# Patient Record
Sex: FEMALE | Race: WHITE | NOT HISPANIC OR LATINO | Employment: OTHER | URBAN - METROPOLITAN AREA
[De-identification: names, ages, dates, MRNs, and addresses within clinical notes are randomized per-mention and may not be internally consistent; named-entity substitution may affect disease eponyms.]

---

## 2023-01-01 ENCOUNTER — TELEPHONE (OUTPATIENT)
Dept: PRIMARY CARE | Facility: CLINIC | Age: 83
End: 2023-01-01
Payer: MEDICARE

## 2023-01-01 ENCOUNTER — OFFICE VISIT (OUTPATIENT)
Dept: PRIMARY CARE | Facility: CLINIC | Age: 83
End: 2023-01-01
Payer: MEDICARE

## 2023-01-01 VITALS
BODY MASS INDEX: 21.68 KG/M2 | HEIGHT: 64 IN | SYSTOLIC BLOOD PRESSURE: 118 MMHG | OXYGEN SATURATION: 95 % | WEIGHT: 127 LBS | DIASTOLIC BLOOD PRESSURE: 68 MMHG | HEART RATE: 95 BPM | RESPIRATION RATE: 16 BRPM

## 2023-01-01 DIAGNOSIS — E11.9 TYPE 2 DIABETES MELLITUS WITHOUT COMPLICATION, WITHOUT LONG-TERM CURRENT USE OF INSULIN (MULTI): ICD-10-CM

## 2023-01-01 DIAGNOSIS — Z13.29 SCREENING FOR HYPOTHYROIDISM: ICD-10-CM

## 2023-01-01 DIAGNOSIS — M81.0 OSTEOPOROSIS, UNSPECIFIED OSTEOPOROSIS TYPE, UNSPECIFIED PATHOLOGICAL FRACTURE PRESENCE: ICD-10-CM

## 2023-01-01 DIAGNOSIS — Z00.00 ROUTINE GENERAL MEDICAL EXAMINATION AT HEALTH CARE FACILITY: Primary | ICD-10-CM

## 2023-01-01 DIAGNOSIS — I10 PRIMARY HYPERTENSION: ICD-10-CM

## 2023-01-01 DIAGNOSIS — M81.0 AGE RELATED OSTEOPOROSIS, UNSPECIFIED PATHOLOGICAL FRACTURE PRESENCE: Primary | ICD-10-CM

## 2023-01-01 DIAGNOSIS — N28.9 RENAL INSUFFICIENCY: ICD-10-CM

## 2023-01-01 DIAGNOSIS — E53.9 VITAMIN B DEFICIENCY: ICD-10-CM

## 2023-01-01 DIAGNOSIS — E11.21 CONTROLLED TYPE 2 DIABETES MELLITUS WITH DIABETIC NEPHROPATHY, WITHOUT LONG-TERM CURRENT USE OF INSULIN (MULTI): Primary | ICD-10-CM

## 2023-01-01 DIAGNOSIS — E78.2 MIXED HYPERLIPIDEMIA: ICD-10-CM

## 2023-01-01 LAB
ALBUMIN (G/DL) IN SER/PLAS: 4.2 G/DL (ref 3.4–5)
ANION GAP IN SER/PLAS: 17 MMOL/L (ref 10–20)
CALCIDIOL (25 OH VITAMIN D3) (NG/ML) IN SER/PLAS: 69 NG/ML
CALCIUM (MG/DL) IN SER/PLAS: 9.2 MG/DL (ref 8.6–10.3)
CARBON DIOXIDE, TOTAL (MMOL/L) IN SER/PLAS: 22 MMOL/L (ref 21–32)
CHLORIDE (MMOL/L) IN SER/PLAS: 107 MMOL/L (ref 98–107)
CREATININE (MG/DL) IN SER/PLAS: 1.69 MG/DL (ref 0.5–1.05)
ERYTHROCYTE DISTRIBUTION WIDTH (RATIO) BY AUTOMATED COUNT: 12.6 % (ref 11.5–14.5)
ERYTHROCYTE MEAN CORPUSCULAR HEMOGLOBIN CONCENTRATION (G/DL) BY AUTOMATED: 30.6 G/DL (ref 32–36)
ERYTHROCYTE MEAN CORPUSCULAR VOLUME (FL) BY AUTOMATED COUNT: 99 FL (ref 80–100)
ERYTHROCYTES (10*6/UL) IN BLOOD BY AUTOMATED COUNT: 3.42 X10E12/L (ref 4–5.2)
GFR FEMALE: 30 ML/MIN/1.73M2
GLUCOSE (MG/DL) IN SER/PLAS: 93 MG/DL (ref 74–99)
HEMATOCRIT (%) IN BLOOD BY AUTOMATED COUNT: 33.7 % (ref 36–46)
HEMOGLOBIN (G/DL) IN BLOOD: 10.3 G/DL (ref 12–16)
LEUKOCYTES (10*3/UL) IN BLOOD BY AUTOMATED COUNT: 7.2 X10E9/L (ref 4.4–11.3)
PARATHYRIN INTACT (PG/ML) IN SER/PLAS: 60.5 PG/ML (ref 18.5–88)
PHOSPHATE (MG/DL) IN SER/PLAS: 4 MG/DL (ref 2.5–4.9)
PLATELETS (10*3/UL) IN BLOOD AUTOMATED COUNT: 277 X10E9/L (ref 150–450)
POTASSIUM (MMOL/L) IN SER/PLAS: 4.6 MMOL/L (ref 3.5–5.3)
SODIUM (MMOL/L) IN SER/PLAS: 141 MMOL/L (ref 136–145)
UREA NITROGEN (MG/DL) IN SER/PLAS: 35 MG/DL (ref 6–23)

## 2023-01-01 PROCEDURE — 3074F SYST BP LT 130 MM HG: CPT

## 2023-01-01 PROCEDURE — 1160F RVW MEDS BY RX/DR IN RCRD: CPT

## 2023-01-01 PROCEDURE — 3078F DIAST BP <80 MM HG: CPT

## 2023-01-01 PROCEDURE — 1170F FXNL STATUS ASSESSED: CPT

## 2023-01-01 PROCEDURE — 1159F MED LIST DOCD IN RCRD: CPT

## 2023-01-01 PROCEDURE — 99214 OFFICE O/P EST MOD 30 MIN: CPT

## 2023-01-01 PROCEDURE — G0439 PPPS, SUBSEQ VISIT: HCPCS

## 2023-01-01 PROCEDURE — 1036F TOBACCO NON-USER: CPT

## 2023-01-01 RX ORDER — METFORMIN HYDROCHLORIDE 850 MG/1
1 TABLET ORAL
COMMUNITY
End: 2023-01-01 | Stop reason: SDUPTHER

## 2023-01-01 RX ORDER — METFORMIN HYDROCHLORIDE 850 MG/1
850 TABLET ORAL
Qty: 180 TABLET | Refills: 3 | Status: SHIPPED | OUTPATIENT
Start: 2023-01-01 | End: 2024-01-01 | Stop reason: HOSPADM

## 2023-01-01 RX ORDER — SIMVASTATIN 40 MG/1
40 TABLET, FILM COATED ORAL NIGHTLY
COMMUNITY
End: 2024-01-01 | Stop reason: HOSPADM

## 2023-01-01 RX ORDER — IBANDRONATE SODIUM 150 MG/1
150 TABLET, FILM COATED ORAL
COMMUNITY
End: 2023-01-01 | Stop reason: SDUPTHER

## 2023-01-01 RX ORDER — LISINOPRIL 20 MG/1
20 TABLET ORAL DAILY
COMMUNITY
End: 2024-01-01 | Stop reason: HOSPADM

## 2023-01-01 RX ORDER — METOPROLOL SUCCINATE 25 MG/1
25 TABLET, EXTENDED RELEASE ORAL DAILY
Qty: 90 TABLET | Refills: 1 | Status: SHIPPED | OUTPATIENT
Start: 2023-01-01 | End: 2024-01-01 | Stop reason: SDUPTHER

## 2023-01-01 RX ORDER — IBANDRONATE SODIUM 150 MG/1
150 TABLET, FILM COATED ORAL
Qty: 90 TABLET | Refills: 1 | Status: SHIPPED | OUTPATIENT
Start: 2023-01-01 | End: 2024-01-01 | Stop reason: HOSPADM

## 2023-01-01 RX ORDER — METOPROLOL SUCCINATE 25 MG/1
25 TABLET, EXTENDED RELEASE ORAL DAILY
COMMUNITY
End: 2023-01-01 | Stop reason: SDUPTHER

## 2023-01-01 RX ORDER — VALSARTAN AND HYDROCHLOROTHIAZIDE 320; 25 MG/1; MG/1
1 TABLET, FILM COATED ORAL DAILY
COMMUNITY
Start: 2020-03-26 | End: 2024-01-01 | Stop reason: HOSPADM

## 2023-01-01 ASSESSMENT — ACTIVITIES OF DAILY LIVING (ADL)
MANAGING_FINANCES: INDEPENDENT
GROCERY_SHOPPING: INDEPENDENT
BATHING: INDEPENDENT
DOING_HOUSEWORK: INDEPENDENT
TAKING_MEDICATION: INDEPENDENT
DRESSING: INDEPENDENT

## 2023-01-01 ASSESSMENT — ENCOUNTER SYMPTOMS
EYES NEGATIVE: 1
MUSCULOSKELETAL NEGATIVE: 1
LOSS OF SENSATION IN FEET: 0
HEMATOLOGIC/LYMPHATIC NEGATIVE: 1
OCCASIONAL FEELINGS OF UNSTEADINESS: 0
CARDIOVASCULAR NEGATIVE: 1
ENDOCRINE NEGATIVE: 1
RESPIRATORY NEGATIVE: 1
ALLERGIC/IMMUNOLOGIC NEGATIVE: 1
PSYCHIATRIC NEGATIVE: 1
CONSTITUTIONAL NEGATIVE: 1
DEPRESSION: 0
GASTROINTESTINAL NEGATIVE: 1
NEUROLOGICAL NEGATIVE: 1

## 2023-01-01 ASSESSMENT — ANXIETY QUESTIONNAIRES
6. BECOMING EASILY ANNOYED OR IRRITABLE: NOT AT ALL
5. BEING SO RESTLESS THAT IT IS HARD TO SIT STILL: NOT AT ALL
GAD7 TOTAL SCORE: 0
1. FEELING NERVOUS, ANXIOUS, OR ON EDGE: NOT AT ALL
2. NOT BEING ABLE TO STOP OR CONTROL WORRYING: NOT AT ALL
7. FEELING AFRAID AS IF SOMETHING AWFUL MIGHT HAPPEN: NOT AT ALL
IF YOU CHECKED OFF ANY PROBLEMS ON THIS QUESTIONNAIRE, HOW DIFFICULT HAVE THESE PROBLEMS MADE IT FOR YOU TO DO YOUR WORK, TAKE CARE OF THINGS AT HOME, OR GET ALONG WITH OTHER PEOPLE: NOT DIFFICULT AT ALL
4. TROUBLE RELAXING: NOT AT ALL
3. WORRYING TOO MUCH ABOUT DIFFERENT THINGS: NOT AT ALL

## 2023-01-01 ASSESSMENT — PATIENT HEALTH QUESTIONNAIRE - PHQ9
1. LITTLE INTEREST OR PLEASURE IN DOING THINGS: NOT AT ALL
SUM OF ALL RESPONSES TO PHQ9 QUESTIONS 1 AND 2: 0
2. FEELING DOWN, DEPRESSED OR HOPELESS: NOT AT ALL

## 2023-08-10 PROBLEM — I10 HYPERTENSION: Status: ACTIVE | Noted: 2023-01-01

## 2023-08-10 PROBLEM — E53.9 VITAMIN B DEFICIENCY: Status: ACTIVE | Noted: 2023-01-01

## 2023-08-10 PROBLEM — J30.9 ALLERGIC RHINITIS: Status: ACTIVE | Noted: 2023-01-01

## 2023-08-10 PROBLEM — U07.1 COVID-19: Status: ACTIVE | Noted: 2023-01-01

## 2023-08-10 PROBLEM — N28.9 RENAL INSUFFICIENCY: Status: ACTIVE | Noted: 2023-01-01

## 2023-08-10 PROBLEM — R79.89 ABNORMAL TSH: Status: ACTIVE | Noted: 2023-01-01

## 2023-08-10 PROBLEM — E78.5 HYPERLIPEMIA: Status: ACTIVE | Noted: 2023-01-01

## 2023-08-10 PROBLEM — E11.9 DIABETES (MULTI): Status: ACTIVE | Noted: 2023-01-01

## 2023-08-10 PROBLEM — D64.9 ANEMIA: Status: ACTIVE | Noted: 2023-01-01

## 2023-08-10 PROBLEM — M81.0 OSTEOPOROSIS: Status: ACTIVE | Noted: 2023-01-01

## 2023-08-10 NOTE — PROGRESS NOTES
"Subjective   Reason for Visit: Kathy Pappas is an 83 y.o. female here for a Medicare Wellness visit.     Past Medical, Surgical, and Family History reviewed and updated in chart.    Reviewed all medications by prescribing practitioner or clinical pharmacist (such as prescriptions, OTCs, herbal therapies and supplements) and documented in the medical record.    HPI an 83-year-old female with past medical history of hypertension, type 2 diabetes mellitus, CKD currently followed by  nephrology, and hyperlipidemia arrives to the clinic with chief complaint of 6-month Medicare wellness.  The patient reports no recent illnesses and has been relatively healthy.  She does not need any medication refills at this time.  Patient denies any chest pain, shortness of breath, diarrhea, fevers, chills, head pain, COVID-like symptoms.    Patient Care Team:  KEYSHA Ross-DAILY as PCP - General  Nhan Brooke MD as PCP - [employer]vantage PCP     Review of Systems   Constitutional: Negative.    HENT: Negative.     Eyes: Negative.    Respiratory: Negative.     Cardiovascular: Negative.    Gastrointestinal: Negative.    Endocrine: Negative.    Genitourinary: Negative.    Musculoskeletal: Negative.    Skin: Negative.    Allergic/Immunologic: Negative.    Neurological: Negative.    Hematological: Negative.    Psychiatric/Behavioral: Negative.     All other systems reviewed and are negative.      Objective   Vitals:  /68   Pulse 95   Resp 16   Ht 1.613 m (5' 3.5\")   Wt 57.6 kg (127 lb)   SpO2 95%   BMI 22.14 kg/m²       Physical Exam  Vitals and nursing note reviewed.   Constitutional:       Appearance: Normal appearance.   HENT:      Head: Normocephalic and atraumatic.      Right Ear: Tympanic membrane normal.      Left Ear: Tympanic membrane normal.      Nose: Nose normal.      Mouth/Throat:      Mouth: Mucous membranes are moist.      Pharynx: Oropharynx is clear.   Eyes:      Extraocular " Movements: Extraocular movements intact.      Conjunctiva/sclera: Conjunctivae normal.      Pupils: Pupils are equal, round, and reactive to light.   Cardiovascular:      Rate and Rhythm: Normal rate and regular rhythm.   Pulmonary:      Effort: Pulmonary effort is normal.      Breath sounds: Normal breath sounds.   Abdominal:      General: Bowel sounds are normal.      Palpations: Abdomen is soft.   Musculoskeletal:         General: Normal range of motion.      Cervical back: Normal range of motion and neck supple.   Skin:     General: Skin is warm.      Capillary Refill: Capillary refill takes less than 2 seconds.   Neurological:      General: No focal deficit present.      Mental Status: She is alert and oriented to person, place, and time. Mental status is at baseline.   Psychiatric:         Mood and Affect: Mood normal.         Behavior: Behavior normal.         Thought Content: Thought content normal.         Judgment: Judgment normal.         Assessment/Plan   Problem List Items Addressed This Visit       Diabetes (CMS/[facility])    Relevant Orders    Comprehensive Metabolic Panel    CBC    Hemoglobin A1C    Follow Up In [facility] - PCP - Established    Hyperlipemia    Relevant Orders    Lipid Panel    Follow Up In [facility] - PCP - Established    Hypertension    Relevant Orders    Comprehensive Metabolic Panel    CBC    Follow Up In [facility] - PCP - Established    Osteoporosis    Relevant Orders    Vitamin D, Total    Follow Up In [facility] - PCP - Established    Vitamin B deficiency    Relevant Orders    Vitamin B12    Follow Up In [facility] - PCP - Established     Other Visit Diagnoses       Routine general medical examination at health care facility    -  Primary    Relevant Orders    Follow Up In [facility] - PCP - Established    Screening for hypothyroidism        Relevant Orders    TSH with reflex to Free T4 if abnormal    Follow Up In  [facility] - PCP - Established          It was a pleasure seeing you in the office today.    Continue the treatment plan and medication regimen set forth by  [facility].    Continue lisinopril 20 mg oral tablet daily, metoprolol 25 mg oral tablet daily, Diovan-hydrochlorothiazide 320-25 mg oral tablet daily for your blood pressure.  Your blood pressure is controlled.    Continue simvastatin 40 mg oral tablet nightly for your cholesterol.    Continue metformin 850 mg oral tablet twice a day for your diabetes.    Continue Boniva 100 mg per nephrology.    Next appointment will be in 6 months with Dr. Jazz Amin.  I have ordered lab work for you to complete prior to then.  These labs require you to fast.    I also advised you to follow low fat diet and exercise for at least 30 minutes daily.    Anticipatory guidance, age appropriate vaccines, screening exams, health promotion and prevention discussed.    Continue to monitor sugars closely, follow diabetic diet and encouraged daily activity or exercise as able.    This document was generated using the assistance of voice recognition software. If there are any errors of spelling, grammar, syntax, or meaning; please feel free to contact me directly for clarification.

## 2023-08-10 NOTE — PATIENT INSTRUCTIONS
6 month follow up with fasting labs. Will call with results.     Follow up in 6 months with Dr. Jazz Amin.

## 2023-11-03 NOTE — TELEPHONE ENCOUNTER
Rx Refill Request Telephone Encounter    Name:  Kathy JAUREGUI Elyse  :  [id number]  Medication Name:  Ibandronate  150 mg          Specific Pharmacy location:  [facility]  Rx Refill Request Telephone Encounter    Name:  Kathy JAUREGUI Elyse  :  [id number]  Medication Name:  Metoprolol succinate XL  25 mg

## 2024-01-01 ENCOUNTER — APPOINTMENT (OUTPATIENT)
Dept: CARDIOLOGY | Facility: HOSPITAL | Age: 84
DRG: 480 | End: 2024-01-01
Payer: MEDICARE

## 2024-01-01 ENCOUNTER — ANESTHESIA (OUTPATIENT)
Dept: OPERATING ROOM | Facility: HOSPITAL | Age: 84
DRG: 480 | End: 2024-01-01
Payer: MEDICARE

## 2024-01-01 ENCOUNTER — APPOINTMENT (OUTPATIENT)
Dept: RADIOLOGY | Facility: HOSPITAL | Age: 84
DRG: 480 | End: 2024-01-01
Payer: MEDICARE

## 2024-01-01 ENCOUNTER — PREP FOR PROCEDURE (OUTPATIENT)
Dept: ORTHOPEDIC SURGERY | Facility: CLINIC | Age: 84
End: 2024-01-01

## 2024-01-01 ENCOUNTER — LAB (OUTPATIENT)
Dept: LAB | Facility: LAB | Age: 84
End: 2024-01-01
Payer: MEDICARE

## 2024-01-01 ENCOUNTER — ANESTHESIA EVENT (OUTPATIENT)
Dept: OPERATING ROOM | Facility: HOSPITAL | Age: 84
DRG: 480 | End: 2024-01-01
Payer: MEDICARE

## 2024-01-01 ENCOUNTER — HOSPITAL ENCOUNTER (INPATIENT)
Dept: NEUROLOGY | Facility: HOSPITAL | Age: 84
Discharge: HOME | DRG: 480 | End: 2024-02-19
Payer: MEDICARE

## 2024-01-01 ENCOUNTER — HOSPITAL ENCOUNTER (INPATIENT)
Facility: HOSPITAL | Age: 84
LOS: 6 days | DRG: 480 | End: 2024-02-24
Attending: STUDENT IN AN ORGANIZED HEALTH CARE EDUCATION/TRAINING PROGRAM | Admitting: INTERNAL MEDICINE
Payer: MEDICARE

## 2024-01-01 ENCOUNTER — TELEPHONE (OUTPATIENT)
Dept: PRIMARY CARE | Facility: CLINIC | Age: 84
End: 2024-01-01
Payer: MEDICARE

## 2024-01-01 ENCOUNTER — APPOINTMENT (OUTPATIENT)
Dept: PRIMARY CARE | Facility: CLINIC | Age: 84
End: 2024-01-01
Payer: MEDICARE

## 2024-01-01 VITALS
HEIGHT: 63 IN | SYSTOLIC BLOOD PRESSURE: 112 MMHG | WEIGHT: 134.92 LBS | OXYGEN SATURATION: 100 % | HEART RATE: 21 BPM | TEMPERATURE: 96.1 F | DIASTOLIC BLOOD PRESSURE: 63 MMHG | BODY MASS INDEX: 23.91 KG/M2

## 2024-01-01 DIAGNOSIS — S72.001A CLOSED FRACTURE OF RIGHT HIP, INITIAL ENCOUNTER (MULTI): ICD-10-CM

## 2024-01-01 DIAGNOSIS — S72.001A: ICD-10-CM

## 2024-01-01 DIAGNOSIS — E78.2 MIXED HYPERLIPIDEMIA: ICD-10-CM

## 2024-01-01 DIAGNOSIS — N18.32 CHRONIC KIDNEY DISEASE, STAGE 3B (MULTI): Primary | ICD-10-CM

## 2024-01-01 DIAGNOSIS — R68.89 OTHER GENERAL SYMPTOMS AND SIGNS: ICD-10-CM

## 2024-01-01 DIAGNOSIS — I10 PRIMARY HYPERTENSION: ICD-10-CM

## 2024-01-01 DIAGNOSIS — E11.9 TYPE 2 DIABETES MELLITUS WITHOUT COMPLICATION, WITHOUT LONG-TERM CURRENT USE OF INSULIN (MULTI): ICD-10-CM
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
## 2024-01-01 DIAGNOSIS — Z13.29 SCREENING FOR HYPOTHYROIDISM: ICD-10-CM

## 2024-01-01 DIAGNOSIS — E53.9 VITAMIN B DEFICIENCY: ICD-10-CM

## 2024-01-01 DIAGNOSIS — I63.9 CEREBROVASCULAR ACCIDENT (CVA), UNSPECIFIED MECHANISM (MULTI): ICD-10-CM

## 2024-01-01 DIAGNOSIS — I46.9 CARDIOPULMONARY ARREST (MULTI): ICD-10-CM

## 2024-01-01 DIAGNOSIS — E83.42 HYPOMAGNESEMIA: ICD-10-CM

## 2024-01-01 DIAGNOSIS — M81.0 OSTEOPOROSIS, UNSPECIFIED OSTEOPOROSIS TYPE, UNSPECIFIED PATHOLOGICAL FRACTURE PRESENCE: ICD-10-CM

## 2024-01-01 LAB
25(OH)D3 SERPL-MCNC: 65 NG/ML (ref 30–100)
ABO GROUP (TYPE) IN BLOOD: NORMAL
ABO GROUP (TYPE) IN BLOOD: NORMAL
ALBUMIN SERPL BCP-MCNC: 2.8 G/DL (ref 3.4–5)
ALBUMIN SERPL BCP-MCNC: 2.8 G/DL (ref 3.4–5)
ALBUMIN SERPL BCP-MCNC: 2.9 G/DL (ref 3.4–5)
ALBUMIN SERPL BCP-MCNC: 3 G/DL (ref 3.4–5)
ALBUMIN SERPL BCP-MCNC: 3.1 G/DL (ref 3.4–5)
ALBUMIN SERPL BCP-MCNC: 3.2 G/DL (ref 3.4–5)
ALBUMIN SERPL BCP-MCNC: 3.9 G/DL (ref 3.4–5)
ALBUMIN SERPL BCP-MCNC: 4 G/DL (ref 3.4–5)
ALP SERPL-CCNC: 50 U/L (ref 33–136)
ALP SERPL-CCNC: 52 U/L (ref 33–136)
ALP SERPL-CCNC: 55 U/L (ref 33–136)
ALP SERPL-CCNC: 64 U/L (ref 33–136)
ALP SERPL-CCNC: 65 U/L (ref 33–136)
ALP SERPL-CCNC: 75 U/L (ref 33–136)
ALP SERPL-CCNC: 80 U/L (ref 33–136)
ALP SERPL-CCNC: 95 U/L (ref 33–136)
ALT SERPL W P-5'-P-CCNC: 10 U/L (ref 7–45)
ALT SERPL W P-5'-P-CCNC: 123 U/L (ref 7–45)
ALT SERPL W P-5'-P-CCNC: 15 U/L (ref 7–45)
ALT SERPL W P-5'-P-CCNC: 3 U/L (ref 7–45)
ALT SERPL W P-5'-P-CCNC: 3 U/L (ref 7–45)
ALT SERPL W P-5'-P-CCNC: 40 U/L (ref 7–45)
ALT SERPL W P-5'-P-CCNC: 5 U/L (ref 7–45)
ALT SERPL W P-5'-P-CCNC: 7 U/L (ref 7–45)
ANION GAP BLDA CALCULATED.4IONS-SCNC: 11 MMO/L (ref 10–25)
ANION GAP BLDV CALCULATED.4IONS-SCNC: 6 MMOL/L (ref 10–25)
ANION GAP SERPL CALC-SCNC: 10 MMOL/L (ref 10–20)
ANION GAP SERPL CALC-SCNC: 11 MMOL/L (ref 10–20)
ANION GAP SERPL CALC-SCNC: 12 MMOL/L (ref 10–20)
ANION GAP SERPL CALC-SCNC: 13 MMOL/L (ref 10–20)
ANION GAP SERPL CALC-SCNC: 14 MMOL/L (ref 10–20)
ANION GAP SERPL CALC-SCNC: 15 MMOL/L (ref 10–20)
ANION GAP SERPL CALC-SCNC: 15 MMOL/L (ref 10–20)
ANION GAP SERPL CALC-SCNC: 18 MMOL/L (ref 10–20)
ANION GAP SERPL CALC-SCNC: 21 MMOL/L (ref 10–20)
ANTIBODY SCREEN: NORMAL
ANTIBODY SCREEN: NORMAL
AORTIC VALVE MEAN GRADIENT: 6 MMHG
AORTIC VALVE PEAK VELOCITY: 1.54 M/S
APPEARANCE UR: CLEAR
AST SERPL W P-5'-P-CCNC: 101 U/L (ref 9–39)
AST SERPL W P-5'-P-CCNC: 12 U/L (ref 9–39)
AST SERPL W P-5'-P-CCNC: 12 U/L (ref 9–39)
AST SERPL W P-5'-P-CCNC: 141 U/L (ref 9–39)
AST SERPL W P-5'-P-CCNC: 215 U/L (ref 9–39)
AST SERPL W P-5'-P-CCNC: 32 U/L (ref 9–39)
AST SERPL W P-5'-P-CCNC: 37 U/L (ref 9–39)
AST SERPL W P-5'-P-CCNC: 49 U/L (ref 9–39)
ATRIAL RATE: 74 BPM
ATRIAL RATE: 74 BPM
AV PEAK GRADIENT: 9.5 MMHG
AVA (PEAK VEL): 1.78 CM2
AVA (VTI): 1.99 CM2
BASE EXCESS BLDA CALC-SCNC: -4.8 MMOL/L (ref -2–3)
BASE EXCESS BLDV CALC-SCNC: -2.2 MMOL/L (ref -2–3)
BASE EXCESS BLDV CALC-SCNC: 3 MMOL/L (ref -2–3)
BASOPHILS # BLD AUTO: 0.04 X10*3/UL (ref 0–0.1)
BASOPHILS NFR BLD AUTO: 0.4 %
BILIRUB DIRECT SERPL-MCNC: 0.1 MG/DL (ref 0–0.3)
BILIRUB DIRECT SERPL-MCNC: 0.2 MG/DL (ref 0–0.3)
BILIRUB SERPL-MCNC: 0.5 MG/DL (ref 0–1.2)
BILIRUB SERPL-MCNC: 0.5 MG/DL (ref 0–1.2)
BILIRUB SERPL-MCNC: 0.6 MG/DL (ref 0–1.2)
BILIRUB SERPL-MCNC: 0.7 MG/DL (ref 0–1.2)
BILIRUB UR STRIP.AUTO-MCNC: NEGATIVE MG/DL
BLOOD EXPIRATION DATE: NORMAL
BLOOD EXPIRATION DATE: NORMAL
BODY TEMPERATURE: 37 DEGREES CELSIUS
BUN SERPL-MCNC: 27 MG/DL (ref 6–23)
BUN SERPL-MCNC: 29 MG/DL (ref 6–23)
BUN SERPL-MCNC: 31 MG/DL (ref 6–23)
BUN SERPL-MCNC: 32 MG/DL (ref 6–23)
BUN SERPL-MCNC: 33 MG/DL (ref 6–23)
BUN SERPL-MCNC: 34 MG/DL (ref 6–23)
BUN SERPL-MCNC: 34 MG/DL (ref 6–23)
BUN SERPL-MCNC: 41 MG/DL (ref 6–23)
BUN SERPL-MCNC: 45 MG/DL (ref 6–23)
CA-I BLDA-SCNC: 0.99 MMOL/L (ref 1.1–1.33)
CA-I BLDV-SCNC: 1.04 MMOL/L (ref 1.1–1.33)
CALCIUM SERPL-MCNC: 6.9 MG/DL (ref 8.6–10.3)
CALCIUM SERPL-MCNC: 7 MG/DL (ref 8.6–10.3)
CALCIUM SERPL-MCNC: 7.2 MG/DL (ref 8.6–10.3)
CALCIUM SERPL-MCNC: 7.6 MG/DL (ref 8.6–10.3)
CALCIUM SERPL-MCNC: 8.2 MG/DL (ref 8.6–10.3)
CALCIUM SERPL-MCNC: 8.4 MG/DL (ref 8.6–10.3)
CALCIUM SERPL-MCNC: 8.6 MG/DL (ref 8.6–10.3)
CALCIUM SERPL-MCNC: 8.9 MG/DL (ref 8.6–10.3)
CALCIUM SERPL-MCNC: 9 MG/DL (ref 8.6–10.3)
CARDIAC TROPONIN I PNL SERPL HS: 1077 NG/L (ref 0–13)
CARDIAC TROPONIN I PNL SERPL HS: 26 NG/L (ref 0–13)
CHLORIDE BLDA-SCNC: 106 MMOL/L (ref 98–107)
CHLORIDE BLDV-SCNC: 106 MMOL/L (ref 98–107)
CHLORIDE SERPL-SCNC: 100 MMOL/L (ref 98–107)
CHLORIDE SERPL-SCNC: 102 MMOL/L (ref 98–107)
CHLORIDE SERPL-SCNC: 102 MMOL/L (ref 98–107)
CHLORIDE SERPL-SCNC: 104 MMOL/L (ref 98–107)
CHLORIDE SERPL-SCNC: 105 MMOL/L (ref 98–107)
CHLORIDE SERPL-SCNC: 106 MMOL/L (ref 98–107)
CHLORIDE SERPL-SCNC: 107 MMOL/L (ref 98–107)
CHLORIDE SERPL-SCNC: 107 MMOL/L (ref 98–107)
CHLORIDE SERPL-SCNC: 108 MMOL/L (ref 98–107)
CHOLEST SERPL-MCNC: 111 MG/DL (ref 0–199)
CHOLESTEROL/HDL RATIO: 2.9
CO2 SERPL-SCNC: 17 MMOL/L (ref 21–32)
CO2 SERPL-SCNC: 22 MMOL/L (ref 21–32)
CO2 SERPL-SCNC: 23 MMOL/L (ref 21–32)
CO2 SERPL-SCNC: 24 MMOL/L (ref 21–32)
CO2 SERPL-SCNC: 25 MMOL/L (ref 21–32)
CO2 SERPL-SCNC: 26 MMOL/L (ref 21–32)
CO2 SERPL-SCNC: 27 MMOL/L (ref 21–32)
COLOR UR: YELLOW
CREAT SERPL-MCNC: 1.63 MG/DL (ref 0.5–1.05)
CREAT SERPL-MCNC: 1.76 MG/DL (ref 0.5–1.05)
CREAT SERPL-MCNC: 1.76 MG/DL (ref 0.5–1.05)
CREAT SERPL-MCNC: 1.85 MG/DL (ref 0.5–1.05)
CREAT SERPL-MCNC: 1.97 MG/DL (ref 0.5–1.05)
CREAT SERPL-MCNC: 2.05 MG/DL (ref 0.5–1.05)
CREAT SERPL-MCNC: 2.09 MG/DL (ref 0.5–1.05)
CREAT SERPL-MCNC: 2.16 MG/DL (ref 0.5–1.05)
CREAT SERPL-MCNC: 2.21 MG/DL (ref 0.5–1.05)
CREAT UR-MCNC: 48.8 MG/DL (ref 20–320)
CREAT UR-MCNC: 49.2 MG/DL (ref 20–320)
DISPENSE STATUS: NORMAL
DISPENSE STATUS: NORMAL
EGFRCR SERPLBLD CKD-EPI 2021: 22 ML/MIN/1.73M*2
EGFRCR SERPLBLD CKD-EPI 2021: 22 ML/MIN/1.73M*2
EGFRCR SERPLBLD CKD-EPI 2021: 23 ML/MIN/1.73M*2
EGFRCR SERPLBLD CKD-EPI 2021: 24 ML/MIN/1.73M*2
EGFRCR SERPLBLD CKD-EPI 2021: 25 ML/MIN/1.73M*2
EGFRCR SERPLBLD CKD-EPI 2021: 27 ML/MIN/1.73M*2
EGFRCR SERPLBLD CKD-EPI 2021: 29 ML/MIN/1.73M*2
EGFRCR SERPLBLD CKD-EPI 2021: 29 ML/MIN/1.73M*2
EGFRCR SERPLBLD CKD-EPI 2021: 31 ML/MIN/1.73M*2
EJECTION FRACTION APICAL 4 CHAMBER: 81.2
EJECTION FRACTION: 81 %
EOSINOPHIL # BLD AUTO: 0.21 X10*3/UL (ref 0–0.4)
EOSINOPHIL NFR BLD AUTO: 1.9 %
ERYTHROCYTE [DISTWIDTH] IN BLOOD BY AUTOMATED COUNT: 12.5 % (ref 11.5–14.5)
ERYTHROCYTE [DISTWIDTH] IN BLOOD BY AUTOMATED COUNT: 12.6 % (ref 11.5–14.5)
ERYTHROCYTE [DISTWIDTH] IN BLOOD BY AUTOMATED COUNT: 12.6 % (ref 11.5–14.5)
ERYTHROCYTE [DISTWIDTH] IN BLOOD BY AUTOMATED COUNT: 12.8 % (ref 11.5–14.5)
ERYTHROCYTE [DISTWIDTH] IN BLOOD BY AUTOMATED COUNT: 13.6 % (ref 11.5–14.5)
ERYTHROCYTE [DISTWIDTH] IN BLOOD BY AUTOMATED COUNT: 13.8 % (ref 11.5–14.5)
ERYTHROCYTE [DISTWIDTH] IN BLOOD BY AUTOMATED COUNT: 14 % (ref 11.5–14.5)
ERYTHROCYTE [DISTWIDTH] IN BLOOD BY AUTOMATED COUNT: 14.4 % (ref 11.5–14.5)
ERYTHROCYTE [DISTWIDTH] IN BLOOD BY AUTOMATED COUNT: 14.6 % (ref 11.5–14.5)
EST. AVERAGE GLUCOSE BLD GHB EST-MCNC: 131 MG/DL
FOLATE SERPL-MCNC: 8 NG/ML
GLUCOSE BLD MANUAL STRIP-MCNC: 103 MG/DL (ref 74–99)
GLUCOSE BLD MANUAL STRIP-MCNC: 107 MG/DL (ref 74–99)
GLUCOSE BLD MANUAL STRIP-MCNC: 108 MG/DL (ref 74–99)
GLUCOSE BLD MANUAL STRIP-MCNC: 119 MG/DL (ref 74–99)
GLUCOSE BLD MANUAL STRIP-MCNC: 121 MG/DL (ref 74–99)
GLUCOSE BLD MANUAL STRIP-MCNC: 129 MG/DL (ref 74–99)
GLUCOSE BLD MANUAL STRIP-MCNC: 132 MG/DL (ref 74–99)
GLUCOSE BLD MANUAL STRIP-MCNC: 132 MG/DL (ref 74–99)
GLUCOSE BLD MANUAL STRIP-MCNC: 143 MG/DL (ref 74–99)
GLUCOSE BLD MANUAL STRIP-MCNC: 151 MG/DL (ref 74–99)
GLUCOSE BLD MANUAL STRIP-MCNC: 153 MG/DL (ref 74–99)
GLUCOSE BLD MANUAL STRIP-MCNC: 155 MG/DL (ref 74–99)
GLUCOSE BLD MANUAL STRIP-MCNC: 158 MG/DL (ref 74–99)
GLUCOSE BLD MANUAL STRIP-MCNC: 159 MG/DL (ref 74–99)
GLUCOSE BLD MANUAL STRIP-MCNC: 161 MG/DL (ref 74–99)
GLUCOSE BLD MANUAL STRIP-MCNC: 163 MG/DL (ref 74–99)
GLUCOSE BLD MANUAL STRIP-MCNC: 169 MG/DL (ref 74–99)
GLUCOSE BLD MANUAL STRIP-MCNC: 183 MG/DL (ref 74–99)
GLUCOSE BLD MANUAL STRIP-MCNC: 194 MG/DL (ref 74–99)
GLUCOSE BLD MANUAL STRIP-MCNC: 210 MG/DL (ref 74–99)
GLUCOSE BLD MANUAL STRIP-MCNC: 234 MG/DL (ref 74–99)
GLUCOSE BLD MANUAL STRIP-MCNC: 257 MG/DL (ref 74–99)
GLUCOSE BLD MANUAL STRIP-MCNC: 96 MG/DL (ref 74–99)
GLUCOSE BLDA-MCNC: 243 MG/DL (ref 74–99)
GLUCOSE BLDV-MCNC: 177 MG/DL (ref 74–99)
GLUCOSE SERPL-MCNC: 107 MG/DL (ref 74–99)
GLUCOSE SERPL-MCNC: 116 MG/DL (ref 74–99)
GLUCOSE SERPL-MCNC: 126 MG/DL (ref 74–99)
GLUCOSE SERPL-MCNC: 181 MG/DL (ref 74–99)
GLUCOSE SERPL-MCNC: 197 MG/DL (ref 74–99)
GLUCOSE SERPL-MCNC: 221 MG/DL (ref 74–99)
GLUCOSE SERPL-MCNC: 253 MG/DL (ref 74–99)
GLUCOSE SERPL-MCNC: 93 MG/DL (ref 74–99)
GLUCOSE SERPL-MCNC: 98 MG/DL (ref 74–99)
GLUCOSE UR STRIP.AUTO-MCNC: NEGATIVE MG/DL
HBA1C MFR BLD: 6.2 %
HCO3 BLDA-SCNC: 21.6 MMOL/L (ref 22–26)
HCO3 BLDV-SCNC: 27 MMOL/L (ref 22–26)
HCO3 BLDV-SCNC: 29.5 MMOL/L (ref 22–26)
HCT VFR BLD AUTO: 22.6 % (ref 36–46)
HCT VFR BLD AUTO: 22.8 % (ref 36–46)
HCT VFR BLD AUTO: 23.2 % (ref 36–46)
HCT VFR BLD AUTO: 23.5 % (ref 36–46)
HCT VFR BLD AUTO: 23.7 % (ref 36–46)
HCT VFR BLD AUTO: 28.7 % (ref 36–46)
HCT VFR BLD AUTO: 29.4 % (ref 36–46)
HCT VFR BLD AUTO: 29.6 % (ref 36–46)
HCT VFR BLD AUTO: 34.4 % (ref 36–46)
HCT VFR BLD EST: 20 % (ref 36–46)
HCT VFR BLD EST: 24 % (ref 36–46)
HDLC SERPL-MCNC: 38.2 MG/DL
HGB BLD-MCNC: 10.6 G/DL (ref 12–16)
HGB BLD-MCNC: 7.1 G/DL (ref 12–16)
HGB BLD-MCNC: 7.2 G/DL (ref 12–16)
HGB BLD-MCNC: 7.3 G/DL (ref 12–16)
HGB BLD-MCNC: 7.4 G/DL (ref 12–16)
HGB BLD-MCNC: 7.7 G/DL (ref 12–16)
HGB BLD-MCNC: 8.8 G/DL (ref 12–16)
HGB BLD-MCNC: 9.1 G/DL (ref 12–16)
HGB BLD-MCNC: 9.6 G/DL (ref 12–16)
HGB BLDA-MCNC: 6.6 G/DL (ref 12–16)
HGB BLDV-MCNC: 7.9 G/DL (ref 12–16)
HYALINE CASTS #/AREA URNS AUTO: ABNORMAL /LPF
IMM GRANULOCYTES # BLD AUTO: 0.05 X10*3/UL (ref 0–0.5)
IMM GRANULOCYTES NFR BLD AUTO: 0.5 % (ref 0–0.9)
INHALED O2 CONCENTRATION: 100 %
INHALED O2 CONCENTRATION: 45 %
INHALED O2 CONCENTRATION: 70 %
INR PPP: 1.1 (ref 0.9–1.1)
IRON SATN MFR SERPL: 16 % (ref 25–45)
IRON SERPL-MCNC: 26 UG/DL (ref 35–150)
KETONES UR STRIP.AUTO-MCNC: NEGATIVE MG/DL
LACTATE BLDA-SCNC: 5.5 MMOL/L (ref 0.4–2)
LACTATE BLDV-SCNC: 1.8 MMOL/L (ref 0.4–2)
LACTATE BLDV-SCNC: 1.8 MMOL/L (ref 0.4–2)
LACTATE SERPL-SCNC: 3.3 MMOL/L (ref 0.4–2)
LDLC SERPL CALC-MCNC: 41 MG/DL
LEFT ATRIUM VOLUME AREA LENGTH INDEX BSA: 15.1 ML/M2
LEFT VENTRICLE INTERNAL DIMENSION DIASTOLE: 3.54 CM (ref 3.5–6)
LEFT VENTRICULAR OUTFLOW TRACT DIAMETER: 2 CM
LEUKOCYTE ESTERASE UR QL STRIP.AUTO: NEGATIVE
LIPASE SERPL-CCNC: 34 U/L (ref 9–82)
LYMPHOCYTES # BLD AUTO: 1.28 X10*3/UL (ref 0.8–3)
LYMPHOCYTES NFR BLD AUTO: 11.8 %
MAGNESIUM SERPL-MCNC: 0.64 MG/DL (ref 1.6–2.4)
MAGNESIUM SERPL-MCNC: 1.55 MG/DL (ref 1.6–2.4)
MAGNESIUM SERPL-MCNC: 1.7 MG/DL (ref 1.6–2.4)
MAGNESIUM SERPL-MCNC: 1.84 MG/DL (ref 1.6–2.4)
MAGNESIUM SERPL-MCNC: 2 MG/DL (ref 1.6–2.4)
MAGNESIUM SERPL-MCNC: 2.01 MG/DL (ref 1.6–2.4)
MAGNESIUM SERPL-MCNC: 2.02 MG/DL (ref 1.6–2.4)
MCH RBC QN AUTO: 29.8 PG (ref 26–34)
MCH RBC QN AUTO: 30 PG (ref 26–34)
MCH RBC QN AUTO: 30.4 PG (ref 26–34)
MCH RBC QN AUTO: 30.7 PG (ref 26–34)
MCH RBC QN AUTO: 30.7 PG (ref 26–34)
MCH RBC QN AUTO: 31 PG (ref 26–34)
MCH RBC QN AUTO: 31.2 PG (ref 26–34)
MCHC RBC AUTO-ENTMCNC: 30.7 G/DL (ref 32–36)
MCHC RBC AUTO-ENTMCNC: 30.8 G/DL (ref 32–36)
MCHC RBC AUTO-ENTMCNC: 31 G/DL (ref 32–36)
MCHC RBC AUTO-ENTMCNC: 31.4 G/DL (ref 32–36)
MCHC RBC AUTO-ENTMCNC: 31.5 G/DL (ref 32–36)
MCHC RBC AUTO-ENTMCNC: 31.5 G/DL (ref 32–36)
MCHC RBC AUTO-ENTMCNC: 31.6 G/DL (ref 32–36)
MCHC RBC AUTO-ENTMCNC: 32.4 G/DL (ref 32–36)
MCHC RBC AUTO-ENTMCNC: 32.5 G/DL (ref 32–36)
MCV RBC AUTO: 100 FL (ref 80–100)
MCV RBC AUTO: 94 FL (ref 80–100)
MCV RBC AUTO: 95 FL (ref 80–100)
MCV RBC AUTO: 95 FL (ref 80–100)
MCV RBC AUTO: 96 FL (ref 80–100)
MCV RBC AUTO: 99 FL (ref 80–100)
MCV RBC AUTO: 99 FL (ref 80–100)
MICROALBUMIN UR-MCNC: 42 MG/L
MICROALBUMIN/CREAT UR: 85.4 UG/MG CREAT
MITRAL VALVE E/A RATIO: 0.82
MITRAL VALVE E/E' RATIO: 13.95
MONOCYTES # BLD AUTO: 0.49 X10*3/UL (ref 0.05–0.8)
MONOCYTES NFR BLD AUTO: 4.5 %
MUCOUS THREADS #/AREA URNS AUTO: ABNORMAL /LPF
NEUTROPHILS # BLD AUTO: 8.8 X10*3/UL (ref 1.6–5.5)
NEUTROPHILS NFR BLD AUTO: 80.9 %
NITRITE UR QL STRIP.AUTO: NEGATIVE
NON HDL CHOLESTEROL: 73 MG/DL (ref 0–149)
NRBC BLD-RTO: 0 /100 WBCS (ref 0–0)
NRBC BLD-RTO: 0.1 /100 WBCS (ref 0–0)
OXYHGB MFR BLDA: 33.6 % (ref 94–98)
OXYHGB MFR BLDV: 32.3 % (ref 45–75)
OXYHGB MFR BLDV: 49.4 % (ref 45–75)
P AXIS: 46 DEGREES
P AXIS: 70 DEGREES
PCO2 BLDA: 46 MM HG (ref 38–42)
PCO2 BLDV: 56 MM HG (ref 41–51)
PCO2 BLDV: 66 MM HG (ref 41–51)
PH BLDA: 7.28 PH (ref 7.38–7.42)
PH BLDV: 7.22 PH (ref 7.33–7.43)
PH BLDV: 7.33 PH (ref 7.33–7.43)
PH UR STRIP.AUTO: 6 [PH]
PHOSPHATE SERPL-MCNC: 4.5 MG/DL (ref 2.5–4.9)
PLATELET # BLD AUTO: 163 X10*3/UL (ref 150–450)
PLATELET # BLD AUTO: 178 X10*3/UL (ref 150–450)
PLATELET # BLD AUTO: 194 X10*3/UL (ref 150–450)
PLATELET # BLD AUTO: 210 X10*3/UL (ref 150–450)
PLATELET # BLD AUTO: 212 X10*3/UL (ref 150–450)
PLATELET # BLD AUTO: 234 X10*3/UL (ref 150–450)
PLATELET # BLD AUTO: 236 X10*3/UL (ref 150–450)
PLATELET # BLD AUTO: 273 X10*3/UL (ref 150–450)
PLATELET # BLD AUTO: 310 X10*3/UL (ref 150–450)
PO2 BLDA: 29 MM HG (ref 85–95)
PO2 BLDV: 33 MM HG (ref 35–45)
PO2 BLDV: 35 MM HG (ref 35–45)
POTASSIUM BLDA-SCNC: 4 MMOL/L (ref 3.5–5.3)
POTASSIUM BLDV-SCNC: 4 MMOL/L (ref 3.5–5.3)
POTASSIUM SERPL-SCNC: 3.3 MMOL/L (ref 3.5–5.3)
POTASSIUM SERPL-SCNC: 3.5 MMOL/L (ref 3.5–5.3)
POTASSIUM SERPL-SCNC: 3.7 MMOL/L (ref 3.5–5.3)
POTASSIUM SERPL-SCNC: 3.7 MMOL/L (ref 3.5–5.3)
POTASSIUM SERPL-SCNC: 4 MMOL/L (ref 3.5–5.3)
POTASSIUM SERPL-SCNC: 4.1 MMOL/L (ref 3.5–5.3)
POTASSIUM SERPL-SCNC: 4.6 MMOL/L (ref 3.5–5.3)
POTASSIUM SERPL-SCNC: 4.8 MMOL/L (ref 3.5–5.3)
POTASSIUM SERPL-SCNC: 5.2 MMOL/L (ref 3.5–5.3)
PR INTERVAL: 170 MS
PR INTERVAL: 191 MS
PRODUCT BLOOD TYPE: 5100
PRODUCT BLOOD TYPE: 5100
PRODUCT CODE: NORMAL
PRODUCT CODE: NORMAL
PROT SERPL-MCNC: 5.2 G/DL (ref 6.4–8.2)
PROT SERPL-MCNC: 5.4 G/DL (ref 6.4–8.2)
PROT SERPL-MCNC: 5.6 G/DL (ref 6.4–8.2)
PROT SERPL-MCNC: 6.3 G/DL (ref 6.4–8.2)
PROT SERPL-MCNC: 6.6 G/DL (ref 6.4–8.2)
PROT SERPL-MCNC: 6.9 G/DL (ref 6.4–8.2)
PROT UR STRIP.AUTO-MCNC: ABNORMAL MG/DL
PROTHROMBIN TIME: 12.6 SECONDS (ref 9.8–12.8)
PTH-INTACT SERPL-MCNC: 184.5 PG/ML (ref 18.5–88)
PTH-INTACT SERPL-MCNC: 313.1 PG/ML (ref 18.5–88)
Q ONSET: 251 MS
Q ONSET: 253 MS
QRS COUNT: 12 BEATS
QRS COUNT: 12 BEATS
QRS DURATION: 90 MS
QRS DURATION: 93 MS
QT INTERVAL: 406 MS
QT INTERVAL: 415 MS
QTC CALCULATION(BAZETT): 451 MS
QTC CALCULATION(BAZETT): 461 MS
QTC FREDERICIA: 435 MS
QTC FREDERICIA: 445 MS
R AXIS: 12 DEGREES
R AXIS: 57 DEGREES
RBC # BLD AUTO: 2.29 X10*6/UL (ref 4–5.2)
RBC # BLD AUTO: 2.4 X10*6/UL (ref 4–5.2)
RBC # BLD AUTO: 2.43 X10*6/UL (ref 4–5.2)
RBC # BLD AUTO: 2.47 X10*6/UL (ref 4–5.2)
RBC # BLD AUTO: 2.51 X10*6/UL (ref 4–5.2)
RBC # BLD AUTO: 2.89 X10*6/UL (ref 4–5.2)
RBC # BLD AUTO: 3.05 X10*6/UL (ref 4–5.2)
RBC # BLD AUTO: 3.08 X10*6/UL (ref 4–5.2)
RBC # BLD AUTO: 3.45 X10*6/UL (ref 4–5.2)
RBC # UR STRIP.AUTO: ABNORMAL /UL
RBC #/AREA URNS AUTO: ABNORMAL /HPF
RH FACTOR (ANTIGEN D): NORMAL
RH FACTOR (ANTIGEN D): NORMAL
RIGHT VENTRICLE FREE WALL PEAK S': 19.9 CM/S
RIGHT VENTRICLE PEAK SYSTOLIC PRESSURE: 59.6 MMHG
SAO2 % BLDA: 34 % (ref 94–100)
SAO2 % BLDV: 33 % (ref 45–75)
SAO2 % BLDV: 50 % (ref 45–75)
SODIUM BLDA-SCNC: 135 MMOL/L (ref 136–145)
SODIUM BLDV-SCNC: 137 MMOL/L (ref 136–145)
SODIUM SERPL-SCNC: 137 MMOL/L (ref 136–145)
SODIUM SERPL-SCNC: 137 MMOL/L (ref 136–145)
SODIUM SERPL-SCNC: 138 MMOL/L (ref 136–145)
SODIUM SERPL-SCNC: 139 MMOL/L (ref 136–145)
SODIUM SERPL-SCNC: 139 MMOL/L (ref 136–145)
SODIUM SERPL-SCNC: 140 MMOL/L (ref 136–145)
SODIUM SERPL-SCNC: 142 MMOL/L (ref 136–145)
SODIUM UR-SCNC: 81 MMOL/L
SODIUM/CREAT UR-RTO: 166 MMOL/G CREAT
SP GR UR STRIP.AUTO: 1.01
SQUAMOUS #/AREA URNS AUTO: ABNORMAL /HPF
T AXIS: 50 DEGREES
T AXIS: 9 DEGREES
T OFFSET: 456 MS
T OFFSET: 458 MS
T4 FREE SERPL-MCNC: 0.73 NG/DL (ref 0.61–1.12)
TIBC SERPL-MCNC: 160 UG/DL (ref 240–445)
TRICUSPID ANNULAR PLANE SYSTOLIC EXCURSION: 2.2 CM
TRIGL SERPL-MCNC: 157 MG/DL (ref 0–149)
TSH SERPL-ACNC: 5.47 MIU/L (ref 0.44–3.98)
UIBC SERPL-MCNC: 134 UG/DL (ref 110–370)
UNIT ABO: NORMAL
UNIT ABO: NORMAL
UNIT NUMBER: NORMAL
UNIT NUMBER: NORMAL
UNIT RH: NORMAL
UNIT RH: NORMAL
UNIT VOLUME: 350
UNIT VOLUME: 350
UROBILINOGEN UR STRIP.AUTO-MCNC: <2 MG/DL
VENTRICULAR RATE: 74 BPM
VENTRICULAR RATE: 74 BPM
VIT B12 SERPL-MCNC: 899 PG/ML (ref 211–911)
VLDL: 31 MG/DL (ref 0–40)
WBC # BLD AUTO: 10.9 X10*3/UL (ref 4.4–11.3)
WBC # BLD AUTO: 13.9 X10*3/UL (ref 4.4–11.3)
WBC # BLD AUTO: 7.1 X10*3/UL (ref 4.4–11.3)
WBC # BLD AUTO: 7.7 X10*3/UL (ref 4.4–11.3)
WBC # BLD AUTO: 7.7 X10*3/UL (ref 4.4–11.3)
WBC # BLD AUTO: 8.2 X10*3/UL (ref 4.4–11.3)
WBC # BLD AUTO: 8.5 X10*3/UL (ref 4.4–11.3)
WBC # BLD AUTO: 8.5 X10*3/UL (ref 4.4–11.3)
WBC # BLD AUTO: 9.1 X10*3/UL (ref 4.4–11.3)
WBC #/AREA URNS AUTO: ABNORMAL /HPF
XM INTEP: NORMAL
XM INTEP: NORMAL

## 2024-01-01 PROCEDURE — 82947 ASSAY GLUCOSE BLOOD QUANT: CPT

## 2024-01-01 PROCEDURE — 99233 SBSQ HOSP IP/OBS HIGH 50: CPT | Performed by: NURSE PRACTITIONER

## 2024-01-01 PROCEDURE — 72125 CT NECK SPINE W/O DYE: CPT

## 2024-01-01 PROCEDURE — 2500000001 HC RX 250 WO HCPCS SELF ADMINISTERED DRUGS (ALT 637 FOR MEDICARE OP): Performed by: NURSE PRACTITIONER

## 2024-01-01 PROCEDURE — 97530 THERAPEUTIC ACTIVITIES: CPT | Mod: GO,CO

## 2024-01-01 PROCEDURE — 2500000001 HC RX 250 WO HCPCS SELF ADMINISTERED DRUGS (ALT 637 FOR MEDICARE OP): Performed by: INTERNAL MEDICINE

## 2024-01-01 PROCEDURE — P9016 RBC LEUKOCYTES REDUCED: HCPCS

## 2024-01-01 PROCEDURE — 73502 X-RAY EXAM HIP UNI 2-3 VIEWS: CPT | Mod: RIGHT SIDE | Performed by: RADIOLOGY

## 2024-01-01 PROCEDURE — 70551 MRI BRAIN STEM W/O DYE: CPT | Performed by: RADIOLOGY

## 2024-01-01 PROCEDURE — 97530 THERAPEUTIC ACTIVITIES: CPT | Mod: GP,CQ

## 2024-01-01 PROCEDURE — 2500000004 HC RX 250 GENERAL PHARMACY W/ HCPCS (ALT 636 FOR OP/ED): Performed by: INTERNAL MEDICINE

## 2024-01-01 PROCEDURE — 2060000001 HC INTERMEDIATE ICU ROOM DAILY

## 2024-01-01 PROCEDURE — 99233 SBSQ HOSP IP/OBS HIGH 50: CPT | Performed by: INTERNAL MEDICINE

## 2024-01-01 PROCEDURE — 71045 X-RAY EXAM CHEST 1 VIEW: CPT | Performed by: STUDENT IN AN ORGANIZED HEALTH CARE EDUCATION/TRAINING PROGRAM

## 2024-01-01 PROCEDURE — 36415 COLL VENOUS BLD VENIPUNCTURE: CPT | Performed by: INTERNAL MEDICINE

## 2024-01-01 PROCEDURE — 84484 ASSAY OF TROPONIN QUANT: CPT | Performed by: INTERNAL MEDICINE

## 2024-01-01 PROCEDURE — 85027 COMPLETE CBC AUTOMATED: CPT | Performed by: INTERNAL MEDICINE

## 2024-01-01 PROCEDURE — 7100000002 HC RECOVERY ROOM TIME - EACH INCREMENTAL 1 MINUTE: Performed by: STUDENT IN AN ORGANIZED HEALTH CARE EDUCATION/TRAINING PROGRAM

## 2024-01-01 PROCEDURE — 2500000004 HC RX 250 GENERAL PHARMACY W/ HCPCS (ALT 636 FOR OP/ED): Performed by: STUDENT IN AN ORGANIZED HEALTH CARE EDUCATION/TRAINING PROGRAM

## 2024-01-01 PROCEDURE — 80053 COMPREHEN METABOLIC PANEL: CPT | Performed by: INTERNAL MEDICINE

## 2024-01-01 PROCEDURE — 93005 ELECTROCARDIOGRAM TRACING: CPT

## 2024-01-01 PROCEDURE — 97162 PT EVAL MOD COMPLEX 30 MIN: CPT | Mod: GP

## 2024-01-01 PROCEDURE — 96375 TX/PRO/DX INJ NEW DRUG ADDON: CPT

## 2024-01-01 PROCEDURE — 82247 BILIRUBIN TOTAL: CPT | Performed by: INTERNAL MEDICINE

## 2024-01-01 PROCEDURE — 93306 TTE W/DOPPLER COMPLETE: CPT | Performed by: INTERNAL MEDICINE

## 2024-01-01 PROCEDURE — 83970 ASSAY OF PARATHORMONE: CPT | Mod: PORLAB | Performed by: NURSE PRACTITIONER

## 2024-01-01 PROCEDURE — 2500000002 HC RX 250 W HCPCS SELF ADMINISTERED DRUGS (ALT 637 FOR MEDICARE OP, ALT 636 FOR OP/ED): Performed by: INTERNAL MEDICINE

## 2024-01-01 PROCEDURE — 86920 COMPATIBILITY TEST SPIN: CPT

## 2024-01-01 PROCEDURE — 80048 BASIC METABOLIC PNL TOTAL CA: CPT | Performed by: INTERNAL MEDICINE

## 2024-01-01 PROCEDURE — 99223 1ST HOSP IP/OBS HIGH 75: CPT | Performed by: STUDENT IN AN ORGANIZED HEALTH CARE EDUCATION/TRAINING PROGRAM

## 2024-01-01 PROCEDURE — 94799 UNLISTED PULMONARY SVC/PX: CPT

## 2024-01-01 PROCEDURE — 76000 FLUOROSCOPY <1 HR PHYS/QHP: CPT

## 2024-01-01 PROCEDURE — 36430 TRANSFUSION BLD/BLD COMPNT: CPT

## 2024-01-01 PROCEDURE — 83605 ASSAY OF LACTIC ACID: CPT | Performed by: INTERNAL MEDICINE

## 2024-01-01 PROCEDURE — 83970 ASSAY OF PARATHORMONE: CPT

## 2024-01-01 PROCEDURE — 70450 CT HEAD/BRAIN W/O DYE: CPT

## 2024-01-01 PROCEDURE — 2500000004 HC RX 250 GENERAL PHARMACY W/ HCPCS (ALT 636 FOR OP/ED): Performed by: ANESTHESIOLOGY

## 2024-01-01 PROCEDURE — 2500000005 HC RX 250 GENERAL PHARMACY W/O HCPCS: Performed by: STUDENT IN AN ORGANIZED HEALTH CARE EDUCATION/TRAINING PROGRAM

## 2024-01-01 PROCEDURE — 97116 GAIT TRAINING THERAPY: CPT | Mod: GP,CQ

## 2024-01-01 PROCEDURE — 2020000001 HC ICU ROOM DAILY

## 2024-01-01 PROCEDURE — 99291 CRITICAL CARE FIRST HOUR: CPT | Performed by: INTERNAL MEDICINE

## 2024-01-01 PROCEDURE — 5A1935Z RESPIRATORY VENTILATION, LESS THAN 24 CONSECUTIVE HOURS: ICD-10-PCS | Performed by: STUDENT IN AN ORGANIZED HEALTH CARE EDUCATION/TRAINING PROGRAM

## 2024-01-01 PROCEDURE — 70450 CT HEAD/BRAIN W/O DYE: CPT | Performed by: STUDENT IN AN ORGANIZED HEALTH CARE EDUCATION/TRAINING PROGRAM

## 2024-01-01 PROCEDURE — 81001 URINALYSIS AUTO W/SCOPE: CPT | Performed by: NURSE PRACTITIONER

## 2024-01-01 PROCEDURE — 97530 THERAPEUTIC ACTIVITIES: CPT | Mod: GP,CQ | Performed by: PHYSICAL THERAPY ASSISTANT

## 2024-01-01 PROCEDURE — 94002 VENT MGMT INPAT INIT DAY: CPT

## 2024-01-01 PROCEDURE — 94681 O2 UPTK CO2 OUTP % O2 XTRC: CPT

## 2024-01-01 PROCEDURE — 94660 CPAP INITIATION&MGMT: CPT

## 2024-01-01 PROCEDURE — 85027 COMPLETE CBC AUTOMATED: CPT

## 2024-01-01 PROCEDURE — 73552 X-RAY EXAM OF FEMUR 2/>: CPT | Mod: RIGHT SIDE | Performed by: RADIOLOGY

## 2024-01-01 PROCEDURE — 2500000005 HC RX 250 GENERAL PHARMACY W/O HCPCS: Performed by: ANESTHESIOLOGY

## 2024-01-01 PROCEDURE — 97116 GAIT TRAINING THERAPY: CPT | Mod: GP,CQ | Performed by: PHYSICAL THERAPY ASSISTANT

## 2024-01-01 PROCEDURE — 93010 ELECTROCARDIOGRAM REPORT: CPT | Performed by: INTERNAL MEDICINE

## 2024-01-01 PROCEDURE — 3600000004 HC OR TIME - INITIAL BASE CHARGE - PROCEDURE LEVEL FOUR: Performed by: STUDENT IN AN ORGANIZED HEALTH CARE EDUCATION/TRAINING PROGRAM

## 2024-01-01 PROCEDURE — 96376 TX/PRO/DX INJ SAME DRUG ADON: CPT

## 2024-01-01 PROCEDURE — 96366 THER/PROPH/DIAG IV INF ADDON: CPT

## 2024-01-01 PROCEDURE — 2500000001 HC RX 250 WO HCPCS SELF ADMINISTERED DRUGS (ALT 637 FOR MEDICARE OP): Performed by: ANESTHESIOLOGY

## 2024-01-01 PROCEDURE — 3700000001 HC GENERAL ANESTHESIA TIME - INITIAL BASE CHARGE: Performed by: STUDENT IN AN ORGANIZED HEALTH CARE EDUCATION/TRAINING PROGRAM

## 2024-01-01 PROCEDURE — 83540 ASSAY OF IRON: CPT | Performed by: INTERNAL MEDICINE

## 2024-01-01 PROCEDURE — 92950 HEART/LUNG RESUSCITATION CPR: CPT | Performed by: STUDENT IN AN ORGANIZED HEALTH CARE EDUCATION/TRAINING PROGRAM

## 2024-01-01 PROCEDURE — 82607 VITAMIN B-12: CPT

## 2024-01-01 PROCEDURE — 83735 ASSAY OF MAGNESIUM: CPT | Performed by: INTERNAL MEDICINE

## 2024-01-01 PROCEDURE — 99239 HOSP IP/OBS DSCHRG MGMT >30: CPT | Performed by: INTERNAL MEDICINE

## 2024-01-01 PROCEDURE — 2500000004 HC RX 250 GENERAL PHARMACY W/ HCPCS (ALT 636 FOR OP/ED): Performed by: PHARMACIST

## 2024-01-01 PROCEDURE — 82435 ASSAY OF BLOOD CHLORIDE: CPT | Performed by: INTERNAL MEDICINE

## 2024-01-01 PROCEDURE — 82248 BILIRUBIN DIRECT: CPT | Performed by: INTERNAL MEDICINE

## 2024-01-01 PROCEDURE — 82306 VITAMIN D 25 HYDROXY: CPT

## 2024-01-01 PROCEDURE — 73502 X-RAY EXAM HIP UNI 2-3 VIEWS: CPT | Mod: RT

## 2024-01-01 PROCEDURE — 0QS604Z REPOSITION RIGHT UPPER FEMUR WITH INTERNAL FIXATION DEVICE, OPEN APPROACH: ICD-10-PCS | Performed by: STUDENT IN AN ORGANIZED HEALTH CARE EDUCATION/TRAINING PROGRAM

## 2024-01-01 PROCEDURE — 84132 ASSAY OF SERUM POTASSIUM: CPT | Performed by: INTERNAL MEDICINE

## 2024-01-01 PROCEDURE — 2720000007 HC OR 272 NO HCPCS: Performed by: STUDENT IN AN ORGANIZED HEALTH CARE EDUCATION/TRAINING PROGRAM

## 2024-01-01 PROCEDURE — 99285 EMERGENCY DEPT VISIT HI MDM: CPT | Mod: 25

## 2024-01-01 PROCEDURE — 2500000002 HC RX 250 W HCPCS SELF ADMINISTERED DRUGS (ALT 637 FOR MEDICARE OP, ALT 636 FOR OP/ED): Performed by: PHARMACIST

## 2024-01-01 PROCEDURE — 86850 RBC ANTIBODY SCREEN: CPT | Performed by: ANESTHESIOLOGY

## 2024-01-01 PROCEDURE — 2500000004 HC RX 250 GENERAL PHARMACY W/ HCPCS (ALT 636 FOR OP/ED): Performed by: LICENSED PRACTICAL NURSE

## 2024-01-01 PROCEDURE — 97166 OT EVAL MOD COMPLEX 45 MIN: CPT | Mod: GO

## 2024-01-01 PROCEDURE — 86901 BLOOD TYPING SEROLOGIC RH(D): CPT | Performed by: STUDENT IN AN ORGANIZED HEALTH CARE EDUCATION/TRAINING PROGRAM

## 2024-01-01 PROCEDURE — 2780000003 HC OR 278 NO HCPCS: Performed by: STUDENT IN AN ORGANIZED HEALTH CARE EDUCATION/TRAINING PROGRAM

## 2024-01-01 PROCEDURE — 2500000004 HC RX 250 GENERAL PHARMACY W/ HCPCS (ALT 636 FOR OP/ED)

## 2024-01-01 PROCEDURE — 73560 X-RAY EXAM OF KNEE 1 OR 2: CPT | Mod: RT

## 2024-01-01 PROCEDURE — 85025 COMPLETE CBC W/AUTO DIFF WBC: CPT | Performed by: STUDENT IN AN ORGANIZED HEALTH CARE EDUCATION/TRAINING PROGRAM

## 2024-01-01 PROCEDURE — 83690 ASSAY OF LIPASE: CPT | Performed by: STUDENT IN AN ORGANIZED HEALTH CARE EDUCATION/TRAINING PROGRAM

## 2024-01-01 PROCEDURE — 99223 1ST HOSP IP/OBS HIGH 75: CPT | Performed by: INTERNAL MEDICINE

## 2024-01-01 PROCEDURE — 85610 PROTHROMBIN TIME: CPT | Performed by: INTERNAL MEDICINE

## 2024-01-01 PROCEDURE — 97535 SELF CARE MNGMENT TRAINING: CPT | Mod: GO,CO

## 2024-01-01 PROCEDURE — 2500000001 HC RX 250 WO HCPCS SELF ADMINISTERED DRUGS (ALT 637 FOR MEDICARE OP): Performed by: STUDENT IN AN ORGANIZED HEALTH CARE EDUCATION/TRAINING PROGRAM

## 2024-01-01 PROCEDURE — 2500000005 HC RX 250 GENERAL PHARMACY W/O HCPCS: Performed by: LICENSED PRACTICAL NURSE

## 2024-01-01 PROCEDURE — 31500 INSERT EMERGENCY AIRWAY: CPT

## 2024-01-01 PROCEDURE — C1713 ANCHOR/SCREW BN/BN,TIS/BN: HCPCS | Performed by: STUDENT IN AN ORGANIZED HEALTH CARE EDUCATION/TRAINING PROGRAM

## 2024-01-01 PROCEDURE — 84100 ASSAY OF PHOSPHORUS: CPT

## 2024-01-01 PROCEDURE — 82746 ASSAY OF FOLIC ACID SERUM: CPT | Performed by: INTERNAL MEDICINE

## 2024-01-01 PROCEDURE — 93306 TTE W/DOPPLER COMPLETE: CPT

## 2024-01-01 PROCEDURE — 73560 X-RAY EXAM OF KNEE 1 OR 2: CPT | Mod: RIGHT SIDE | Performed by: RADIOLOGY

## 2024-01-01 PROCEDURE — 70450 CT HEAD/BRAIN W/O DYE: CPT | Performed by: RADIOLOGY

## 2024-01-01 PROCEDURE — 1210000001 HC SEMI-PRIVATE ROOM DAILY

## 2024-01-01 PROCEDURE — 99223 1ST HOSP IP/OBS HIGH 75: CPT | Performed by: PSYCHIATRY & NEUROLOGY

## 2024-01-01 PROCEDURE — 80053 COMPREHEN METABOLIC PANEL: CPT

## 2024-01-01 PROCEDURE — 95816 EEG AWAKE AND DROWSY: CPT | Performed by: PSYCHIATRY & NEUROLOGY

## 2024-01-01 PROCEDURE — 99232 SBSQ HOSP IP/OBS MODERATE 35: CPT | Performed by: NURSE PRACTITIONER

## 2024-01-01 PROCEDURE — 92950 HEART/LUNG RESUSCITATION CPR: CPT

## 2024-01-01 PROCEDURE — 95816 EEG AWAKE AND DROWSY: CPT

## 2024-01-01 PROCEDURE — 7100000001 HC RECOVERY ROOM TIME - INITIAL BASE CHARGE: Performed by: STUDENT IN AN ORGANIZED HEALTH CARE EDUCATION/TRAINING PROGRAM

## 2024-01-01 PROCEDURE — 2500000002 HC RX 250 W HCPCS SELF ADMINISTERED DRUGS (ALT 637 FOR MEDICARE OP, ALT 636 FOR OP/ED): Performed by: STUDENT IN AN ORGANIZED HEALTH CARE EDUCATION/TRAINING PROGRAM

## 2024-01-01 PROCEDURE — 36415 COLL VENOUS BLD VENIPUNCTURE: CPT

## 2024-01-01 PROCEDURE — 84300 ASSAY OF URINE SODIUM: CPT | Performed by: NURSE PRACTITIONER

## 2024-01-01 PROCEDURE — 2500000005 HC RX 250 GENERAL PHARMACY W/O HCPCS: Performed by: INTERNAL MEDICINE

## 2024-01-01 PROCEDURE — 72125 CT NECK SPINE W/O DYE: CPT | Performed by: STUDENT IN AN ORGANIZED HEALTH CARE EDUCATION/TRAINING PROGRAM

## 2024-01-01 PROCEDURE — 96365 THER/PROPH/DIAG IV INF INIT: CPT

## 2024-01-01 PROCEDURE — C1769 GUIDE WIRE: HCPCS | Performed by: STUDENT IN AN ORGANIZED HEALTH CARE EDUCATION/TRAINING PROGRAM

## 2024-01-01 PROCEDURE — 80061 LIPID PANEL: CPT

## 2024-01-01 PROCEDURE — 83735 ASSAY OF MAGNESIUM: CPT | Performed by: NURSE PRACTITIONER

## 2024-01-01 PROCEDURE — 36415 COLL VENOUS BLD VENIPUNCTURE: CPT | Performed by: STUDENT IN AN ORGANIZED HEALTH CARE EDUCATION/TRAINING PROGRAM

## 2024-01-01 PROCEDURE — 80053 COMPREHEN METABOLIC PANEL: CPT | Performed by: STUDENT IN AN ORGANIZED HEALTH CARE EDUCATION/TRAINING PROGRAM

## 2024-01-01 PROCEDURE — 84443 ASSAY THYROID STIM HORMONE: CPT

## 2024-01-01 PROCEDURE — 2500000004 HC RX 250 GENERAL PHARMACY W/ HCPCS (ALT 636 FOR OP/ED): Performed by: EMERGENCY MEDICINE

## 2024-01-01 PROCEDURE — 97110 THERAPEUTIC EXERCISES: CPT | Mod: GP,CQ | Performed by: PHYSICAL THERAPY ASSISTANT

## 2024-01-01 PROCEDURE — 97112 NEUROMUSCULAR REEDUCATION: CPT | Mod: GP

## 2024-01-01 PROCEDURE — 82805 BLOOD GASES W/O2 SATURATION: CPT | Performed by: INTERNAL MEDICINE

## 2024-01-01 PROCEDURE — 70551 MRI BRAIN STEM W/O DYE: CPT

## 2024-01-01 PROCEDURE — 27245 TREAT THIGH FRACTURE: CPT | Performed by: STUDENT IN AN ORGANIZED HEALTH CARE EDUCATION/TRAINING PROGRAM

## 2024-01-01 PROCEDURE — 99231 SBSQ HOSP IP/OBS SF/LOW 25: CPT | Performed by: NURSE PRACTITIONER

## 2024-01-01 PROCEDURE — 3600000009 HC OR TIME - EACH INCREMENTAL 1 MINUTE - PROCEDURE LEVEL FOUR: Performed by: STUDENT IN AN ORGANIZED HEALTH CARE EDUCATION/TRAINING PROGRAM

## 2024-01-01 PROCEDURE — 51702 INSERT TEMP BLADDER CATH: CPT

## 2024-01-01 PROCEDURE — 83036 HEMOGLOBIN GLYCOSYLATED A1C: CPT

## 2024-01-01 PROCEDURE — 71045 X-RAY EXAM CHEST 1 VIEW: CPT

## 2024-01-01 PROCEDURE — 84439 ASSAY OF FREE THYROXINE: CPT

## 2024-01-01 PROCEDURE — 82043 UR ALBUMIN QUANTITATIVE: CPT | Performed by: NURSE PRACTITIONER

## 2024-01-01 PROCEDURE — 3700000002 HC GENERAL ANESTHESIA TIME - EACH INCREMENTAL 1 MINUTE: Performed by: STUDENT IN AN ORGANIZED HEALTH CARE EDUCATION/TRAINING PROGRAM

## 2024-01-01 PROCEDURE — 83735 ASSAY OF MAGNESIUM: CPT | Performed by: STUDENT IN AN ORGANIZED HEALTH CARE EDUCATION/TRAINING PROGRAM

## 2024-01-01 PROCEDURE — 73552 X-RAY EXAM OF FEMUR 2/>: CPT | Mod: RT

## 2024-01-01 PROCEDURE — 0BH17EZ INSERTION OF ENDOTRACHEAL AIRWAY INTO TRACHEA, VIA NATURAL OR ARTIFICIAL OPENING: ICD-10-PCS | Performed by: STUDENT IN AN ORGANIZED HEALTH CARE EDUCATION/TRAINING PROGRAM

## 2024-01-01 PROCEDURE — 97110 THERAPEUTIC EXERCISES: CPT | Mod: GP

## 2024-01-01 PROCEDURE — 31500 INSERT EMERGENCY AIRWAY: CPT | Performed by: INTERNAL MEDICINE

## 2024-01-01 DEVICE — IMPLANTABLE DEVICE: Type: IMPLANTABLE DEVICE | Site: HIP | Status: FUNCTIONAL

## 2024-01-01 DEVICE — SCREW, TFNA, 90MM, STERILE: Type: IMPLANTABLE DEVICE | Site: HIP | Status: FUNCTIONAL

## 2024-01-01 DEVICE — SCREW, TFNA, 85MM, STERILE: Type: IMPLANTABLE DEVICE | Site: HIP | Status: FUNCTIONAL

## 2024-01-01 RX ORDER — DEXTROSE MONOHYDRATE AND SODIUM CHLORIDE 5; .45 G/100ML; G/100ML
50 INJECTION, SOLUTION INTRAVENOUS CONTINUOUS
Status: DISCONTINUED | OUTPATIENT
Start: 2024-01-01 | End: 2024-01-01

## 2024-01-01 RX ORDER — METOCLOPRAMIDE HYDROCHLORIDE 5 MG/ML
10 INJECTION INTRAMUSCULAR; INTRAVENOUS ONCE
Status: COMPLETED | OUTPATIENT
Start: 2024-01-01 | End: 2024-01-01

## 2024-01-01 RX ORDER — ACETAMINOPHEN 650 MG/1
650 SUPPOSITORY RECTAL EVERY 4 HOURS PRN
Status: DISCONTINUED | OUTPATIENT
Start: 2024-01-01 | End: 2024-01-01 | Stop reason: HOSPADM

## 2024-01-01 RX ORDER — EPINEPHRINE 0.1 MG/ML
INJECTION INTRACARDIAC; INTRAVENOUS CODE/TRAUMA/SEDATION MEDICATION
Status: COMPLETED | OUTPATIENT
Start: 2024-01-01 | End: 2024-01-01

## 2024-01-01 RX ORDER — NAPROXEN SODIUM 220 MG/1
81 TABLET, FILM COATED ORAL DAILY
Status: DISCONTINUED | OUTPATIENT
Start: 2024-01-01 | End: 2024-01-01 | Stop reason: HOSPADM

## 2024-01-01 RX ORDER — ONDANSETRON HYDROCHLORIDE 2 MG/ML
INJECTION, SOLUTION INTRAVENOUS
Status: COMPLETED
Start: 2024-01-01 | End: 2024-01-01

## 2024-01-01 RX ORDER — INSULIN LISPRO 100 [IU]/ML
0-10 INJECTION, SOLUTION INTRAVENOUS; SUBCUTANEOUS
Status: DISCONTINUED | OUTPATIENT
Start: 2024-01-01 | End: 2024-01-01

## 2024-01-01 RX ORDER — ONDANSETRON 4 MG/1
4 TABLET, FILM COATED ORAL EVERY 8 HOURS PRN
Status: DISCONTINUED | OUTPATIENT
Start: 2024-01-01 | End: 2024-01-01 | Stop reason: HOSPADM

## 2024-01-01 RX ORDER — PANTOPRAZOLE SODIUM 40 MG/10ML
40 INJECTION, POWDER, LYOPHILIZED, FOR SOLUTION INTRAVENOUS DAILY
Status: DISCONTINUED | OUTPATIENT
Start: 2024-01-01 | End: 2024-01-01

## 2024-01-01 RX ORDER — INDOMETHACIN 25 MG/1
CAPSULE ORAL CODE/TRAUMA/SEDATION MEDICATION
Status: COMPLETED | OUTPATIENT
Start: 2024-01-01 | End: 2024-01-01

## 2024-01-01 RX ORDER — METOPROLOL SUCCINATE 25 MG/1
25 TABLET, EXTENDED RELEASE ORAL DAILY
Status: DISCONTINUED | OUTPATIENT
Start: 2024-01-01 | End: 2024-01-01 | Stop reason: HOSPADM

## 2024-01-01 RX ORDER — INSULIN LISPRO 100 [IU]/ML
0-10 INJECTION, SOLUTION INTRAVENOUS; SUBCUTANEOUS EVERY 4 HOURS
Status: DISCONTINUED | OUTPATIENT
Start: 2024-01-01 | End: 2024-01-01

## 2024-01-01 RX ORDER — ROPIVACAINE/EPI/CLONIDINE/KET 2.46-0.005
SYRINGE (ML) INJECTION AS NEEDED
Status: DISCONTINUED | OUTPATIENT
Start: 2024-01-01 | End: 2024-01-01 | Stop reason: HOSPADM

## 2024-01-01 RX ORDER — FAMOTIDINE 10 MG/ML
20 INJECTION INTRAVENOUS ONCE
Status: COMPLETED | OUTPATIENT
Start: 2024-01-01 | End: 2024-01-01

## 2024-01-01 RX ORDER — LORAZEPAM 2 MG/ML
2 INJECTION INTRAMUSCULAR EVERY 4 HOURS PRN
Status: DISCONTINUED | OUTPATIENT
Start: 2024-01-01 | End: 2024-01-01 | Stop reason: HOSPADM

## 2024-01-01 RX ORDER — OLANZAPINE 10 MG/2ML
2.5 INJECTION, POWDER, FOR SOLUTION INTRAMUSCULAR ONCE
Status: COMPLETED | OUTPATIENT
Start: 2024-01-01 | End: 2024-01-01

## 2024-01-01 RX ORDER — CHOLECALCIFEROL (VITAMIN D3) 50 MCG
2000 TABLET ORAL DAILY
COMMUNITY
End: 2024-01-01 | Stop reason: HOSPADM

## 2024-01-01 RX ORDER — PROPOFOL 10 MG/ML
5-20 INJECTION, EMULSION INTRAVENOUS CONTINUOUS
Status: DISCONTINUED | OUTPATIENT
Start: 2024-01-01 | End: 2024-01-01

## 2024-01-01 RX ORDER — DEXTROSE 50 % IN WATER (D50W) INTRAVENOUS SYRINGE
25
Status: DISCONTINUED | OUTPATIENT
Start: 2024-01-01 | End: 2024-01-01 | Stop reason: HOSPADM

## 2024-01-01 RX ORDER — SODIUM CITRATE AND CITRIC ACID MONOHYDRATE 334; 500 MG/5ML; MG/5ML
30 SOLUTION ORAL ONCE
Status: COMPLETED | OUTPATIENT
Start: 2024-01-01 | End: 2024-01-01

## 2024-01-01 RX ORDER — GUAIFENESIN/DEXTROMETHORPHAN 100-10MG/5
5 SYRUP ORAL EVERY 4 HOURS PRN
Status: DISCONTINUED | OUTPATIENT
Start: 2024-01-01 | End: 2024-01-01 | Stop reason: HOSPADM

## 2024-01-01 RX ORDER — HYDRALAZINE HYDROCHLORIDE 20 MG/ML
5 INJECTION INTRAMUSCULAR; INTRAVENOUS ONCE
Status: DISCONTINUED | OUTPATIENT
Start: 2024-01-01 | End: 2024-01-01 | Stop reason: HOSPADM

## 2024-01-01 RX ORDER — OXYCODONE HYDROCHLORIDE 5 MG/1
5 TABLET ORAL
Status: DISCONTINUED | OUTPATIENT
Start: 2024-01-01 | End: 2024-01-01 | Stop reason: HOSPADM

## 2024-01-01 RX ORDER — ALBUTEROL SULFATE 0.83 MG/ML
2.5 SOLUTION RESPIRATORY (INHALATION) ONCE
Status: DISCONTINUED | OUTPATIENT
Start: 2024-01-01 | End: 2024-01-01 | Stop reason: HOSPADM

## 2024-01-01 RX ORDER — ONDANSETRON HYDROCHLORIDE 2 MG/ML
4 INJECTION, SOLUTION INTRAVENOUS EVERY 8 HOURS PRN
Status: DISCONTINUED | OUTPATIENT
Start: 2024-01-01 | End: 2024-01-01 | Stop reason: HOSPADM

## 2024-01-01 RX ORDER — LISINOPRIL 20 MG/1
20 TABLET ORAL DAILY
Status: DISCONTINUED | OUTPATIENT
Start: 2024-01-01 | End: 2024-01-01

## 2024-01-01 RX ORDER — FENTANYL CITRATE 50 UG/ML
INJECTION, SOLUTION INTRAMUSCULAR; INTRAVENOUS AS NEEDED
Status: DISCONTINUED | OUTPATIENT
Start: 2024-01-01 | End: 2024-01-01

## 2024-01-01 RX ORDER — FERROUS SULFATE 325(65) MG
325 TABLET, DELAYED RELEASE (ENTERIC COATED) ORAL EVERY OTHER DAY
COMMUNITY
End: 2024-01-01 | Stop reason: HOSPADM

## 2024-01-01 RX ORDER — DEXAMETHASONE SODIUM PHOSPHATE 4 MG/ML
INJECTION, SOLUTION INTRA-ARTICULAR; INTRALESIONAL; INTRAMUSCULAR; INTRAVENOUS; SOFT TISSUE AS NEEDED
Status: DISCONTINUED | OUTPATIENT
Start: 2024-01-01 | End: 2024-01-01

## 2024-01-01 RX ORDER — DOCUSATE SODIUM 100 MG/1
100 CAPSULE, LIQUID FILLED ORAL 2 TIMES DAILY
Status: DISCONTINUED | OUTPATIENT
Start: 2024-01-01 | End: 2024-01-01 | Stop reason: HOSPADM

## 2024-01-01 RX ORDER — MORPHINE SULFATE 2 MG/ML
1 INJECTION, SOLUTION INTRAMUSCULAR; INTRAVENOUS EVERY 5 MIN PRN
Status: DISCONTINUED | OUTPATIENT
Start: 2024-01-01 | End: 2024-01-01 | Stop reason: HOSPADM

## 2024-01-01 RX ORDER — DEXTROSE MONOHYDRATE 100 MG/ML
0.3 INJECTION, SOLUTION INTRAVENOUS ONCE AS NEEDED
Status: DISCONTINUED | OUTPATIENT
Start: 2024-01-01 | End: 2024-01-01 | Stop reason: HOSPADM

## 2024-01-01 RX ORDER — ONDANSETRON HYDROCHLORIDE 2 MG/ML
INJECTION, SOLUTION INTRAVENOUS AS NEEDED
Status: DISCONTINUED | OUTPATIENT
Start: 2024-01-01 | End: 2024-01-01

## 2024-01-01 RX ORDER — LIDOCAINE HYDROCHLORIDE 20 MG/ML
INJECTION, SOLUTION INFILTRATION; PERINEURAL AS NEEDED
Status: DISCONTINUED | OUTPATIENT
Start: 2024-01-01 | End: 2024-01-01

## 2024-01-01 RX ORDER — FENTANYL CITRATE-0.9 % NACL/PF 10 MCG/ML
25-200 PLASTIC BAG, INJECTION (ML) INTRAVENOUS CONTINUOUS
Status: DISCONTINUED | OUTPATIENT
Start: 2024-01-01 | End: 2024-01-01

## 2024-01-01 RX ORDER — CEFAZOLIN SODIUM 2 G/100ML
2 INJECTION, SOLUTION INTRAVENOUS EVERY 8 HOURS
Status: DISPENSED | OUTPATIENT
Start: 2024-01-01 | End: 2024-01-01

## 2024-01-01 RX ORDER — SIMVASTATIN 20 MG/1
40 TABLET, FILM COATED ORAL NIGHTLY
Status: DISCONTINUED | OUTPATIENT
Start: 2024-01-01 | End: 2024-01-01 | Stop reason: HOSPADM

## 2024-01-01 RX ORDER — SODIUM CHLORIDE 9 MG/ML
50 INJECTION, SOLUTION INTRAVENOUS CONTINUOUS
Status: DISCONTINUED | OUTPATIENT
Start: 2024-01-01 | End: 2024-01-01

## 2024-01-01 RX ORDER — MIDAZOLAM HYDROCHLORIDE 1 MG/ML
1 INJECTION, SOLUTION INTRAMUSCULAR; INTRAVENOUS ONCE
Status: COMPLETED | OUTPATIENT
Start: 2024-01-01 | End: 2024-01-01

## 2024-01-01 RX ORDER — POTASSIUM CHLORIDE 20 MEQ/1
20 TABLET, EXTENDED RELEASE ORAL ONCE
Status: COMPLETED | OUTPATIENT
Start: 2024-01-01 | End: 2024-01-01

## 2024-01-01 RX ORDER — LEVETIRACETAM 500 MG/1
500 TABLET ORAL 2 TIMES DAILY
Status: DISCONTINUED | OUTPATIENT
Start: 2024-01-01 | End: 2024-01-01 | Stop reason: HOSPADM

## 2024-01-01 RX ORDER — ONDANSETRON HYDROCHLORIDE 2 MG/ML
4 INJECTION, SOLUTION INTRAVENOUS ONCE
Status: COMPLETED | OUTPATIENT
Start: 2024-01-01 | End: 2024-01-01

## 2024-01-01 RX ORDER — INSULIN LISPRO 100 [IU]/ML
0-10 INJECTION, SOLUTION INTRAVENOUS; SUBCUTANEOUS
Status: DISCONTINUED | OUTPATIENT
Start: 2024-01-01 | End: 2024-01-01 | Stop reason: HOSPADM

## 2024-01-01 RX ORDER — POLYETHYLENE GLYCOL 3350 17 G/17G
17 POWDER, FOR SOLUTION ORAL DAILY
Status: DISCONTINUED | OUTPATIENT
Start: 2024-01-01 | End: 2024-01-01 | Stop reason: HOSPADM

## 2024-01-01 RX ORDER — MAGNESIUM SULFATE HEPTAHYDRATE 40 MG/ML
4 INJECTION, SOLUTION INTRAVENOUS ONCE
Status: COMPLETED | OUTPATIENT
Start: 2024-01-01 | End: 2024-01-01

## 2024-01-01 RX ORDER — MORPHINE SULFATE 4 MG/ML
4 INJECTION, SOLUTION INTRAMUSCULAR; INTRAVENOUS ONCE
Status: COMPLETED | OUTPATIENT
Start: 2024-01-01 | End: 2024-01-01

## 2024-01-01 RX ORDER — MORPHINE SULFATE 4 MG/ML
4 INJECTION, SOLUTION INTRAMUSCULAR; INTRAVENOUS ONCE
Status: DISCONTINUED | OUTPATIENT
Start: 2024-01-01 | End: 2024-01-01

## 2024-01-01 RX ORDER — HALOPERIDOL 5 MG/ML
5 INJECTION INTRAMUSCULAR EVERY 4 HOURS PRN
Status: DISCONTINUED | OUTPATIENT
Start: 2024-01-01 | End: 2024-01-01 | Stop reason: SINTOL

## 2024-01-01 RX ORDER — ONDANSETRON HYDROCHLORIDE 2 MG/ML
4 INJECTION, SOLUTION INTRAVENOUS ONCE AS NEEDED
Status: DISCONTINUED | OUTPATIENT
Start: 2024-01-01 | End: 2024-01-01 | Stop reason: HOSPADM

## 2024-01-01 RX ORDER — TRANEXAMIC ACID 10 MG/ML
INJECTION, SOLUTION INTRAVENOUS AS NEEDED
Status: DISCONTINUED | OUTPATIENT
Start: 2024-01-01 | End: 2024-01-01

## 2024-01-01 RX ORDER — LORAZEPAM 2 MG/ML
2 INJECTION INTRAMUSCULAR EVERY 4 HOURS PRN
Status: ACTIVE | OUTPATIENT
Start: 2024-01-01 | End: 2024-01-01

## 2024-01-01 RX ORDER — HALOPERIDOL 5 MG/ML
INJECTION INTRAMUSCULAR
Status: COMPLETED
Start: 2024-01-01 | End: 2024-01-01

## 2024-01-01 RX ORDER — ACETAMINOPHEN 325 MG/1
650 TABLET ORAL EVERY 4 HOURS PRN
Status: DISCONTINUED | OUTPATIENT
Start: 2024-01-01 | End: 2024-01-01 | Stop reason: HOSPADM

## 2024-01-01 RX ORDER — HYDRALAZINE HYDROCHLORIDE 20 MG/ML
5 INJECTION INTRAMUSCULAR; INTRAVENOUS ONCE
Status: COMPLETED | OUTPATIENT
Start: 2024-01-01 | End: 2024-01-01

## 2024-01-01 RX ORDER — CEFAZOLIN SODIUM 2 G/100ML
INJECTION, SOLUTION INTRAVENOUS AS NEEDED
Status: DISCONTINUED | OUTPATIENT
Start: 2024-01-01 | End: 2024-01-01

## 2024-01-01 RX ORDER — ENOXAPARIN SODIUM 100 MG/ML
30 INJECTION SUBCUTANEOUS EVERY 24 HOURS
Status: DISCONTINUED | OUTPATIENT
Start: 2024-01-01 | End: 2024-01-01

## 2024-01-01 RX ORDER — DEXTROSE MONOHYDRATE AND SODIUM CHLORIDE 5; .45 G/100ML; G/100ML
60 INJECTION, SOLUTION INTRAVENOUS CONTINUOUS
Status: DISCONTINUED | OUTPATIENT
Start: 2024-01-01 | End: 2024-01-01

## 2024-01-01 RX ORDER — GUAIFENESIN 600 MG/1
600 TABLET, EXTENDED RELEASE ORAL EVERY 12 HOURS PRN
Status: DISCONTINUED | OUTPATIENT
Start: 2024-01-01 | End: 2024-01-01 | Stop reason: HOSPADM

## 2024-01-01 RX ORDER — MAGNESIUM SULFATE HEPTAHYDRATE 40 MG/ML
2 INJECTION, SOLUTION INTRAVENOUS ONCE
Status: COMPLETED | OUTPATIENT
Start: 2024-01-01 | End: 2024-01-01

## 2024-01-01 RX ORDER — POTASSIUM CHLORIDE 1.5 G/1.58G
20 POWDER, FOR SOLUTION ORAL ONCE
Status: COMPLETED | OUTPATIENT
Start: 2024-01-01 | End: 2024-01-01

## 2024-01-01 RX ORDER — MIDAZOLAM HYDROCHLORIDE 1 MG/ML
2 INJECTION, SOLUTION INTRAMUSCULAR; INTRAVENOUS ONCE
Status: DISCONTINUED | OUTPATIENT
Start: 2024-01-01 | End: 2024-01-01

## 2024-01-01 RX ORDER — ACETAMINOPHEN 160 MG/5ML
650 SUSPENSION ORAL EVERY 4 HOURS PRN
Status: DISCONTINUED | OUTPATIENT
Start: 2024-01-01 | End: 2024-01-01 | Stop reason: HOSPADM

## 2024-01-01 RX ORDER — MORPHINE SULFATE 2 MG/ML
2 INJECTION, SOLUTION INTRAMUSCULAR; INTRAVENOUS EVERY 5 MIN PRN
Status: DISCONTINUED | OUTPATIENT
Start: 2024-01-01 | End: 2024-01-01 | Stop reason: HOSPADM

## 2024-01-01 RX ORDER — PROPOFOL 10 MG/ML
INJECTION, EMULSION INTRAVENOUS AS NEEDED
Status: DISCONTINUED | OUTPATIENT
Start: 2024-01-01 | End: 2024-01-01

## 2024-01-01 RX ORDER — ROCURONIUM BROMIDE 10 MG/ML
INJECTION, SOLUTION INTRAVENOUS AS NEEDED
Status: DISCONTINUED | OUTPATIENT
Start: 2024-01-01 | End: 2024-01-01

## 2024-01-01 RX ORDER — UBIDECARENONE 75 MG
500 CAPSULE ORAL DAILY
COMMUNITY
End: 2024-01-01 | Stop reason: HOSPADM

## 2024-01-01 RX ORDER — HYDROMORPHONE HYDROCHLORIDE 1 MG/ML
INJECTION, SOLUTION INTRAMUSCULAR; INTRAVENOUS; SUBCUTANEOUS AS NEEDED
Status: DISCONTINUED | OUTPATIENT
Start: 2024-01-01 | End: 2024-01-01

## 2024-01-01 RX ORDER — METOPROLOL SUCCINATE 25 MG/1
25 TABLET, EXTENDED RELEASE ORAL DAILY
Qty: 90 TABLET | Refills: 1 | Status: SHIPPED | OUTPATIENT
Start: 2024-01-01 | End: 2024-01-01 | Stop reason: HOSPADM

## 2024-01-01 RX ADMIN — POLYETHYLENE GLYCOL 3350 17 G: 17 POWDER, FOR SOLUTION ORAL at 09:53

## 2024-01-01 RX ADMIN — FENTANYL CITRATE 25 MCG/HR: 50 INJECTION, SOLUTION INTRAMUSCULAR; INTRAVENOUS at 03:23

## 2024-01-01 RX ADMIN — LORAZEPAM 2 MG: 2 INJECTION INTRAMUSCULAR; INTRAVENOUS at 09:40

## 2024-01-01 RX ADMIN — ONDANSETRON 4 MG: 2 INJECTION INTRAMUSCULAR; INTRAVENOUS at 19:42

## 2024-01-01 RX ADMIN — METOCLOPRAMIDE 10 MG: 5 INJECTION, SOLUTION INTRAMUSCULAR; INTRAVENOUS at 15:54

## 2024-01-01 RX ADMIN — HYDROCHLOROTHIAZIDE: 25 TABLET ORAL at 10:07

## 2024-01-01 RX ADMIN — INSULIN LISPRO 4 UNITS: 100 INJECTION, SOLUTION INTRAVENOUS; SUBCUTANEOUS at 09:46

## 2024-01-01 RX ADMIN — ROCURONIUM BROMIDE 30 MG: 10 INJECTION, SOLUTION INTRAVENOUS at 17:39

## 2024-01-01 RX ADMIN — POLYETHYLENE GLYCOL 3350 17 G: 17 POWDER, FOR SOLUTION ORAL at 12:51

## 2024-01-01 RX ADMIN — INSULIN LISPRO 2 UNITS: 100 INJECTION, SOLUTION INTRAVENOUS; SUBCUTANEOUS at 18:16

## 2024-01-01 RX ADMIN — OXYCODONE HYDROCHLORIDE 5 MG: 5 TABLET ORAL at 05:08

## 2024-01-01 RX ADMIN — SODIUM BICARBONATE 50 MEQ: 84 INJECTION, SOLUTION INTRAVENOUS at 01:26

## 2024-01-01 RX ADMIN — GUAIFENESIN AND DEXTROMETHORPHAN 5 ML: 100; 10 SYRUP ORAL at 20:32

## 2024-01-01 RX ADMIN — ASPIRIN 81 MG CHEWABLE TABLET 81 MG: 81 TABLET CHEWABLE at 12:51

## 2024-01-01 RX ADMIN — METOPROLOL SUCCINATE 25 MG: 25 TABLET, EXTENDED RELEASE ORAL at 08:36

## 2024-01-01 RX ADMIN — POTASSIUM CHLORIDE 20 MEQ: 1500 TABLET, EXTENDED RELEASE ORAL at 09:00

## 2024-01-01 RX ADMIN — LEVETIRACETAM 500 MG: 500 TABLET, FILM COATED ORAL at 09:29

## 2024-01-01 RX ADMIN — EPINEPHRINE 1 MG: 0.1 INJECTION INTRAVENOUS at 03:44

## 2024-01-01 RX ADMIN — DOCUSATE SODIUM 100 MG: 100 CAPSULE, LIQUID FILLED ORAL at 22:06

## 2024-01-01 RX ADMIN — ACETAMINOPHEN 650 MG: 325 TABLET ORAL at 23:05

## 2024-01-01 RX ADMIN — ASPIRIN 81 MG CHEWABLE TABLET 81 MG: 81 TABLET CHEWABLE at 09:52

## 2024-01-01 RX ADMIN — TRANEXAMIC ACID 1000 MG: 10 INJECTION, SOLUTION INTRAVENOUS at 17:42

## 2024-01-01 RX ADMIN — NITROGLYCERIN 0.5 INCH: 20 OINTMENT TOPICAL at 15:54

## 2024-01-01 RX ADMIN — ONDANSETRON 4 MG: 2 INJECTION INTRAMUSCULAR; INTRAVENOUS at 20:58

## 2024-01-01 RX ADMIN — HALOPERIDOL LACTATE 5 MG: 5 INJECTION, SOLUTION INTRAMUSCULAR at 12:33

## 2024-01-01 RX ADMIN — HYDRALAZINE HYDROCHLORIDE 5 MG: 20 INJECTION INTRAMUSCULAR; INTRAVENOUS at 20:38

## 2024-01-01 RX ADMIN — HYDROMORPHONE HYDROCHLORIDE 0.5 MG: 0.5 INJECTION, SOLUTION INTRAMUSCULAR; INTRAVENOUS; SUBCUTANEOUS at 22:41

## 2024-01-01 RX ADMIN — DOCUSATE SODIUM 100 MG: 100 CAPSULE, LIQUID FILLED ORAL at 08:36

## 2024-01-01 RX ADMIN — MAGNESIUM SULFATE HEPTAHYDRATE 4 G: 40 INJECTION, SOLUTION INTRAVENOUS at 22:30

## 2024-01-01 RX ADMIN — METOPROLOL SUCCINATE 25 MG: 25 TABLET, EXTENDED RELEASE ORAL at 09:52

## 2024-01-01 RX ADMIN — DEXTROSE AND SODIUM CHLORIDE 60 ML/HR: 5; 450 INJECTION, SOLUTION INTRAVENOUS at 05:29

## 2024-01-01 RX ADMIN — MIDAZOLAM 1 MG: 1 INJECTION INTRAMUSCULAR; INTRAVENOUS at 18:30

## 2024-01-01 RX ADMIN — ACETAMINOPHEN 650 MG: 325 TABLET ORAL at 20:32

## 2024-01-01 RX ADMIN — SUGAMMADEX 200 MG: 100 INJECTION, SOLUTION INTRAVENOUS at 19:42

## 2024-01-01 RX ADMIN — METOPROLOL SUCCINATE 25 MG: 25 TABLET, EXTENDED RELEASE ORAL at 09:29

## 2024-01-01 RX ADMIN — GUAIFENESIN AND DEXTROMETHORPHAN 5 ML: 100; 10 SYRUP ORAL at 05:07

## 2024-01-01 RX ADMIN — LEVETIRACETAM 500 MG: 500 TABLET, FILM COATED ORAL at 22:06

## 2024-01-01 RX ADMIN — INSULIN LISPRO 2 UNITS: 100 INJECTION, SOLUTION INTRAVENOUS; SUBCUTANEOUS at 09:39

## 2024-01-01 RX ADMIN — ASPIRIN 81 MG CHEWABLE TABLET 81 MG: 81 TABLET CHEWABLE at 09:29

## 2024-01-01 RX ADMIN — POTASSIUM CHLORIDE 20 MEQ: 1.5 POWDER, FOR SOLUTION ORAL at 06:48

## 2024-01-01 RX ADMIN — DOCUSATE SODIUM 100 MG: 100 CAPSULE, LIQUID FILLED ORAL at 09:52

## 2024-01-01 RX ADMIN — EPINEPHRINE 1 MG: 0.1 INJECTION INTRAVENOUS at 01:25

## 2024-01-01 RX ADMIN — LIDOCAINE HYDROCHLORIDE 50 MG: 20 INJECTION, SOLUTION INFILTRATION; PERINEURAL at 17:39

## 2024-01-01 RX ADMIN — HYDROMORPHONE HYDROCHLORIDE 0.5 MG: 1 INJECTION, SOLUTION INTRAMUSCULAR; INTRAVENOUS; SUBCUTANEOUS at 19:49

## 2024-01-01 RX ADMIN — DOCUSATE SODIUM 100 MG: 100 CAPSULE, LIQUID FILLED ORAL at 21:36

## 2024-01-01 RX ADMIN — CEFAZOLIN SODIUM 2 G: 2 INJECTION, SOLUTION INTRAVENOUS at 09:06

## 2024-01-01 RX ADMIN — LEVETIRACETAM 500 MG: 500 TABLET, FILM COATED ORAL at 21:36

## 2024-01-01 RX ADMIN — IRON SUCROSE 200 MG: 20 INJECTION, SOLUTION INTRAVENOUS at 14:18

## 2024-01-01 RX ADMIN — Medication: at 16:22

## 2024-01-01 RX ADMIN — ACETAMINOPHEN 650 MG: 325 TABLET ORAL at 11:30

## 2024-01-01 RX ADMIN — MIDAZOLAM 1 MG: 1 INJECTION INTRAMUSCULAR; INTRAVENOUS at 17:35

## 2024-01-01 RX ADMIN — SODIUM CHLORIDE 50 ML/HR: 9 INJECTION, SOLUTION INTRAVENOUS at 02:00

## 2024-01-01 RX ADMIN — ACETAMINOPHEN 650 MG: 325 TABLET ORAL at 15:04

## 2024-01-01 RX ADMIN — ENOXAPARIN SODIUM 30 MG: 30 INJECTION SUBCUTANEOUS at 13:57

## 2024-01-01 RX ADMIN — ENOXAPARIN SODIUM 30 MG: 30 INJECTION SUBCUTANEOUS at 14:12

## 2024-01-01 RX ADMIN — DOCUSATE SODIUM 100 MG: 100 CAPSULE, LIQUID FILLED ORAL at 20:33

## 2024-01-01 RX ADMIN — DEXTROSE AND SODIUM CHLORIDE 50 ML/HR: 5; 450 INJECTION, SOLUTION INTRAVENOUS at 17:16

## 2024-01-01 RX ADMIN — DOCUSATE SODIUM 100 MG: 100 CAPSULE, LIQUID FILLED ORAL at 09:00

## 2024-01-01 RX ADMIN — ONDANSETRON 4 MG: 2 INJECTION INTRAMUSCULAR; INTRAVENOUS at 15:54

## 2024-01-01 RX ADMIN — DOCUSATE SODIUM 100 MG: 100 CAPSULE, LIQUID FILLED ORAL at 08:51

## 2024-01-01 RX ADMIN — INSULIN LISPRO 2 UNITS: 100 INJECTION, SOLUTION INTRAVENOUS; SUBCUTANEOUS at 14:15

## 2024-01-01 RX ADMIN — METOPROLOL SUCCINATE 25 MG: 25 TABLET, EXTENDED RELEASE ORAL at 08:52

## 2024-01-01 RX ADMIN — LEVETIRACETAM 500 MG: 500 TABLET, FILM COATED ORAL at 09:52

## 2024-01-01 RX ADMIN — FAMOTIDINE 20 MG: 10 INJECTION, SOLUTION INTRAVENOUS at 15:54

## 2024-01-01 RX ADMIN — OLANZAPINE 2.5 MG: 10 INJECTION, POWDER, FOR SOLUTION INTRAMUSCULAR at 23:13

## 2024-01-01 RX ADMIN — HYDROMORPHONE HYDROCHLORIDE 0.5 MG: 0.5 INJECTION, SOLUTION INTRAMUSCULAR; INTRAVENOUS; SUBCUTANEOUS at 02:17

## 2024-01-01 RX ADMIN — FENTANYL CITRATE 25 MCG: 50 INJECTION INTRAMUSCULAR; INTRAVENOUS at 18:05

## 2024-01-01 RX ADMIN — HYDROMORPHONE HYDROCHLORIDE 0.5 MG: 0.5 INJECTION, SOLUTION INTRAMUSCULAR; INTRAVENOUS; SUBCUTANEOUS at 05:29

## 2024-01-01 RX ADMIN — DEXAMETHASONE SODIUM PHOSPHATE 8 MG: 4 INJECTION INTRA-ARTICULAR; INTRALESIONAL; INTRAMUSCULAR; INTRAVENOUS; SOFT TISSUE at 17:42

## 2024-01-01 RX ADMIN — HALOPERIDOL LACTATE 5 MG: 5 INJECTION, SOLUTION INTRAMUSCULAR at 17:07

## 2024-01-01 RX ADMIN — ACETAMINOPHEN 650 MG: 325 TABLET ORAL at 14:26

## 2024-01-01 RX ADMIN — METOPROLOL SUCCINATE 25 MG: 25 TABLET, EXTENDED RELEASE ORAL at 09:05

## 2024-01-01 RX ADMIN — METOPROLOL SUCCINATE 25 MG: 25 TABLET, EXTENDED RELEASE ORAL at 09:00

## 2024-01-01 RX ADMIN — PERFLUTREN 2 ML OF DILUTION: 6.52 INJECTION, SUSPENSION INTRAVENOUS at 13:33

## 2024-01-01 RX ADMIN — PROPOFOL 5 MCG/KG/MIN: 10 INJECTION, EMULSION INTRAVENOUS at 02:00

## 2024-01-01 RX ADMIN — ONDANSETRON HYDROCHLORIDE 4 MG: 2 INJECTION, SOLUTION INTRAVENOUS at 02:34

## 2024-01-01 RX ADMIN — Medication: at 11:34

## 2024-01-01 RX ADMIN — POLYETHYLENE GLYCOL 3350 17 G: 17 POWDER, FOR SOLUTION ORAL at 09:30

## 2024-01-01 RX ADMIN — LEVETIRACETAM 500 MG: 500 TABLET, FILM COATED ORAL at 12:51

## 2024-01-01 RX ADMIN — CEFAZOLIN SODIUM 2 G: 2 INJECTION, SOLUTION INTRAVENOUS at 17:27

## 2024-01-01 RX ADMIN — INSULIN LISPRO 2 UNITS: 100 INJECTION, SOLUTION INTRAVENOUS; SUBCUTANEOUS at 17:13

## 2024-01-01 RX ADMIN — DOCUSATE SODIUM 100 MG: 100 CAPSULE, LIQUID FILLED ORAL at 20:54

## 2024-01-01 RX ADMIN — HYDROCHLOROTHIAZIDE: 25 TABLET ORAL at 08:36

## 2024-01-01 RX ADMIN — ACETAMINOPHEN 650 MG: 325 TABLET ORAL at 21:45

## 2024-01-01 RX ADMIN — TRANEXAMIC ACID 1000 MG: 10 INJECTION, SOLUTION INTRAVENOUS at 19:42

## 2024-01-01 RX ADMIN — PROPOFOL 200 MG: 10 INJECTION, EMULSION INTRAVENOUS at 17:39

## 2024-01-01 RX ADMIN — DOCUSATE SODIUM 100 MG: 100 CAPSULE, LIQUID FILLED ORAL at 09:29

## 2024-01-01 RX ADMIN — EPINEPHRINE 1 MG: 0.1 INJECTION INTRAVENOUS at 01:23

## 2024-01-01 RX ADMIN — ONDANSETRON 4 MG: 2 INJECTION INTRAMUSCULAR; INTRAVENOUS at 02:34

## 2024-01-01 RX ADMIN — MORPHINE SULFATE 4 MG: 4 INJECTION, SOLUTION INTRAMUSCULAR; INTRAVENOUS at 20:58

## 2024-01-01 RX ADMIN — SODIUM CHLORIDE 50 ML/HR: 9 INJECTION, SOLUTION INTRAVENOUS at 15:55

## 2024-01-01 RX ADMIN — LEVETIRACETAM 500 MG: 500 TABLET, FILM COATED ORAL at 23:00

## 2024-01-01 RX ADMIN — SODIUM CITRATE AND CITRIC ACID MONOHYDRATE 30 ML: 500; 334 SOLUTION ORAL at 15:54

## 2024-01-01 RX ADMIN — MAGNESIUM SULFATE HEPTAHYDRATE 2 G: 40 INJECTION, SOLUTION INTRAVENOUS at 12:58

## 2024-01-01 RX ADMIN — FENTANYL CITRATE 25 MCG: 50 INJECTION INTRAMUSCULAR; INTRAVENOUS at 18:33

## 2024-01-01 RX ADMIN — IRON SUCROSE 200 MG: 20 INJECTION, SOLUTION INTRAVENOUS at 16:50

## 2024-01-01 RX ADMIN — FENTANYL CITRATE 50 MCG: 50 INJECTION INTRAMUSCULAR; INTRAVENOUS at 17:39

## 2024-01-01 SDOH — SOCIAL STABILITY: SOCIAL INSECURITY: ARE YOU OR HAVE YOU BEEN THREATENED OR ABUSED PHYSICALLY, EMOTIONALLY, OR SEXUALLY BY ANYONE?: NO

## 2024-01-01 SDOH — HEALTH STABILITY: MENTAL HEALTH: CURRENT SMOKER: 0

## 2024-01-01 SDOH — SOCIAL STABILITY: SOCIAL INSECURITY: ABUSE: ADULT

## 2024-01-01 SDOH — SOCIAL STABILITY: SOCIAL INSECURITY: DOES ANYONE TRY TO KEEP YOU FROM HAVING/CONTACTING OTHER FRIENDS OR DOING THINGS OUTSIDE YOUR HOME?: NO

## 2024-01-01 SDOH — SOCIAL STABILITY: SOCIAL INSECURITY: WERE YOU ABLE TO COMPLETE ALL THE BEHAVIORAL HEALTH SCREENINGS?: YES

## 2024-01-01 SDOH — SOCIAL STABILITY: SOCIAL INSECURITY: HAS ANYONE EVER THREATENED TO HURT YOUR FAMILY OR YOUR PETS?: NO

## 2024-01-01 SDOH — SOCIAL STABILITY: SOCIAL INSECURITY: ARE THERE ANY APPARENT SIGNS OF INJURIES/BEHAVIORS THAT COULD BE RELATED TO ABUSE/NEGLECT?: NO

## 2024-01-01 SDOH — SOCIAL STABILITY: SOCIAL INSECURITY: DO YOU FEEL UNSAFE GOING BACK TO THE PLACE WHERE YOU ARE LIVING?: NO

## 2024-01-01 SDOH — SOCIAL STABILITY: SOCIAL INSECURITY: DO YOU FEEL ANYONE HAS EXPLOITED OR TAKEN ADVANTAGE OF YOU FINANCIALLY OR OF YOUR PERSONAL PROPERTY?: NO

## 2024-01-01 SDOH — SOCIAL STABILITY: SOCIAL INSECURITY: HAVE YOU HAD THOUGHTS OF HARMING ANYONE ELSE?: NO

## 2024-01-01 ASSESSMENT — COGNITIVE AND FUNCTIONAL STATUS - GENERAL
TURNING FROM BACK TO SIDE WHILE IN FLAT BAD: A LITTLE
DRESSING REGULAR LOWER BODY CLOTHING: A LOT
TOILETING: A LOT
TOILETING: TOTAL
TURNING FROM BACK TO SIDE WHILE IN FLAT BAD: TOTAL
TOILETING: A LOT
PERSONAL GROOMING: A LITTLE
DRESSING REGULAR UPPER BODY CLOTHING: A LOT
MOVING TO AND FROM BED TO CHAIR: A LOT
PERSONAL GROOMING: TOTAL
DAILY ACTIVITIY SCORE: 14
PERSONAL GROOMING: A LITTLE
MOVING TO AND FROM BED TO CHAIR: A LITTLE
WALKING IN HOSPITAL ROOM: A LOT
MOBILITY SCORE: 6
STANDING UP FROM CHAIR USING ARMS: A LOT
PERSONAL GROOMING: A LITTLE
TOILETING: A LITTLE
MOVING FROM LYING ON BACK TO SITTING ON SIDE OF FLAT BED WITH BEDRAILS: A LOT
HELP NEEDED FOR BATHING: A LOT
CLIMB 3 TO 5 STEPS WITH RAILING: TOTAL
MOVING TO AND FROM BED TO CHAIR: A LOT
CLIMB 3 TO 5 STEPS WITH RAILING: TOTAL
MOVING FROM LYING ON BACK TO SITTING ON SIDE OF FLAT BED WITH BEDRAILS: A LITTLE
DRESSING REGULAR UPPER BODY CLOTHING: A LOT
MOVING TO AND FROM BED TO CHAIR: TOTAL
MOBILITY SCORE: 13
WALKING IN HOSPITAL ROOM: TOTAL
MOVING FROM LYING ON BACK TO SITTING ON SIDE OF FLAT BED WITH BEDRAILS: A LITTLE
STANDING UP FROM CHAIR USING ARMS: TOTAL
TOILETING: A LOT
DRESSING REGULAR UPPER BODY CLOTHING: A LOT
DRESSING REGULAR UPPER BODY CLOTHING: A LOT
MOVING TO AND FROM BED TO CHAIR: A LITTLE
DAILY ACTIVITIY SCORE: 13
MOVING FROM LYING ON BACK TO SITTING ON SIDE OF FLAT BED WITH BEDRAILS: A LITTLE
WALKING IN HOSPITAL ROOM: TOTAL
TURNING FROM BACK TO SIDE WHILE IN FLAT BAD: A LOT
MOBILITY SCORE: 13
CLIMB 3 TO 5 STEPS WITH RAILING: TOTAL
DAILY ACTIVITIY SCORE: 12
DRESSING REGULAR LOWER BODY CLOTHING: A LOT
DRESSING REGULAR UPPER BODY CLOTHING: A LOT
STANDING UP FROM CHAIR USING ARMS: A LOT
TOILETING: TOTAL
MOVING TO AND FROM BED TO CHAIR: A LOT
EATING MEALS: A LITTLE
DRESSING REGULAR UPPER BODY CLOTHING: A LOT
PERSONAL GROOMING: A LITTLE
MOVING FROM LYING ON BACK TO SITTING ON SIDE OF FLAT BED WITH BEDRAILS: TOTAL
WALKING IN HOSPITAL ROOM: A LOT
DRESSING REGULAR UPPER BODY CLOTHING: A LITTLE
WALKING IN HOSPITAL ROOM: TOTAL
MOBILITY SCORE: 11
DAILY ACTIVITIY SCORE: 12
STANDING UP FROM CHAIR USING ARMS: A LOT
DAILY ACTIVITIY SCORE: 14
EATING MEALS: A LITTLE
CLIMB 3 TO 5 STEPS WITH RAILING: TOTAL
DAILY ACTIVITIY SCORE: 15
EATING MEALS: A LITTLE
TOILETING: A LITTLE
MOBILITY SCORE: 11
CLIMB 3 TO 5 STEPS WITH RAILING: TOTAL
MOVING TO AND FROM BED TO CHAIR: A LITTLE
TURNING FROM BACK TO SIDE WHILE IN FLAT BAD: A LOT
EATING MEALS: A LITTLE
DAILY ACTIVITIY SCORE: 20
MOVING FROM LYING ON BACK TO SITTING ON SIDE OF FLAT BED WITH BEDRAILS: TOTAL
TURNING FROM BACK TO SIDE WHILE IN FLAT BAD: TOTAL
DRESSING REGULAR LOWER BODY CLOTHING: A LOT
WALKING IN HOSPITAL ROOM: TOTAL
DRESSING REGULAR UPPER BODY CLOTHING: A LOT
MOBILITY SCORE: 13
CLIMB 3 TO 5 STEPS WITH RAILING: TOTAL
HELP NEEDED FOR BATHING: TOTAL
TOILETING: A LITTLE
WALKING IN HOSPITAL ROOM: A LOT
MOVING FROM LYING ON BACK TO SITTING ON SIDE OF FLAT BED WITH BEDRAILS: TOTAL
MOBILITY SCORE: 12
STANDING UP FROM CHAIR USING ARMS: TOTAL
DRESSING REGULAR LOWER BODY CLOTHING: A LITTLE
WALKING IN HOSPITAL ROOM: TOTAL
STANDING UP FROM CHAIR USING ARMS: A LOT
STANDING UP FROM CHAIR USING ARMS: A LOT
DRESSING REGULAR LOWER BODY CLOTHING: A LITTLE
STANDING UP FROM CHAIR USING ARMS: A LOT
WALKING IN HOSPITAL ROOM: A LOT
MOVING FROM LYING ON BACK TO SITTING ON SIDE OF FLAT BED WITH BEDRAILS: A LOT
MOVING FROM LYING ON BACK TO SITTING ON SIDE OF FLAT BED WITH BEDRAILS: A LITTLE
MOVING TO AND FROM BED TO CHAIR: A LOT
TURNING FROM BACK TO SIDE WHILE IN FLAT BAD: TOTAL
HELP NEEDED FOR BATHING: A LOT
MOVING FROM LYING ON BACK TO SITTING ON SIDE OF FLAT BED WITH BEDRAILS: A LOT
TOILETING: A LOT
CLIMB 3 TO 5 STEPS WITH RAILING: TOTAL
DRESSING REGULAR UPPER BODY CLOTHING: A LOT
MOBILITY SCORE: 11
CLIMB 3 TO 5 STEPS WITH RAILING: TOTAL
TURNING FROM BACK TO SIDE WHILE IN FLAT BAD: A LOT
DAILY ACTIVITIY SCORE: 20
CLIMB 3 TO 5 STEPS WITH RAILING: TOTAL
MOVING TO AND FROM BED TO CHAIR: TOTAL
TOILETING: A LOT
HELP NEEDED FOR BATHING: A LOT
EATING MEALS: A LITTLE
TURNING FROM BACK TO SIDE WHILE IN FLAT BAD: A LITTLE
PERSONAL GROOMING: A LITTLE
DRESSING REGULAR UPPER BODY CLOTHING: A LOT
DAILY ACTIVITIY SCORE: 14
STANDING UP FROM CHAIR USING ARMS: A LOT
DRESSING REGULAR LOWER BODY CLOTHING: A LOT
MOVING FROM LYING ON BACK TO SITTING ON SIDE OF FLAT BED WITH BEDRAILS: A LITTLE
DRESSING REGULAR UPPER BODY CLOTHING: A LITTLE
WALKING IN HOSPITAL ROOM: A LOT
TOILETING: A LOT
WALKING IN HOSPITAL ROOM: TOTAL
TURNING FROM BACK TO SIDE WHILE IN FLAT BAD: A LOT
MOVING TO AND FROM BED TO CHAIR: A LOT
DAILY ACTIVITIY SCORE: 20
STANDING UP FROM CHAIR USING ARMS: A LOT
WALKING IN HOSPITAL ROOM: A LOT
HELP NEEDED FOR BATHING: A LITTLE
DAILY ACTIVITIY SCORE: 14
HELP NEEDED FOR BATHING: A LOT
MOVING TO AND FROM BED TO CHAIR: A LOT
PERSONAL GROOMING: A LOT
MOBILITY SCORE: 11
HELP NEEDED FOR BATHING: A LOT
TURNING FROM BACK TO SIDE WHILE IN FLAT BAD: A LOT
MOVING TO AND FROM BED TO CHAIR: A LOT
STANDING UP FROM CHAIR USING ARMS: TOTAL
CLIMB 3 TO 5 STEPS WITH RAILING: TOTAL
HELP NEEDED FOR BATHING: A LOT
MOBILITY SCORE: 10
MOVING TO AND FROM BED TO CHAIR: TOTAL
HELP NEEDED FOR BATHING: A LITTLE
DRESSING REGULAR LOWER BODY CLOTHING: A LOT
DRESSING REGULAR LOWER BODY CLOTHING: A LOT
DRESSING REGULAR UPPER BODY CLOTHING: TOTAL
MOBILITY SCORE: 12
PERSONAL GROOMING: A LOT
WALKING IN HOSPITAL ROOM: TOTAL
MOBILITY SCORE: 7
HELP NEEDED FOR BATHING: A LOT
TOILETING: A LOT
TURNING FROM BACK TO SIDE WHILE IN FLAT BAD: TOTAL
EATING MEALS: A LITTLE
STANDING UP FROM CHAIR USING ARMS: A LOT
CLIMB 3 TO 5 STEPS WITH RAILING: TOTAL
DRESSING REGULAR LOWER BODY CLOTHING: A LOT
DAILY ACTIVITIY SCORE: 6
TURNING FROM BACK TO SIDE WHILE IN FLAT BAD: A LOT
CLIMB 3 TO 5 STEPS WITH RAILING: TOTAL
PERSONAL GROOMING: A LITTLE
STANDING UP FROM CHAIR USING ARMS: A LOT
HELP NEEDED FOR BATHING: A LOT
MOBILITY SCORE: 6
PERSONAL GROOMING: A LOT
CLIMB 3 TO 5 STEPS WITH RAILING: TOTAL
DRESSING REGULAR UPPER BODY CLOTHING: A LITTLE
MOVING FROM LYING ON BACK TO SITTING ON SIDE OF FLAT BED WITH BEDRAILS: A LOT
MOBILITY SCORE: 6
DRESSING REGULAR LOWER BODY CLOTHING: A LITTLE
MOVING FROM LYING ON BACK TO SITTING ON SIDE OF FLAT BED WITH BEDRAILS: A LOT
CLIMB 3 TO 5 STEPS WITH RAILING: TOTAL
HELP NEEDED FOR BATHING: A LITTLE
MOVING FROM LYING ON BACK TO SITTING ON SIDE OF FLAT BED WITH BEDRAILS: A LOT
HELP NEEDED FOR BATHING: A LOT
MOVING TO AND FROM BED TO CHAIR: TOTAL
DAILY ACTIVITIY SCORE: 15
EATING MEALS: TOTAL
PATIENT BASELINE BEDBOUND: NO
STANDING UP FROM CHAIR USING ARMS: TOTAL
TURNING FROM BACK TO SIDE WHILE IN FLAT BAD: A LOT
DRESSING REGULAR LOWER BODY CLOTHING: A LOT
EATING MEALS: A LITTLE
DRESSING REGULAR LOWER BODY CLOTHING: TOTAL
WALKING IN HOSPITAL ROOM: TOTAL
TOILETING: TOTAL
TURNING FROM BACK TO SIDE WHILE IN FLAT BAD: A LITTLE
DRESSING REGULAR LOWER BODY CLOTHING: A LOT

## 2024-01-01 ASSESSMENT — PAIN - FUNCTIONAL ASSESSMENT
PAIN_FUNCTIONAL_ASSESSMENT: 0-10
PAIN_FUNCTIONAL_ASSESSMENT: CPOT (CRITICAL CARE PAIN OBSERVATION TOOL)
PAIN_FUNCTIONAL_ASSESSMENT: 0-10

## 2024-01-01 ASSESSMENT — PAIN DESCRIPTION - LOCATION
LOCATION: RIB CAGE
LOCATION: HIP
LOCATION: CHEST
LOCATION: HEAD
LOCATION: HIP
LOCATION: HIP

## 2024-01-01 ASSESSMENT — PAIN SCALES - GENERAL
PAINLEVEL_OUTOF10: 0 - NO PAIN
PAINLEVEL_OUTOF10: 4
PAINLEVEL_OUTOF10: 0 - NO PAIN
PAINLEVEL_OUTOF10: 9
PAINLEVEL_OUTOF10: 0 - NO PAIN
PAINLEVEL_OUTOF10: 10 - WORST POSSIBLE PAIN
PAINLEVEL_OUTOF10: 0 - NO PAIN
PAINLEVEL_OUTOF10: 9
PAINLEVEL_OUTOF10: 0 - NO PAIN
PAINLEVEL_OUTOF10: 8
PAINLEVEL_OUTOF10: 0 - NO PAIN
PAINLEVEL_OUTOF10: 7
PAINLEVEL_OUTOF10: 0 - NO PAIN
PAINLEVEL_OUTOF10: 10 - WORST POSSIBLE PAIN
PAINLEVEL_OUTOF10: 0 - NO PAIN
PAINLEVEL_OUTOF10: 3
PAINLEVEL_OUTOF10: 0 - NO PAIN
PAINLEVEL_OUTOF10: 2
PAINLEVEL_OUTOF10: 8
PAINLEVEL_OUTOF10: 6
PAINLEVEL_OUTOF10: 7
PAINLEVEL_OUTOF10: 0 - NO PAIN
PAINLEVEL_OUTOF10: 8
PAINLEVEL_OUTOF10: 3
PAINLEVEL_OUTOF10: 0 - NO PAIN
PAINLEVEL_OUTOF10: 8
PAINLEVEL_OUTOF10: 3
PAINLEVEL_OUTOF10: 0 - NO PAIN
PAIN_LEVEL: 0
PAINLEVEL_OUTOF10: 2
PAINLEVEL_OUTOF10: 0 - NO PAIN

## 2024-01-01 ASSESSMENT — ACTIVITIES OF DAILY LIVING (ADL)
BATHING_ASSISTANCE: MAXIMAL
DRESSING YOURSELF: INDEPENDENT
HEARING - RIGHT EAR: FUNCTIONAL
BATHING: INDEPENDENT
PATIENT'S MEMORY ADEQUATE TO SAFELY COMPLETE DAILY ACTIVITIES?: YES
GROOMING: INDEPENDENT
HOME_MANAGEMENT_TIME_ENTRY: 38
JUDGMENT_ADEQUATE_SAFELY_COMPLETE_DAILY_ACTIVITIES: YES
TOILETING: INDEPENDENT
HEARING - LEFT EAR: FUNCTIONAL
ADEQUATE_TO_COMPLETE_ADL: YES
LACK_OF_TRANSPORTATION: NO
WALKS IN HOME: INDEPENDENT
FEEDING YOURSELF: INDEPENDENT

## 2024-01-01 ASSESSMENT — ENCOUNTER SYMPTOMS
CARDIOVASCULAR NEGATIVE: 1
NEUROLOGICAL NEGATIVE: 1
ALLERGIC/IMMUNOLOGIC NEGATIVE: 1
NERVOUS/ANXIOUS: 1
CONSTITUTIONAL NEGATIVE: 1
GASTROINTESTINAL NEGATIVE: 1
HEMATOLOGIC/LYMPHATIC NEGATIVE: 1
CONSTITUTIONAL NEGATIVE: 1
NEUROLOGICAL NEGATIVE: 1
ENDOCRINE COMMENTS: PT IS DIABETIC
GASTROINTESTINAL NEGATIVE: 1
ENDOCRINE NEGATIVE: 1
PSYCHIATRIC NEGATIVE: 1
RESPIRATORY NEGATIVE: 1
ALLERGIC/IMMUNOLOGIC NEGATIVE: 1
HEMATOLOGIC/LYMPHATIC NEGATIVE: 1
CARDIOVASCULAR NEGATIVE: 1
EYES NEGATIVE: 1

## 2024-01-01 ASSESSMENT — LIFESTYLE VARIABLES
HOW OFTEN DO YOU HAVE 6 OR MORE DRINKS ON ONE OCCASION: NEVER
PRESCIPTION_ABUSE_PAST_12_MONTHS: NO
AUDIT-C TOTAL SCORE: 0
SKIP TO QUESTIONS 9-10: 1
EVER HAD A DRINK FIRST THING IN THE MORNING TO STEADY YOUR NERVES TO GET RID OF A HANGOVER: NO
AUDIT-C TOTAL SCORE: 0
HOW MANY STANDARD DRINKS CONTAINING ALCOHOL DO YOU HAVE ON A TYPICAL DAY: PATIENT DOES NOT DRINK
HOW OFTEN DO YOU HAVE A DRINK CONTAINING ALCOHOL: NEVER
HAVE YOU EVER FELT YOU SHOULD CUT DOWN ON YOUR DRINKING: NO
SUBSTANCE_ABUSE_PAST_12_MONTHS: NO
EVER FELT BAD OR GUILTY ABOUT YOUR DRINKING: NO
HAVE PEOPLE ANNOYED YOU BY CRITICIZING YOUR DRINKING: NO

## 2024-01-01 ASSESSMENT — COLUMBIA-SUICIDE SEVERITY RATING SCALE - C-SSRS
1. IN THE PAST MONTH, HAVE YOU WISHED YOU WERE DEAD OR WISHED YOU COULD GO TO SLEEP AND NOT WAKE UP?: NO
1. IN THE PAST MONTH, HAVE YOU WISHED YOU WERE DEAD OR WISHED YOU COULD GO TO SLEEP AND NOT WAKE UP?: NO
2. HAVE YOU ACTUALLY HAD ANY THOUGHTS OF KILLING YOURSELF?: NO
2. HAVE YOU ACTUALLY HAD ANY THOUGHTS OF KILLING YOURSELF?: NO
6. HAVE YOU EVER DONE ANYTHING, STARTED TO DO ANYTHING, OR PREPARED TO DO ANYTHING TO END YOUR LIFE?: NO
6. HAVE YOU EVER DONE ANYTHING, STARTED TO DO ANYTHING, OR PREPARED TO DO ANYTHING TO END YOUR LIFE?: NO

## 2024-01-01 ASSESSMENT — PATIENT HEALTH QUESTIONNAIRE - PHQ9
1. LITTLE INTEREST OR PLEASURE IN DOING THINGS: NOT AT ALL
SUM OF ALL RESPONSES TO PHQ9 QUESTIONS 1 & 2: 0
2. FEELING DOWN, DEPRESSED OR HOPELESS: NOT AT ALL

## 2024-01-01 ASSESSMENT — PAIN DESCRIPTION - ORIENTATION
ORIENTATION: RIGHT

## 2024-01-01 ASSESSMENT — PAIN DESCRIPTION - PAIN TYPE: TYPE: ACUTE PAIN

## 2024-01-29 NOTE — TELEPHONE ENCOUNTER
Former [facility] patient now seeing you. Needs a refill on her Metoprolol 25 mg takes it 1 time a day please send to [facility]

## 2024-02-07 NOTE — TELEPHONE ENCOUNTER
Rx Refill Request Telephone Encounter    Name:  Kathy JAUREGUI Elyse  :  [id number]  Medication Name:  Metoprolol Succinate XL  25 mg          Specific Pharmacy location:  [facility]

## 2024-02-15 PROBLEM — E78.5 DM TYPE 2 WITH DIABETIC DYSLIPIDEMIA (MULTI): Chronic | Status: ACTIVE | Noted: 2023-01-01

## 2024-02-15 PROBLEM — E11.9 DIABETES (MULTI): Chronic | Status: ACTIVE | Noted: 2023-01-01

## 2024-02-15 PROBLEM — I10 HYPERTENSION: Chronic | Status: ACTIVE | Noted: 2023-01-01

## 2024-02-15 PROBLEM — M81.0 OSTEOPOROSIS: Chronic | Status: ACTIVE | Noted: 2023-01-01

## 2024-02-15 PROBLEM — E11.69 DM TYPE 2 WITH DIABETIC DYSLIPIDEMIA (MULTI): Status: ACTIVE | Noted: 2023-01-01

## 2024-02-15 PROBLEM — E11.69 DM TYPE 2 WITH DIABETIC DYSLIPIDEMIA (MULTI): Chronic | Status: ACTIVE | Noted: 2023-01-01

## 2024-02-15 NOTE — ASSESSMENT & PLAN NOTE
No bone density on record  She is chronically managed with boniva, no SE or concerns  Recommend adding calcium and Vit D supplement  Would begin working on weight bearing activity for balance and strength as well as removing any objects in the home that add to fall risk.

## 2024-02-15 NOTE — ASSESSMENT & PLAN NOTE
A1c of 6.2, well controlled with metformin 850mg BID  -Encourage frequent SMBG  -If using insulin aware to switch insertion sites  -Encourage regular follow up with opthomology  -Encourage regular follow up with podiatry   -Hypoglycemia, insulin education     Diabetes Preventative Measures:   Lipid: LDL goal of less than 70, on simvastatin  BP: on ARB- diovan
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
## 2024-02-18 PROBLEM — S72.001A: Status: ACTIVE | Noted: 2024-01-01

## 2024-02-18 NOTE — CARE PLAN
The patient's goals for the shift include pain control    The clinical goals for the shift include pain control    Problem: Discharge Planning  Goal: Discharge to home or other facility with appropriate resources  Outcome: Progressing     Problem: Chronic Conditions and Co-morbidities  Goal: Patient's chronic conditions and co-morbidity symptoms are monitored and maintained or improved  Outcome: Progressing     Problem: Fall/Injury  Goal: Not fall by end of shift  Outcome: Progressing  Goal: Be free from injury by end of the shift  Outcome: Progressing  Goal: Verbalize understanding of personal risk factors for fall in the hospital  Outcome: Progressing  Goal: Verbalize understanding of risk factor reduction measures to prevent injury from fall in the home  Outcome: Progressing  Goal: Use assistive devices by end of the shift  Outcome: Progressing  Goal: Pace activities to prevent fatigue by end of the shift  Outcome: Progressing     Problem: Pain  Goal: Takes deep breaths with improved pain control throughout the shift  Outcome: Progressing  Goal: Turns in bed with improved pain control throughout the shift  Outcome: Progressing  Goal: Walks with improved pain control throughout the shift  Outcome: Progressing  Goal: Performs ADL's with improved pain control throughout shift  Outcome: Progressing  Goal: Participates in PT with improved pain control throughout the shift  Outcome: Progressing  Goal: Free from opioid side effects throughout the shift  Outcome: Progressing  Goal: Free from acute confusion related to pain meds throughout the shift  Outcome: Progressing     Problem: Diabetes  Goal: Achieve decreasing blood glucose levels by end of shift  Outcome: Progressing  Goal: Increase stability of blood glucose readings by end of shift  Outcome: Progressing  Goal: Decrease in ketones present in urine by end of shift  Outcome: Progressing  Goal: Maintain electrolyte levels within acceptable range throughout  shift  Outcome: Progressing  Goal: Maintain glucose levels >70mg/dl to <250mg/dl throughout shift  Outcome: Progressing  Goal: No changes in neurological exam by end of shift  Outcome: Progressing  Goal: Learn about and adhere to nutrition recommendations by end of shift  Outcome: Progressing  Goal: Vital signs within normal range for age by end of shift  Outcome: Progressing  Goal: Increase self care and/or family involovement by end of shift  Outcome: Progressing  Goal: Receive DSME education by end of shift  Outcome: Progressing

## 2024-02-18 NOTE — PROGRESS NOTES
I have reviewed Dr. Ho's assessment and plan as documented in his 02/18/24 H/P.      Keshav Hubbard MD PhD

## 2024-02-18 NOTE — H&P
History Of Present Illness  Kathy Pappas is an 83 y.o. female with history of diabetes, hypertension, and hyperlipidemia presenting with a fall and right hip pain.  She states that she was coming downstairs tonight when she slipped on the penultimate step and fell on her right side. She bruised both knees and hit forehead on the floor.  She was brought to the ED by EMS and hip imaging study yielded a comminuted right basicervical/intertrochanteric femoral fracture. Pt denies having any antecedent or associated lightheadedness, chest pain, palpitations or SOB. There was no LOC. No HA, visual changes, nausea or vomiting. She reports being fairly active and is able to do >4METS. She denies history of structural heart disease including MI.    Past Medical History  Type 2 diabetes mellitus  Hyperlipidemia  Hypertension  Osteoporosis  Chronic kidney disease  Allergic rhinitis  Osteoarthritis  Anxiety    Past Surgical History  Denies     Social History  She reports that she has never smoked. She has never used smokeless tobacco. She reports that she does not drink alcohol and does not use drugs.  She is  and lives with her daughter. She had 2 children and one passed away.    Family History  Significant for MI in father, and cervical cancer and VTE in mother     Allergies  Patient has no known allergies.    Review of Systems   Constitutional: Negative.    HENT: Negative.     Eyes: Negative.    Respiratory: Negative.     Cardiovascular: Negative.    Gastrointestinal: Negative.    Endocrine:        Pt is diabetic    Genitourinary: Negative.    Musculoskeletal:         See HPI   Skin: Negative.    Allergic/Immunologic: Negative.    Neurological: Negative.    Hematological: Negative.    Psychiatric/Behavioral: Negative.          Physical Exam  Constitutional:       General: She is not in acute distress.     Appearance: Normal appearance. She is not ill-appearing, toxic-appearing or diaphoretic.   HENT:      Head:  "Normocephalic and atraumatic.      Nose: Nose normal.      Mouth/Throat:      Mouth: Mucous membranes are dry.      Pharynx: Oropharynx is clear. No oropharyngeal exudate or posterior oropharyngeal erythema.   Eyes:      General: No scleral icterus.        Right eye: No discharge.         Left eye: No discharge.      Conjunctiva/sclera: Conjunctivae normal.   Cardiovascular:      Rate and Rhythm: Normal rate and regular rhythm.      Heart sounds: Normal heart sounds. No murmur heard.  Pulmonary:      Breath sounds: Normal breath sounds. No wheezing, rhonchi or rales.   Abdominal:      Palpations: Abdomen is soft. There is no mass.      Tenderness: There is no abdominal tenderness. There is no right CVA tenderness, guarding or rebound.   Musculoskeletal:      Cervical back: Neck supple.      Right lower leg: No edema.      Left lower leg: No edema.      Comments: RLE is shortened and externally rotated   Lymphadenopathy:      Cervical: No cervical adenopathy.   Skin:     General: Skin is warm and dry.      Capillary Refill: Capillary refill takes less than 2 seconds.      Findings: Bruising present.      Comments: Bruises seen on both knees   Neurological:      General: No focal deficit present.      Mental Status: She is alert and oriented to person, place, and time.   Psychiatric:         Mood and Affect: Mood normal.         Behavior: Behavior normal.          Last Recorded Vitals  Blood pressure 142/64, pulse 74, temperature 36 °C (96.8 °F), temperature source Temporal, resp. rate 18, height 1.6 m (5' 3\"), weight 61.6 kg (135 lb 12.9 oz), SpO2 97 %.    Relevant Results   Latest Reference Range & Units 02/17/24 20:57   GLUCOSE 74 - 99 mg/dL 181 (H)   SODIUM 136 - 145 mmol/L 138   POTASSIUM 3.5 - 5.3 mmol/L 4.1   CHLORIDE 98 - 107 mmol/L 102   Bicarbonate 21 - 32 mmol/L 26   Anion Gap 10 - 20 mmol/L 14   Blood Urea Nitrogen 6 - 23 mg/dL 34 (H)   Creatinine 0.50 - 1.05 mg/dL 1.76 (H)   EGFR >60 mL/min/1.73m*2 29 (L) "   Calcium 8.6 - 10.3 mg/dL 8.6   Albumin 3.4 - 5.0 g/dL 3.9   Alkaline Phosphatase 33 - 136 U/L 65   ALT 7 - 45 U/L 5 (L)   AST 9 - 39 U/L 12   Bilirubin Total 0.0 - 1.2 mg/dL 0.6   Total Protein 6.4 - 8.2 g/dL 6.6   MAGNESIUM 1.60 - 2.40 mg/dL 0.64 (L)   LIPASE 9 - 82 U/L 34   WBC 4.4 - 11.3 x10*3/uL 10.9   nRBC 0.0 - 0.0 /100 WBCs 0.0   RBC 4.00 - 5.20 x10*6/uL 3.08 (L)   HEMOGLOBIN 12.0 - 16.0 g/dL 9.6 (L)   HEMATOCRIT 36.0 - 46.0 % 29.6 (L)   MCV 80 - 100 fL 96   MCH 26.0 - 34.0 pg 31.2   MCHC 32.0 - 36.0 g/dL 32.4   RED CELL DISTRIBUTION WIDTH 11.5 - 14.5 % 12.6   Platelets 150 - 450 x10*3/uL 236   Neutrophils % 40.0 - 80.0 % 80.9   Immature Granulocytes %, Automated 0.0 - 0.9 % 0.5   Lymphocytes % 13.0 - 44.0 % 11.8   Monocytes % 2.0 - 10.0 % 4.5   Eosinophils % 0.0 - 6.0 % 1.9   Basophils % 0.0 - 2.0 % 0.4   Neutrophils Absolute 1.60 - 5.50 x10*3/uL 8.80 (H)   Immature Granulocytes Absolute, Automated 0.00 - 0.50 x10*3/uL 0.05   Lymphocytes Absolute 0.80 - 3.00 x10*3/uL 1.28   Monocytes Absolute 0.05 - 0.80 x10*3/uL 0.49   Eosinophils Absolute 0.00 - 0.40 x10*3/uL 0.21   Basophils Absolute 0.00 - 0.10 x10*3/uL 0.04   (H): Data is abnormally high  (L): Data is abnormally low    XR RIGHT HIP/PELVIS:    IMPRESSION:  1. Comminuted right basicervical/intertrochanteric femoral fracture.    CT HEAD/CERVICAL SPINE:   IMPRESSION:  No acute intracranial abnormality.      No evidence of cervical spine fracture or traumatic malalignment.  Mild age-indeterminate compression deformities of the superior  endplates of T1 and T2. Correlation with point tenderness recommended.         Assessment/Plan   Fall with right hip fracture  Admit to medical floor  Pain control  Orthopedics consulted  Pt has no known history of structural heart disease. She is able to do >4METS and has no active cardiac symptoms. Therefore, she can proceed directly to surgery with acceptably low cardiac risk    Hypomagnesemia  Mg 0.64 in ED  Replaced in  ED  Recheck level in am    Type II DM  She is currently NPO in anticipation of hip surgery  Metformin on hold while NPO  Accu checks and will add ISS coverage    HTN  Resume Diovan and Metoprolol and monitor    CKD stage 3  Monitor renal function      I spent 60 minutes in the professional and overall care of this patient.      Ting Ho MD

## 2024-02-18 NOTE — CARE PLAN
The patient's goals for the shift include pain control    The clinical goals for the shift include Patient's pain will be a tolerable level by end of shift.    Over the shift, the patient states right hip discomfort is a 3/10 on a 0-10 pain scale which she states is a tolerable level.

## 2024-02-18 NOTE — ANESTHESIA PROCEDURE NOTES
Airway  Date/Time: 2/18/2024 5:40 PM  Urgency: elective    Airway not difficult    Staffing  Performed: CRNA   Authorized by: SHARON Bajwa    Performed by: SHARON Bajwa  Patient location during procedure: OR    Indications and Patient Condition  Indications for airway management: anesthesia  Spontaneous ventilation: present  Sedation level: deep  Preoxygenated: yes  Patient position: sniffing  Mask difficulty assessment: 1 - vent by mask  Planned trial extubation    Final Airway Details  Final airway type: endotracheal airway      Successful airway: ETT  Cuffed: yes   Successful intubation technique: video laryngoscopy  Facilitating devices/methods: intubating stylet  Endotracheal tube insertion site: oral  Blade: Maite  Blade size: #3  ETT size (mm): 7.0  Cormack-Lehane Classification: grade I - full view of glottis  Placement verified by: chest auscultation and capnometry   Measured from: lips  ETT to lips (cm): 21  Number of attempts at approach: 1

## 2024-02-18 NOTE — PROGRESS NOTES
Emergency Medicine Transition of Care Note.    I received Kathy Pappas in signout from Dr. Norton.  Please see the previous ED provider note for all HPI, PE and MDM up to the time of signout at 12 midnight. This is in addition to the primary record.    In brief Kathy Pappas is an 83 y.o. female presenting for   Chief Complaint   Patient presents with    Fall     Mechanical fall; missed last step of stairs at home & fell on right side; was on the ground for about an hour before family came home & called ems; c/o 10/10 right hip pain.     At the time of signout we were awaiting: Orthopedic consultation    ED Course as of 02/18/24 0241   Sat Feb 17, 2024 2149     IMPRESSION:  No acute intracranial abnormality.      No evidence of cervical spine fracture or traumatic malalignment.  Mild age-indeterminate compression deformities of the superior  endplates of T1 and T2. Correlation with point tenderness recommended.   [CF]   2149 IMPRESSION:  1. Comminuted right basicervical/intertrochanteric femoral fracture.   [CF]   2327 I contacted Dr. Castillo At 945pm.  I then reached out again at 11:00 still but have not had a response at the moment. [CF]   Sun Feb 18, 2024 0240 Patient discussed with orthopedic surgery okay to stay at [facility] discussed with hospitalist. [MT]      ED Course User Index  [CF] Sara Norton MD  [MT] Carter Hodge MD         Diagnoses as of 02/18/24 0241   Fall, initial encounter   Closed fracture of right hip, initial encounter (CMS/[facility])   Hypomagnesemia       Medical Decision Making  Patient discussed with Dr. Castillo orthopedic surgery who gave is okay to admit the patient to the medical service at [facility], n.p.o. overnight, ORIF in the a.m. discussed with hospitalist.    Final diagnoses:   [W19.XXXA] Fall, initial encounter   [S72.001A] Closed fracture of right hip, initial encounter (CMS/[facility])   [E83.42] Hypomagnesemia            Procedure  Procedures    Carter Hodge MD

## 2024-02-18 NOTE — ANESTHESIA PREPROCEDURE EVALUATION
Patient: Kathy Pappas    Procedure Information       Anesthesia Start Date/Time: 02/18/24 1731    Procedure: Hip fracture fixation (Right: Hip)    Location: [facility] OR 06 / Virtual [facility]    Surgeons: Alistair Castillo MD            Relevant Problems   Anesthesia (within normal limits)      Cardiovascular   (+) Hypertension      Endocrine   (+) DM type 2 with diabetic dyslipidemia (CMS/HCC)      /Renal   (+) Renal insufficiency      Hematology   (+) Anemia      Infectious Disease   (+) COVID-19       Clinical information reviewed:   Tobacco  Allergies  Meds  Problems  Med Hx  Surg Hx  OB Status    Fam Hx  Soc Hx        NPO Detail:  NPO/Void Status  Date of Last Liquid: 02/18/24  Time of Last Liquid: 0900 (medications with a sip of water)  Date of Last Solid: 03/18/24  Time of Last Solid: 0000         Physical Exam    Airway  Mallampati: II  TM distance: >3 FB  Neck ROM: full     Cardiovascular    Dental   Comments: Poor dentition   Pulmonary    Abdominal            Anesthesia Plan    History of general anesthesia?: yes  History of complications of general anesthesia?: no    ASA 3 - emergent     general     The patient is not a current smoker.    intravenous induction   Anesthetic plan and risks discussed with patient.  Use of blood products discussed with patient who consented to blood products.    Plan discussed with attending.

## 2024-02-18 NOTE — CONSULTS
CC             Right hip pain            HPI             Kathy Pappas is a 83 y.o. female admitted 2/17/2024 for right hip pain and much milder R knee pain.  She was descending stairs last night when she slipped on the last step, having a mechanical fall on her right side.  She was brought to the ED and found to have a right hip fracture (details below). Orthopaedics is consulted for management.  Denies other symptoms, numbness/tingling aside from left significant pain in her bilateral knees. CT head, c-spine neg for acute injury.            HISTORIES (System Generated and “Additional” by interview)       PMH system-generated (PMH, problem list both included since EMR change caused discrepancies): History reviewed. No pertinent past medical history.   Patient Active Problem List   Diagnosis    Abnormal TSH    Allergic rhinitis    Anemia    COVID-19    Diabetes (CMS/[facility])    DM type 2 with diabetic dyslipidemia (CMS/[facility])    Hypertension    Osteoporosis    Renal insufficiency    Vitamin B deficiency    Fall, initial encounter   Additional note: Denies other health issues, DVT/PE, malignancy.    PSH system-generated: History reviewed. No pertinent surgical history.  Additional note: Denies other pertinent injuries/surgeries, including to currently injured area.    FH system-generated: No family history on file.    SH system-generated:   Social History     Tobacco Use    Smoking status: Never    Smokeless tobacco: Never   Substance Use Topics    Alcohol use: Never    Drug use: Never   Additional note: Neg tob, neg etoh, neg illicits per patient interview. Baseline ambulation: Independent. Occupation: Retired (hospital ). Living arrangements:  and lives at home with daughter.    Allergies system-generated:  No Known Allergies  Additional note: No PCN allergy.    Current meds system-generated:   Current Facility-Administered Medications:     acetaminophen (Tylenol) tablet 650 mg, 650 mg, oral,  q4h PRN **OR** acetaminophen (Tylenol) suspension 650 mg, 650 mg, oral, q4h PRN **OR** acetaminophen (Tylenol) suppository 650 mg, 650 mg, rectal, q4h PRN, Ting Ho MD    dextromethorphan-guaifenesin (Robitussin DM)  mg/5 mL oral liquid 5 mL, 5 mL, oral, q4h PRN, Ting Ho MD    dextrose 10 % in water (D10W) infusion, 0.3 g/kg/hr, intravenous, Once PRN, Ting Ho MD    dextrose 5%-0.45 % sodium chloride infusion, 60 mL/hr, intravenous, Continuous, Ting Ho MD, Last Rate: 60 mL/hr at 02/18/24 0529, 60 mL/hr at 02/18/24 0529    dextrose 50 % injection 25 g, 25 g, intravenous, q15 min PRN, Ting Ho MD    docusate sodium (Colace) capsule 100 mg, 100 mg, oral, BID, Ting Ho MD, 100 mg at 02/18/24 0851    glucagon (Glucagen) injection 1 mg, 1 mg, intramuscular, q15 min PRN, Ting Ho MD    guaiFENesin (Mucinex) 12 hr tablet 600 mg, 600 mg, oral, q12h PRN, Ting Ho MD    HYDROmorphone (Dilaudid) injection 0.5 mg, 0.5 mg, intravenous, q3h PRN, Ting Ho MD, 0.5 mg at 02/18/24 0529    insulin lispro (HumaLOG) injection 0-10 Units, 0-10 Units, subcutaneous, q4h, Ting Ho MD    metoprolol succinate XL (Toprol-XL) 24 hr tablet 25 mg, 25 mg, oral, Daily, Ting Ho MD, 25 mg at 02/18/24 0852    morphine injection 4 mg, 4 mg, intravenous, Once, Carter Hodge MD    ondansetron (Zofran) tablet 4 mg, 4 mg, oral, q8h PRN **OR** ondansetron (Zofran) injection 4 mg, 4 mg, intravenous, q8h PRN, Ting Ho MD    simvastatin (Zocor) tablet 40 mg, 40 mg, oral, Nightly, Ting Ho MD    valsartan 320 mg, hydroCHLOROthiazide 25 mg for Diovan HCT, , oral, Daily, Ting Ho MD  Additional note: Does not take blood thinner.     ROS: Neg except HPI.            OBJECTIVE            Physical Exam  Estimated body mass index is 23.24 kg/m² as calculated from the following:    Height as of this encounter: 1.6 m (5'  "3\").    Weight as of this encounter: 59.5 kg (131 lb 2.8 oz).  General: NAD, NLR on RA    VS:  Temp:  [36 °C (96.8 °F)-36.5 °C (97.7 °F)] 36.5 °C (97.7 °F)  Heart Rate:  [74-85] 82  Resp:  [18-20] 18  BP: (126-170)/(50-77) 170/72    Focused MSK exam:  BUE:  -Grossly intact  -Palpable radial pulse, BCR  -Skin senile purpura    -Bone not TTP over major prominences  -Painless ROM at shoulder/elbow/wrist  -Compartments soft and compressible, no additional pain with passive stretch  -Motor: Fires FF (including thumb IP), FE, EPL, IO.  -Sensory: SILT in ax/m/r/u nerve distributions.    RLE:  -Grossly intact  -Faintly palpable DP/PT pulse  -Skin minor abrasion over anterior knee  -TTP about the hip.  Minor tenderness about the knee  -Logroll deferred given known injury.  -Compartments soft and compressible, no additional pain with passive stretch.  -Motor: Fires quads, TA, EHL, GCS  -Sensory: SILT in saph/faheem/sp/dp/t nerve distributions.     LLE:  -Grossly intact  -Faintly palpable DP/PT pulse  -Skin no open wounds, tenting  -Bone not TTP over major prominences  -Neg log roll. Painless ROM at hip/knee/ankle  -Compartments soft and compressible, no additional pain with passive stretch.  -Motor: Fires quads, TA, EHL, GCS  -Sensory: SILT in saph/faheem/sp/dp/t nerve distributions.      Labs (notable/relevant)  WBC 10.9x10^3/uL  Hgb 9.1g/dL  INR 1.1  Plt 234x10^3/uL  Cr 1.76mg/dL  T+S O+  A1c 6.2% on 2/1/2024      Imaging  R hip (AP/Lat), R femur, R knee, AP pelvis radiographs: Intertrochanteric femur fracture with varus collapse and lesser trochanter involvement.  Known osteoporosis.  Significant arterial calcifications.            ASSESSMENT & PLAN           Kathy Pappas is a 83 y.o. female presenting with a R intertrochanteric femur fracture.  Every case is unique, but we discussed treatment in adults generally entails:  Nonsurgical: Bed rest with risks including non/malunion, deconditioning, pna, and pressure sores is " reserved for pts bedridden at baseline or unable to have surgery.  Surgical: AAOS CPGs support  Stable trochanteric patterns (posteromedial cortex intact initially or via reduction) receive a sliding hip screw or cephalomedullary device.  All other patterns (posteromedial cortex not intact, reverse obliquity, subtrochanteric) receive a cephalomedullary device.  Length of cephalomedullary device is debated (generally stable patterns short/intermediate, other patterns short/intermediate/long).  Shared decision making  Patient's fracture is best classified as not currently having the posterior medial cortex intact, which may occur intraoperatively. Given her highly osteoporotic bone and wide canal, a short nail would likely toggle. An intermediate nail will achieve more diaphyseal/isthmic fit while avoiding the higher blood loss of a long nail in an already anemic pt.  Of note, pt's fracture likely has a basicervical femoral neck component and could thus have an intra-capsular component, which would limit it's secondary healing potential. Discussed that elderly displaced femoral neck fractures are treated with arthroplasty, which would be a significantly larger operation given the lack of an intact calcar. Thus, we decided to proceed with a nail, recognizing that nonunion and revision surgery are a very real possibility.   Precautions/brace/activity: NWB RLE for now  Appreciate remainder of care per primary medicine team. Prefer DVT ppx with SCDs and LVX.  Plan for surgery today. Consented, cleared by IM.  Surgical risks discussion: See consent.  PMH, PSH, other considerations discussed.  Chronic anemia (Hgb ~10s) now with superimposed injury may require transfusion.  Well-controlled diabetes (A1c 6.2% on 2/1/2024)  Osteoporosis: Given injury and low bone density on radiographs, please ensure patient follows up with PCP and/or endocrinology for bone density assessment and possible treatment to prevent future  injuries.  CKD (Cr 1.76) can be associated with increased risk of surgical complications including transfusion, length of stay, and even death.  Dispo  Pending operative course

## 2024-02-18 NOTE — ED PROVIDER NOTES
"HPI   No chief complaint on file.      83-year-old female past medical history of diabetes, renal insufficiency, hypertension who presents emerged department after a fall.  Patient states that she missed stepped and slipped while she was coming down the stairs.  She fell onto her right side.  States she did hit her head but she states \"not hard\".  He is on any blood thinners.  Denies any loss of consciousness.  She is only complaining of pain in her right hip.  She states that she was on the ground since 630 until her family came home.  She does have some skin tears on her arms but no other issues or concerns.  She denies any chest pain, shortness of breath, dizziness prior to her fall.                          No data recorded                Patient History   No past medical history on file.  No past surgical history on file.  No family history on file.  Social History     Tobacco Use    Smoking status: Never    Smokeless tobacco: Never   Substance Use Topics    Alcohol use: Never    Drug use: Never       Physical Exam   ED Triage Vitals   Temp Pulse Resp BP   -- -- -- --      SpO2 Temp src Heart Rate Source Patient Position   -- -- -- --      BP Location FiO2 (%)     -- --       Physical Exam  Constitutional:       General: She is not in acute distress.  HENT:      Head: Normocephalic and atraumatic.      Right Ear: Tympanic membrane normal.      Left Ear: Tympanic membrane normal.      Mouth/Throat:      Mouth: Mucous membranes are moist.   Eyes:      Extraocular Movements: Extraocular movements intact.      Conjunctiva/sclera: Conjunctivae normal.      Pupils: Pupils are equal, round, and reactive to light.   Cardiovascular:      Rate and Rhythm: Normal rate and regular rhythm.      Heart sounds: No murmur heard.  Pulmonary:      Effort: Pulmonary effort is normal. No respiratory distress.      Breath sounds: Normal breath sounds. No stridor. No wheezing or rales.   Abdominal:      General: Bowel sounds are " normal. There is no distension.      Tenderness: There is no abdominal tenderness. There is no guarding or rebound.   Musculoskeletal:      Comments: Right leg is externally rotated and shortened.  2+ pulses on the right leg sensation intact.  Decreased range of motion of right hip secondary to pain patient is full range of motion of her left ankle.  Right knee range of motion is decreased secondary to pain from right hip but no tenderness palpation over her right knee or distal femur proximal femur.  Patient does have skin tears on her bilateral forearms but no pain or obvious deformities and full range of motion of her upper extremities and of her left extremity.     Skin:     General: Skin is warm and dry.      Coloration: Skin is not jaundiced.      Findings: No bruising or lesion.   Neurological:      General: No focal deficit present.      Mental Status: She is alert and oriented to person, place, and time. Mental status is at baseline.      Cranial Nerves: No cranial nerve deficit.      Motor: No weakness.   Psychiatric:         Mood and Affect: Mood normal.       Labs Reviewed - No data to display  No orders to display       ED Course & [facility]   ED Course as of 03/03/24 1226   Sat Feb 17, 2024 2149     IMPRESSION:  No acute intracranial abnormality.      No evidence of cervical spine fracture or traumatic malalignment.  Mild age-indeterminate compression deformities of the superior  endplates of T1 and T2. Correlation with point tenderness recommended.   [CF]   2149 IMPRESSION:  1. Comminuted right basicervical/intertrochanteric femoral fracture.   [CF]   4537 I contacted Dr. Castillo At 945pm.  I then reached out again at 11:00 still but have not had a response at the moment. [CF]   Sun Feb 18, 2024   0240 Patient discussed with orthopedic surgery okay to stay at [facility] discussed with hospitalist. [MT]      ED Course User Index  [CF] Sara Norton MD  [MT] Carter Hodge MD          Diagnoses as of 03/03/24 1226   Fall, initial encounter   Closed fracture of right hip, initial encounter (CMS/[facility])   Hypomagnesemia       Medical Decision Making  This is an 83-year-old female who presents to the emergency department for a fall.  Patient has no focal logical deficits.  She does have decreased range of motion on her right hip and is inverted with good pulses in that leg and sensations intact.  CT of her head and neck showed no acute abnormality x-ray of her right knee and right hip were obtained which showed a right hip fracture.  I did reach out to Dr. Castillo with orthopedics but I am still waiting to hear back.  At this time I signed patient out to Dr. Hodge pending Ortho consult.    EKG on my Read shows sinus rhythm at a rate of 74 bpm no ST changes or elevations nonischemic EKG, normal intervals.        Procedure  Procedures     Sara Norton MD  03/03/24 1226       Sara Norton MD  03/06/24 1217

## 2024-02-19 NOTE — NURSING NOTE
Pt oriented to self only upon returning from PACU.  Pt hallucinating, grasping at things she sees on the ceiling and seeing people that are not there.  Attempted to reconnect IV fluids but patient kept trying to pull out IV, stating that her arm was dripping blood.  Unable to give insulin due to patient pulling away from touch.  Pt continues to talk to people that aren't there at this time, yelling out to people at times.  Patient unable to be reoriented to hospital, kept saying she needs to get up to get to her room or to her couch.

## 2024-02-19 NOTE — PROGRESS NOTES
Kathy Pappas is a 83 y.o. female on day 1 of admission presenting with Fall, initial encounter.    Subjective   Kathy Pappas is an 83 y.o. female with history of diabetes, hypertension, and hyperlipidemia presenting with a fall and right hip pain.  She states that she was coming downstairs tonight when she slipped on the penultimate step and fell on her right side. She bruised both knees and hit forehead on the floor.  She was brought to the ED by EMS and hip imaging study yielded a comminuted right basicervical/intertrochanteric femoral fracture. Pt denies having any antecedent or associated lightheadedness, chest pain, palpitations or SOB. There was no LOC. No HA, visual changes, nausea or vomiting. She reports being fairly active and is able to do >4METS. She denies history of structural heart disease including MI.     2/19: After surgery patient became unresponsive and arrested. ROSC was achieved after 4 rounds of CPR/Epi. Seizure active was noted as well. By the time I saw there this AM she was extubated and agitated.          Objective     Constitutional:       General: She is not in acute distress.     Appearance: Normal appearance.   HENT:      Head: Normocephalic and atraumatic.      Nose: Nose normal.      Mouth/Throat:      Mouth: Mucous membranes are dry.      Pharynx: Oropharynx is clear. No oropharyngeal exudate or posterior oropharyngeal erythema.   Eyes:      General: No scleral icterus.        Right eye: No discharge.         Left eye: No discharge.      Conjunctiva/sclera: Conjunctivae normal.   Cardiovascular:      Rate and Rhythm: Normal rate and regular rhythm.      Heart sounds: Normal heart sounds. No murmur heard.  Pulmonary:      Breath sounds: Normal breath sounds. No wheezing, rhonchi or rales.   Abdominal:      Palpations: Abdomen is soft. There is no mass.      Tenderness: There is no abdominal tenderness. There is no right CVA tenderness, guarding or rebound.  "  Musculoskeletal:      Cervical back: Neck supple.      Right lower leg: Surgical dressing clean and dry      Left lower leg: No edema.     Lymphadenopathy:      Cervical: No cervical adenopathy.   Skin:     General: Skin is warm and dry.      Capillary Refill: Capillary refill takes less than 2 seconds.      Findings: Bruising present.      Comments: Bruises seen on both knees   Neurological:      General: No focal deficit present. She is delirious.   Psychiatric:        agitated      Last Recorded Vitals  Blood pressure 157/68, pulse 99, temperature 37.4 °C (99.3 °F), resp. rate 16, height 1.6 m (5' 2.99\"), weight 59.4 kg (131 lb), SpO2 95 %.  Intake/Output last 3 Shifts:  I/O last 3 completed shifts:  In: 1318.3 (22.2 mL/kg) [I.V.:1318.3 (22.2 mL/kg)]  Out: 350 (5.9 mL/kg) [Urine:350 (0.2 mL/kg/hr)]  Weight: 59.4 kg     Relevant Results                             Assessment/Plan     Cardiac Arrest   -ROSC achieved after 4 rounds of CPR on 2/18  -she is now extubated and awake, but delirious     Seizures   -before cardiac arrest patient was noted to have seixure activity   -CTH without acute process   -EEG pending   -Consult Neuro    Acute Hypoxemic Respiratory Failure   -during cardiac arrest   -Patient is now extubated and on NC    Fall with right hip fracture  -S/P ORIF on 2/18  -plan on Lovenox on DC for 6w for DVT ppx  -WBAT  -Ortho consulting     Hypomagnesemia  -replete PRN     Type II DM  -SSI IP      HTN  -home meds     CKD stage 3  Monitor renal function        Keshav Hubbard MD PhD      "

## 2024-02-19 NOTE — CARE PLAN
The patient's goals for the shift include pain control    The clinical goals for the shift include safety      Problem: Pain  Goal: Free from acute confusion related to pain meds throughout the shift  Outcome: Not Progressing

## 2024-02-19 NOTE — PROGRESS NOTES
Patient s/p R ORIF of hip, [facility] called, transferred to ICU now intubated. Patient will need PT OT post op, awaiting evals. TCC to continue to follow.     Patient was extubated today, PT OT pending , anticipate SNF discharge s/p hip repair. TCC to follow up once evals are completed.

## 2024-02-19 NOTE — PROGRESS NOTES
S: JAIRO since OR. Still somnolent in PACU. Pain controlled. Daughter updated by me personally, pleased with care.          O  VS:  Temp:  [36.4 °C (97.6 °F)-37.3 °C (99.2 °F)] 36.4 °C (97.6 °F)  Heart Rate:  [64-85] 75  Resp:  [14-20] 16  BP: (126-197)/(50-89) 144/66    Gen: NAD  Focused exam of operative extremity:  -Dressing with minor stains  -SILT in S/S/SP/DP/T/F distributions  -Able to flex Q, TA, GS  -Palpable DP pulse, symmetric to contralateral  -Thigh high TEDs, SCDs in place    Drain  Na  Micro  Na  PT/OT  Pending  Labs (pertinent)  In AM  Imaging  R femur (AP/Lat) radiographs: Satisfactory reduction and fixation of right proximal femur fracture.          A&P: Kathy Pappas is a 83 y.o. female s/p open reduction internal fixation of right proximal femur fracture on 2/18/2024.  Appreciate care from internal medicine primary team  Abx prophylaxis: 24hr periop  Activity: WBAT  Analgesia: Multimodal with breakthrough   Consults: Appreciate therapy, SW, case mgmt  Diet: Routine. DC IVF when adequate PO intake  Dressing: Aquacel/Mepilex for 7d. Patient will remove at home then apply daily dry dressing PRN  DVT ppx: Early mobilization, SCDs, pharmacologic ( prefer LVX 6wks )  BLE swelling: Compression hose  DC: Pending PT/OT. F/u with me 2wks after DC.  Patient verbalized understanding. All questions answered.

## 2024-02-19 NOTE — PROGRESS NOTES
Kathy Pappas is a 83 y.o. female admitted for Fall, initial encounter. Pharmacy reviewed the patient's [id number] medications and allergies for accuracy.    The list below reflects the PTA list prior to pharmacy medication history. A summary a changes to the PTA medication list has been listed below. Please review each medication in order reconciliation for additional clarification and justification.    No changes    Source of information: ss, doc visits    Medications added:    Medications modified:    Medications to be removed:    Medications of concern:      Prior to Admission Medications   Prescriptions Last Dose Informant Patient Reported? Taking?   cholecalciferol (Vitamin D-3) 50 MCG (2000 UT) tablet   Yes No   Sig: Take 1 tablet (2,000 Units) by mouth once daily.   cyanocobalamin (Vitamin B-12) 500 mcg tablet   Yes No   Sig: Take 1 tablet (500 mcg) by mouth once daily.   ferrous sulfate 325 (65 Fe) MG EC tablet   Yes No   Sig: Take 1 tablet by mouth every other day.   ibandronate (Boniva) 150 mg tablet   No No   Sig: Take 1 tablet (150 mg) by mouth every 30 (thirty) days.   lisinopril 20 mg tablet   Yes No   Sig: Take 1 tablet (20 mg) by mouth once daily.   metFORMIN (Glucophage) 850 mg tablet   No No   Sig: Take 1 tablet (850 mg) by mouth 2 times a day with meals.   metoprolol succinate XL (Toprol-XL) 25 mg 24 hr tablet   No No   Sig: Take 1 tablet (25 mg) by mouth once daily.   simvastatin (Zocor) 40 mg tablet   Yes No   Sig: Take 1 tablet (40 mg) by mouth once daily at bedtime.   valsartan-hydrochlorothiazide (Diovan-HCT) 320-25 mg tablet   Yes No   Sig: Take 1 tablet by mouth once daily.      Facility-Administered Medications: None       Shawanda Daniel CPhT

## 2024-02-19 NOTE — CONSULTS
Critical Care Medicine Consult      Reason For Consult  Cardiac Arrest and Intubation    History Of Present Illness  Kathy Pappas is a 83 y.o. female with a past medical history of Hyperlipidemia, hypertension, Type 2 diabetes, and CKD who is being admitted to the ICU due to suffering a cardiac arrest on 2/19/24 at 2 am. Patient had surgery for a right femoral fracture on 2/18 and per the bedside nurse became confused and unresponsive. 4 rounds of CPR were conducted with a total of 4 ml of epinephrine given. Patient was intubated during the code blue.     On admission on 2/18: aKthy Pappas is an 83 y.o. female with history of diabetes, hypertension, and hyperlipidemia who presented with a fall and right hip pain.  She stated that she was coming downstairs at night when she slipped on the penultimate step and fell on her right side. She bruised both knees and hit forehead on the floor.  She was brought to the ED by EMS and hip imaging study yielded a comminuted right basicervical/intertrochanteric femoral fracture. Pt denied having any antecedent or associated lightheadedness, chest pain, palpitations or SOB. There was no LOC. No HA, visual changes, nausea or vomiting. She reported being fairly active and is able to do >4METS. She denied history of structural heart disease including MI.     Past Medical History:   Diagnosis Date    Anemia     Diabetes mellitus (CMS/HCC)     Hyperlipidemia     Hypertension     Renal insufficiency      History reviewed. No pertinent surgical history.  Medications Prior to Admission   Medication Sig Dispense Refill Last Dose    cholecalciferol (Vitamin D-3) 50 MCG (2000 UT) tablet Take 1 tablet (2,000 Units) by mouth once daily.       cyanocobalamin (Vitamin B-12) 500 mcg tablet Take 1 tablet (500 mcg) by mouth once daily.       ferrous sulfate 325 (65 Fe) MG EC tablet Take 1 tablet by mouth every other day.       ibandronate (Boniva) 150 mg tablet Take 1 tablet (150 mg)  by mouth every 30 (thirty) days. 90 tablet 1     lisinopril 20 mg tablet Take 1 tablet (20 mg) by mouth once daily.       metFORMIN (Glucophage) 850 mg tablet Take 1 tablet (850 mg) by mouth 2 times a day with meals. 180 tablet 3     metoprolol succinate XL (Toprol-XL) 25 mg 24 hr tablet Take 1 tablet (25 mg) by mouth once daily. 90 tablet 1     simvastatin (Zocor) 40 mg tablet Take 1 tablet (40 mg) by mouth once daily at bedtime.       valsartan-hydrochlorothiazide (Diovan-HCT) 320-25 mg tablet Take 1 tablet by mouth once daily.        Patient has no known allergies.  Social History     Tobacco Use    Smoking status: Never    Smokeless tobacco: Never   Substance Use Topics    Alcohol use: Never    Drug use: Never     No family history on file.    Scheduled Medications:   ceFAZolin, 2 g, intravenous, q8h  docusate sodium, 100 mg, oral, BID  [MAR Hold] insulin lispro, 0-10 Units, subcutaneous, Before meals & nightly  metoprolol succinate XL, 25 mg, oral, Daily  morphine, 4 mg, intravenous, Once  [MAR Hold] pantoprazole, 40 mg, intravenous, Daily  simvastatin, 40 mg, oral, Nightly  valsartan 320 mg, hydroCHLOROthiazide 25 mg for Diovan HCT, , oral, Daily         Continuous Medications:   dextrose 5%-0.45 % sodium chloride, 60 mL/hr, Last Rate: 60 mL/hr (02/18/24 1305)  fentaNYL,  mcg/hr, Last Rate: 125 mcg/hr (02/19/24 0537)  propofol, 5-20 mcg/kg/min, Last Rate: 10 mcg/kg/min (02/19/24 0622)  sodium chloride 0.9%, 50 mL/hr, Last Rate: 50 mL/hr (02/19/24 0200)         PRN Medications:   PRN medications: acetaminophen **OR** acetaminophen **OR** acetaminophen, dextromethorphan-guaifenesin, dextrose 10 % in water (D10W), dextrose, glucagon, guaiFENesin, HYDROmorphone, ondansetron **OR** ondansetron, oxyCODONE, oxygen    Review of Systems:  Review of Systems   Constitutional: Negative.    HENT: Negative.     Respiratory:          Intubated      Cardiovascular: Negative.    Gastrointestinal: Negative.    Endocrine:  Negative.    Genitourinary: Negative.    Musculoskeletal:         Right femoral fracture    Allergic/Immunologic: Negative.    Neurological: Negative.    Hematological: Negative.    Psychiatric/Behavioral:  The patient is nervous/anxious.         Objective   Vitals:  Most Recent:  Vitals:    02/19/24 0746   BP:    Pulse:    Resp: 12   Temp:    SpO2: 99%       24hr Min/Max:  Temp  Min: 36.4 °C (97.6 °F)  Max: 37.3 °C (99.2 °F)  Pulse  Min: 64  Max: 124  BP  Min: 131/60  Max: 197/51  Resp  Min: 12  Max: 24  SpO2  Min: 92 %  Max: 100 %    LDA:   ETT  7.5 mm (Active)   Placement Date/Time: 02/19/24 0130   Earliest Known Present: 02/19/24  Hand Hygiene Completed: Yes  Mask Ventilation: Vent by mask  Technique: Rapid sequence;Video laryngoscopy;Stylet  ETT Type: ETT - single  Single Lumen Tube Size: 7.5 mm  Cuffed: Ye...   Number of days: 0       ETT  7.5 mm (Active)   Placement Date/Time: 02/19/24 0128   Hand Hygiene Completed: Yes  ETT Type: ETT - single  Single Lumen Tube Size: 7.5 mm  Location: Oral  Airway Insertion Attempts: 1  Placement Verification: Auscultation  Placed By: (c) Other (Comment)   Number of days: 0         Vent settings:  Vent Mode: Pressure support  FiO2 (%):  [40 %-100 %] 40 %  S RR:  [16-18] 18  S VT:  [400 mL] 400 mL  PEEP/CPAP (cm H2O):  [5 cm H20] 5 cm H20  CA SUP:  [10 cm H20] 10 cm H20  MAP (cm H2O):  [7-8.3] 7    Hemodynamic parameters for last 24 hours:         Intake/Output Summary (Last 24 hours) at 2/19/2024 0806  Last data filed at 2/18/2024 2110  Gross per 24 hour   Intake 1156 ml   Output 250 ml   Net 906 ml           Physical exam:    Physical Exam  HENT:      Head: Normocephalic and atraumatic.      Mouth/Throat:      Pharynx: Posterior oropharyngeal erythema present.      Comments: Intubated   Eyes:      Extraocular Movements: Extraocular movements intact.      Pupils: Pupils are equal, round, and reactive to light.   Cardiovascular:      Rate and Rhythm: Regular rhythm.  Tachycardia present.      Pulses: Normal pulses.      Heart sounds: Normal heart sounds.   Pulmonary:      Effort: Pulmonary effort is normal.      Breath sounds: Normal breath sounds.   Abdominal:      Palpations: Abdomen is soft.   Musculoskeletal:         General: Signs of injury present.      Cervical back: Normal range of motion.   Neurological:      General: No focal deficit present.      Mental Status: She is alert and oriented to person, place, and time.          Lab/Radiology/Diagnostic Review:  Results for orders placed or performed during the hospital encounter of 02/17/24 (from the past 24 hour(s))   POCT GLUCOSE   Result Value Ref Range    POCT Glucose 159 (H) 74 - 99 mg/dL   Protime-INR   Result Value Ref Range    Protime 12.6 9.8 - 12.8 seconds    INR 1.1 0.9 - 1.1   Type And Screen   Result Value Ref Range    ABO TYPE O     Rh TYPE POS     ANTIBODY SCREEN NEG    Prepare RBC: 1 Units   Result Value Ref Range    PRODUCT CODE J8302B68     Unit Number [id number]     Unit ABO O     Unit RH POS     XM INTEP COMP     Dispense Status IS     Blood Expiration Date March 13, 2024 23:59 EDT     PRODUCT BLOOD TYPE 5100     UNIT VOLUME 350    POCT GLUCOSE   Result Value Ref Range    POCT Glucose 155 (H) 74 - 99 mg/dL   POCT GLUCOSE   Result Value Ref Range    POCT Glucose 234 (H) 74 - 99 mg/dL   POCT GLUCOSE   Result Value Ref Range    POCT Glucose 257 (H) 74 - 99 mg/dL   CBC   Result Value Ref Range    WBC 9.1 4.4 - 11.3 x10*3/uL    nRBC 0.0 0.0 - 0.0 /100 WBCs    RBC 2.29 (L) 4.00 - 5.20 x10*6/uL    Hemoglobin 7.1 (L) 12.0 - 16.0 g/dL    Hematocrit 22.6 (L) 36.0 - 46.0 %    MCV 99 80 - 100 fL    MCH 31.0 26.0 - 34.0 pg    MCHC 31.4 (L) 32.0 - 36.0 g/dL    RDW 12.8 11.5 - 14.5 %    Platelets 212 150 - 450 x10*3/uL   Comprehensive Metabolic Panel   Result Value Ref Range    Glucose 221 (H) 74 - 99 mg/dL    Sodium 138 136 - 145 mmol/L    Potassium 3.7 3.5 - 5.3 mmol/L    Chloride 104 98 - 107 mmol/L     Bicarbonate 17 (L) 21 - 32 mmol/L    Anion Gap 21 (H) 10 - 20 mmol/L    Urea Nitrogen 29 (H) 6 - 23 mg/dL    Creatinine 1.85 (H) 0.50 - 1.05 mg/dL    eGFR 27 (L) >60 mL/min/1.73m*2    Calcium 7.2 (L) 8.6 - 10.3 mg/dL    Albumin 3.1 (L) 3.4 - 5.0 g/dL    Alkaline Phosphatase 52 33 - 136 U/L    Total Protein 5.2 (L) 6.4 - 8.2 g/dL     (H) 9 - 39 U/L    Bilirubin, Total 0.5 0.0 - 1.2 mg/dL     (H) 7 - 45 U/L   Troponin I, High Sensitivity   Result Value Ref Range    Troponin I, High Sensitivity 26 (H) 0 - 13 ng/L   Magnesium   Result Value Ref Range    Magnesium 1.55 (L) 1.60 - 2.40 mg/dL   Blood Gas Arterial Full Panel   Result Value Ref Range    POCT pH, Arterial 7.28 (L) 7.38 - 7.42 pH    POCT pCO2, Arterial 46 (H) 38 - 42 mm Hg    POCT pO2, Arterial 29 (LL) 85 - 95 mm Hg    POCT SO2, Arterial 34 (L) 94 - 100 %    POCT Oxy Hemoglobin, Arterial 33.6 (L) 94.0 - 98.0 %    POCT Hematocrit Calculated, Arterial 20.0 (L) 36.0 - 46.0 %    POCT Sodium, Arterial 135 (L) 136 - 145 mmol/L    POCT Potassium, Arterial 4.0 3.5 - 5.3 mmol/L    POCT Chloride, Arterial 106 98 - 107 mmol/L    POCT Ionized Calcium, Arterial 0.99 (L) 1.10 - 1.33 mmol/L    POCT Glucose, Arterial 243 (H) 74 - 99 mg/dL    POCT Lactate, Arterial 5.5 (HH) 0.4 - 2.0 mmol/L    POCT Base Excess, Arterial -4.8 (L) -2.0 - 3.0 mmol/L    POCT HCO3 Calculated, Arterial 21.6 (L) 22.0 - 26.0 mmol/L    POCT Hemoglobin, Arterial 6.6 (L) 12.0 - 16.0 g/dL    POCT Anion Gap, Arterial 11 10 - 25 mmo/L    Patient Temperature 37.0 degrees Celsius    FiO2 100 %   Prepare RBC: 1 Units   Result Value Ref Range    PRODUCT CODE U1348H24     Unit Number [id number]     Unit ABO O     Unit RH POS     XM INTEP COMP     Dispense Status XM     Blood Expiration Date March 15, 2024 23:59 EDT     PRODUCT BLOOD TYPE 5100     UNIT VOLUME 350    POCT GLUCOSE   Result Value Ref Range    POCT Glucose 161 (H) 74 - 99 mg/dL     XR hip right with pelvis when performed 2 or 3  views    Result Date: 2/19/2024  Interpreted By:  Armin Clayton, STUDY: XR HIP RIGHT WITH PELVIS WHEN PERFORMED 2 OR 3 VIEWS; ;  2/18/2024 9:03 pm   INDICATION: Signs/Symptoms:Postop.   COMPARISON: 02/17/2024 radiographs at 9:14 p.m.   ACCESSION NUMBER(S): [id number]   ORDERING CLINICIAN: BRENT MARTÍNEZ   FINDINGS: The right femoral intertrochanteric fracture has been stabilized using short intramedullary stiven and screw. The fracture fragments are in anatomic alignment. The tip of the screw lies within the femoral head.   The overall bony mineralization is decreased. No other fracture or dislocation of the pelvis or left hip is noted. Medial calcific sclerosis of the arteries is present as seen with diabetes or end stage renal disease.       Right intertrochanteric fracture fixation.     MACRO: None   Signed by: Armin Clayton 2/19/2024 7:37 AM Dictation workstation:   [id number]    XR chest 1 view    Result Date: 2/19/2024  Interpreted By:  Baltazar Chapa, STUDY: XR CHEST 1 VIEW;  2/19/2024 2:19 am   INDICATION: Signs/Symptoms:Intubation.   COMPARISON: None.   ACCESSION NUMBER(S): [id number]   ORDERING CLINICIAN: NUHA FERRERA   FINDINGS:   CARDIOMEDIASTINAL SILHOUETTE: Cardiomediastinal silhouette is normal in size and configuration.   LUNGS/PLEURA: There are no consolidations.There are no pleural effusions. There is no demonstrated pneumothorax.   LINES/TUBES: Interval placement of endotracheal tube terminating 2.5 cm above the aleksandr. Enteric tube courses below the diaphragm and coiled in gastric fundus with its tip pointing toward the gastroesophageal junction.   BONES: No evidence of acute osseous abnormality. There is a chronic left proximal humerus fracture as also visualized on prior radiographs.       1.  No evidence of acute cardiopulmonary process. Lines and tubes as described above.     Signed by: Baltazar Chapa 2/19/2024 2:36 AM Dictation workstation:   [id number]    XR femur right 2+ views    Result  Date: 2/18/2024  Interpreted By:  Kris Deleon, STUDY: XR FEMUR RIGHT 2+ VIEWS;  2/18/2024 12:32 pm   INDICATION: Signs/Symptoms:AP and lateral. Ruling out adjacent injury prior to surgery. Low pre-test probability. Thank you..   COMPARISON: None.   ACCESSION NUMBER(S): [id number]   ORDERING CLINICIAN: BRENT MARTÍNEZ   FINDINGS: Bony structures:  Comminuted impacted basicervical and intertrochanteric fracture with varus angulation. The remaining bony structures are intact.   Joint spaces:  The hip joint space is maintained. There is mild tricompartment arthrosis at the knee, particularly at the medial joint compartment with mild osteophytosis. No definite knee joint effusion.   Soft tissues:  Fairly extensive atherosclerotic calcification of the superficial femoral artery and popliteal artery.   Other:  None significant       Hip fracture.   MACRO: None   Signed by: Kris Deleon 2/18/2024 12:39 PM Dictation workstation:   [id number]    CT head wo IV contrast    Result Date: 2/17/2024  Interpreted By:  Baltazar Chapa, STUDY: CT HEAD WO IV CONTRAST; CT CERVICAL SPINE WO IV CONTRAST;  2/17/2024 9:32 pm   INDICATION: Signs/Symptoms:fall. Altered mental status   COMPARISON: No prior cross-sectional imaging is available for comparison.   ACCESSION NUMBER(S): [id number]; [id number]   ORDERING CLINICIAN: OLIVER RICHARDS   TECHNIQUE: Axial noncontrast CT images of the head and cervical spine.   FINDINGS: BRAIN PARENCHYMA: Gray-white matter interfaces are preserved. No mass, mass effect or midline shift. There are mild periventricular white matter hypodensities suggestive of mild chronic microvascular ischemic change.   HEMORRHAGE: No acute intracranial hemorrhage. VENTRICLES and EXTRA-AXIAL SPACES: Normal size for age. EXTRACRANIAL SOFT TISSUES: Unremarkable. CALVARIUM: No depressed calvarial fracture.   SOFT TISSUES: Within normal limits. PARANASAL SINUSES: No hemorrhage in the paranasal sinuses. There is moderate  polypoid mucosal thickening of the left maxillary sinus. MASTOIDS: Within normal limits. ORBITS: Grossly normal.   ALIGNMENT: Normal cervical lordosis. VERTEBRAE: No acute fracture. Cervical vertebral body heights are maintained. There are mild age-indeterminate compression deformities of the superior endplates of T1 and T2 vertebral bodies with associated superior endplate Schmorl nodes. There are no suspicious osseous lesions. Mild osteopenia. Mild degenerative disc space narrowing and endplate reactive change at C4-C5. SPINAL CANAL: No significant spinal canal stenosis. PREVERTEBRAL SOFT TISSUES: No prevertebral soft tissue swelling. LUNG APICES: Imaged portion of the lung apices are within normal limits.   OTHER FINDINGS: None.         No acute intracranial abnormality.   No evidence of cervical spine fracture or traumatic malalignment. Mild age-indeterminate compression deformities of the superior endplates of T1 and T2. Correlation with point tenderness recommended.   Signed by: Baltazar Chapa 2/17/2024 9:39 PM Dictation workstation:   [id number]    CT cervical spine wo IV contrast    Result Date: 2/17/2024  Interpreted By:  Baltazar Chapa, STUDY: CT HEAD WO IV CONTRAST; CT CERVICAL SPINE WO IV CONTRAST;  2/17/2024 9:32 pm   INDICATION: Signs/Symptoms:fall. Altered mental status   COMPARISON: No prior cross-sectional imaging is available for comparison.   ACCESSION NUMBER(S): [id number]; [id number]   ORDERING CLINICIAN: OLIVER RICHARDS   TECHNIQUE: Axial noncontrast CT images of the head and cervical spine.   FINDINGS: BRAIN PARENCHYMA: Gray-white matter interfaces are preserved. No mass, mass effect or midline shift. There are mild periventricular white matter hypodensities suggestive of mild chronic microvascular ischemic change.   HEMORRHAGE: No acute intracranial hemorrhage. VENTRICLES and EXTRA-AXIAL SPACES: Normal size for age. EXTRACRANIAL SOFT TISSUES: Unremarkable. CALVARIUM: No depressed  calvarial fracture.   SOFT TISSUES: Within normal limits. PARANASAL SINUSES: No hemorrhage in the paranasal sinuses. There is moderate polypoid mucosal thickening of the left maxillary sinus. MASTOIDS: Within normal limits. ORBITS: Grossly normal.   ALIGNMENT: Normal cervical lordosis. VERTEBRAE: No acute fracture. Cervical vertebral body heights are maintained. There are mild age-indeterminate compression deformities of the superior endplates of T1 and T2 vertebral bodies with associated superior endplate Schmorl nodes. There are no suspicious osseous lesions. Mild osteopenia. Mild degenerative disc space narrowing and endplate reactive change at C4-C5. SPINAL CANAL: No significant spinal canal stenosis. PREVERTEBRAL SOFT TISSUES: No prevertebral soft tissue swelling. LUNG APICES: Imaged portion of the lung apices are within normal limits.   OTHER FINDINGS: None.         No acute intracranial abnormality.   No evidence of cervical spine fracture or traumatic malalignment. Mild age-indeterminate compression deformities of the superior endplates of T1 and T2. Correlation with point tenderness recommended.   Signed by: Baltazar Chapa 2/17/2024 9:39 PM Dictation workstation:   [id number]    XR knee right 1-2 views    Result Date: 2/17/2024  Interpreted By:  Orion Lucas, STUDY: XR KNEE RIGHT 1-2 VIEWS;  2/17/2024 9:24 pm   INDICATION: Signs/Symptoms:fall.   COMPARISON: None   ACCESSION NUMBER(S): [id number]   ORDERING CLINICIAN: OLIVER RICHARDS   FINDINGS: Two views right knee   No acute fracture or dislocation. Moderate-to-severe tricompartmental degenerative changes most pronounced in the medial and patellofemoral compartments with joint space narrowing and marginal osteophytosis. Small joint effusion. Severe vascular calcifications.       1. No acute osseous abnormality identified.       Signed by: Orion Lucas 2/17/2024 9:31 PM Dictation workstation:   [id number]    XR hip right with pelvis when  performed 2 or 3 views    Result Date: 2/17/2024  Interpreted By:  Orion Lucas, STUDY: XR HIP RIGHT WITH PELVIS WHEN PERFORMED 2 OR 3 VIEWS;  2/17/2024 9:24 pm   INDICATION: Signs/Symptoms:pain.   COMPARISON: None.   ACCESSION NUMBER(S): [id number]   ORDERING CLINICIAN: OLIVER RICHARDS   FINDINGS: AP view of the pelvis with AP and lateral views of the right hip   Basicervical/intertrochanteric fracture of the right hip with an additional fracture Prolene extending through the base of the lesser trochanter. Mild angulation of the intertrochanteric fracture site and mild displacement of the lesser trochanter. No dislocation. Heavy vascular calcifications in the pelvis and upper thighs.         1. Comminuted right basicervical/intertrochanteric femoral fracture.       Signed by: Orion Lucas 2/17/2024 9:30 PM Dictation workstation:   [id number]      Assessment/Plan   Principal Problem:    Fall, initial encounter  Active Problems:    Fracture of right hip, closed, initial encounter (CMS/[facility])    Kathy Pappas is a 83 y.o. female with a past medical history of Hyperlipidemia, hypertension, Type 2 diabetes, and CKD who is being admitted to the ICU due to suffering a cardiac arrest on 2/19/24 at 2 am. Patient had surgery for a right femoral fracture on 2/18 and per the bedside nurse became confused and unresponsive. 4 rounds of CPR were conducted with a total of 4 ml of epinephrine given. Patient was intubated during the code blue.     Cardiac Arrest   2/19/24: Patient with cardiac arrest between 1 and 2 am with 7 minutes of downtime   1 round of epinephrine pushed   Seizures pre cardiac arrest   CT head with no acute intracranial abnormalities   Patient intubated - successful breathing trial and extubated as of 12 pm    Hemodynamically stable   Alert and oriented   PRN 5 mg Haldol for agitation for 3 days   Neurology consulted   EEG ordered for pre cardiac arrest seizures   No hypothermia protocol due to  patient following commands     2. Acute Respiratory failure requiring intubation   2/19/24: Intubated during cardiac arrest   VBG showed 7.33 with pCO2 53  Patient passed breathing trial today - extubation today     3. Right Femoral Fracture   2/19/24: PRN pain management     4. Hypertension   2/19/24: Continue Valsartan-hydrochlorothiazide   25 mg Metoprolol   PRN Hydralazine     5. Type 2 diabetes   2/19/24: Most recent glucose 221   Level 2 lispro sliding scale     6. Elevated Liver Enzymes   2/19/24: Patient on home statin medication   Hold statin until Enzymes WNL     7. Hypomagnesemia  2/19/24: Mg 1.53   Replaced with 2 mg      8.  New onset seizures  2/19/2024: As per the nurse the patient had 2 episodes of seizures last night  Ordered EEG and neurology consult    I spent 45 minutes of cumulative critical care time with the patient.  Greater than 50% of that time was spent in the direct collaboration and or coordination of care of the patient.

## 2024-02-19 NOTE — PROCEDURES
Intubation    Date/Time: 2/19/2024 7:07 AM    Performed by: Ernie Galan MD  Authorized by: Ernie Galan MD    Consent:     Consent obtained:  Emergent situation  Universal protocol:     Test results available: yes    Pre-procedure details:     Indications: cardio/pulmonary arrest      Patient status:  Unresponsive    Look externally: no concerns      Mouth opening - incisor distance:  3 or more finger widths    Mallampati score:  IV    Obstruction: none      Neck mobility: normal      Pharmacologic strategy: none      Induction agents:  None    Paralytics:  None  Procedure details:     Number of attempts:  1  Successful intubation attempt details:     Intubation technique: video assisted      Laryngoscope blade:  Mac 3    Bougie used: no      Tube size (mm):  7.5    Tube type:  Cuffed    Tube visualized through cords: yes    Placement assessment:     Tube secured with:  Adhesive tape and ETT shearer    Breath sounds:  Equal    Placement verification: chest rise and CXR verification      CXR findings:  Appropriate position  Post-procedure details:     Procedure completion:  Tolerated    Patient was intubated during the CODE BLUE process.

## 2024-02-19 NOTE — OP NOTE
Hip fracture open reduction internal fixation (R) Operative Note     Date: 2024  OR Location: [facility] OR    Name: Kathy Pappas, : 1940, Age: 83 y.o., MRN: [id number], Sex: female    Diagnosis  Pre-op Diagnosis     * Fracture of right hip, closed, initial encounter (CMS/[facility]) [S72.001A] Post-op Diagnosis     * Fracture of right hip, closed, initial encounter (CMS/[facility]) [S72.001A]     Procedures  Right hip fracture open reduction internal fixation  89108 - AZ TX INTER/AZ/SUBTRCHNTRIC FEM FX IMED IMPLTSCREW      Surgeons      * Alistair Castillo - Primary    Resident/Fellow/Other Assistant:  Surgeon(s) and Role:    Procedure Summary  Anesthesia: General  ASA: III  Anesthesia Staff: CRNA: KEYSHA Bajwa-CRNA  Estimated Blood Loss: 150mL  Intra-op Medications:   Administrations occurring from 1430 to 1620 on 24:   Medication Name Total Dose   dextromethorphan-guaifenesin (Robitussin DM)  mg/5 mL oral liquid 5 mL Cannot be calculated   dextrose 10 % in water (D10W) infusion Cannot be calculated   dextrose 50 % injection 25 g Cannot be calculated   docusate sodium (Colace) capsule 100 mg Cannot be calculated   glucagon (Glucagen) injection 1 mg Cannot be calculated   guaiFENesin (Mucinex) 12 hr tablet 600 mg Cannot be calculated   HYDROmorphone (Dilaudid) injection 0.5 mg Cannot be calculated   insulin lispro (HumaLOG) injection 0-10 Units Cannot be calculated   metoprolol succinate XL (Toprol-XL) 24 hr tablet 25 mg Cannot be calculated   morphine injection 4 mg Cannot be calculated   oxyCODONE (Roxicodone) immediate release tablet 5 mg Cannot be calculated   simvastatin (Zocor) tablet 40 mg Cannot be calculated   sodium chloride 0.9% infusion 20.83 mL   valsartan 320 mg, hydroCHLOROthiazide 25 mg for Diovan HCT Cannot be calculated   famotidine PF (Pepcid) injection 20 mg 20 mg   metoclopramide (Reglan) injection 10 mg 10 mg   nitroglycerin (Tommie-Bid) 2 % ointment 0.5 inch 0.5 inch    ondansetron (Zofran) injection 4 mg 4 mg   sodium citrate-citric acid (Bicitra) solution 30 mL 30 mL              Anesthesia Record               Intraprocedure I/O Totals          Intake    Tranexamic Acid 0.00 mL    The total shown is the total volume documented since Anesthesia Start was filed.    Total Intake 0 mL          Specimen: No specimens collected     Staff:   Circulator: Rita Ornelas RN  Scrub Person: Diane Ganbrandy         Drains and/or Catheters: * None in log *    Tourniquet Times:         Implants:  Implants       Type Name Action Serial No.      Joint Hip NAIL, TFN ADV SHORT, 235MM,  RICHI 11, 130 DEG, RT - [id number] Implanted      Screw SCREW, TFNA, 90MM, STERILE - [id number] Implanted      Screw SCREW, TFNA, 85MM, STERILE - [id number] Implanted      Screw SCREW, LOCKING 5 X 36MM TI - [id number] Implanted             Physician Narrative  Preoperative Diagnosis: Right intertrochanteric femur fracture.  Postoperative Diagnosis: Right intertrochanteric femur fracture.  Procedure: Right hip fracture open reduction internal fixation  Implants: Per above record    Findings  Right intertrochanteric femur fracture as seen on preoperative imaging.    Preoperative Course  Please see clinic/consult notes for full discussion of history, indications, and treatment alternatives. Briefly, the patient had sustained a right intertrochanteric femur fracture. We had an extensive discussion regarding conservative and surgical options, and the patient elected to proceed with surgery.    Procedure  A first assistant actively participated and was necessary for one or more of the following: opening, exposure and visualization during the case, maintaining hemostasis, wound closure resulting in its safe and expeditious completion. I was present for the entire procedure.    The patient was identified in the preoperative area. The operative site was marked, informed consent reviewed, and operative procedure confirmed. The  patient was taken to the operating room and anesthesia achieved. The patient was positioned on the surgical table with bony prominences well-padded. Portable fluoroscopy was used to reduce the fracture. The operative extremity was sterilely prepped and draped. A surgical time-out was performed to confirm the patient, site, procedure, and administration of TXA and prophylactic antibiotics. New gloves were donned after draping and prior to closure, at which time a fresh closing tray with clean instruments was used. An additional dose of TXA was given when closing the wound.    A 3cm longitudinal incision was made proximal and posterior to the greater trochanter and carried down through the fascia. Curved Painting scissors were used to bluntly dissect to the greater trochanter. Using fluoroscopy, a guide pin was inserted into the tip of the greater trochanter on AP view and slightly anterior to midpoint on lateral view. The opening reamer was used, and a long ball-tipped guidewire was inserted down the medullary canal. Based on radiographic measurements, reaming was not necessary for this length nail.    The intramedullary nail was inserted over the guidewire, which was then removed. The trochar was inserted into the jig, a small incision was made in lateral thigh, and the trochar was inserted down to lateral femoral cortex. Using fluoroscopy, a threaded guidewire was inserted through nail and into femoral head, centered on lateral view and slightly inferior to center on AP view.    The measuring device was used to determine a suitable lag screw length. Because of the nearly basicervical nature of part of the fracture pattern, I was concerned that the proximal fragment may rotate when reaming and inserting the lag screw. As a result, I used the aiming jig's built in anterior and posterior guides to place anti-rotational K wires. The guide pin was overdrilled with a reamer. An 85mm lag screw was inserted, found to be too  short, and replaced with a 90mm lag screw. The anti-rotational K wires were removed. Compression was performed across the fracture site using the jig, and the set screw was tightened in a locked position. Position and reduction were verified on AP and lateral imaging.     A small lateral incision was made, and a distal cross lock screw was drilled, measured, and placed through the nail's distal hole, which is dynamic.    Final imaging confirmed appropriate hardware position and reduction. The aiming/insertion arm was removed, and final imaging was obtained to confirm reduction and implant position. All wounds were irrigated with dilute betadine then sterile saline. Closure was performed with interrupted #1 braided absorbable suture for the IT band, interrupted 2-0 braided absorbable suture for the deep dermal layer, and 3-0 nylon vertical mattress.    A sterile dressing and thigh high WILLIAMS hose were applied. Anesthesia was concluded. The patient was transferred to the stretcher and recovery room in stable condition.     Information and Plan  Nerve block: Not available at facility  Complications: None apparent  Weightbearing status: As tolerated  Anticoagulation: Per medicine primary. Recommend 6wks lovenox  Activity restrictions: None  Disposition: To floor

## 2024-02-19 NOTE — CONSULTS
History Of Present Illness  Kathy Pappas is a 83 y.o. female ?-handed, admitted to [facility] on 2/17/24 presenting with fall and hip fracture. Neurology consulted for seizure then cardiac arrest.    Inpatient consult to Neurology  Consult performed by: Woody Miller MD  Consult ordered by: Dimitri Claros MD      Listed history of diabetes, hypertension, dyslipidemia.    Patient seen this afternoon in the ICU, 2 grandchildren (both nurses) at the bedside.    Patient normally lives at home.  On 2/17/2023, while coming downstairs, apparently missed last step of stairs at home and fell on her right side.  Patient reported she hit her forehead on the floor.  With this fall, she was found to have a right femoral fracture.  She was also found to have severe hypomagnesemia, supplemented.  She was admitted to [facility], and had ORIF done 2/18/2024 for right proximal femur comminuted fracture.  Patient was documented by nurse postop that she was having hallucinations upon returning from PACU.  She was pulling IV lines.  Patient received a dose of olanzapine IM, given at 2313h.  Patient was repositioned about 0115h, after which patient began to have a seizure.  Patient stopped breathing and became pulseless.  Per nocturnist documentation, CODE BLUE was activated 1:20 AM on 2/19/2024.  No mention of seizure.  Patient was confused, then became unresponsive.  She maintained good saturation on room air.  Rapid response was activated but patient lost pulse and CPR was initiated immediately.  ACLS was started, and patient was intubated.  She had early loss and rhythm was sinus tachycardia.  Patient reportedly started to move her limbs and open her eyes about 20 minutes after.  Documented downtime was about 7 minutes.  CT scan of the head done did not show any acute intracranial abnormalities.  Neurology was consulted.  No hypothermia protocol was performed.  Patient subsequently extubated 2/19/2024.    At the time of me seeing  patient, patient appears to be delirious.  She was grabbing at the clothes of the nurses helping change her sheets.  Per grandchildren, there were some signs of starting to have memory problems, but not bad.  Patient has no known diagnosis of dementia.  Has had no history of stroke or seizure.    After a few minutes, patient appears to be redirectable, and will answer some questions and follow simple commands.  Patient tells me history of falls.  She could not really tell me why, however grandchildren denied any history of LOC or syncope.  It appears that many if not most of her falls were due to tripping.      Review of Systems   Constitutional:  Negative for appetite change, chills and fever.   HENT:  Negative for ear pain and nosebleeds.    Eyes:  Negative for discharge and itching.   Respiratory:  Negative for choking and chest tightness.    Cardiovascular:  Negative for chest pain and palpitations.   Gastrointestinal:  Negative for abdominal distention, abdominal pain and nausea.   Endocrine: Negative for cold intolerance and heat intolerance.   Genitourinary:  Negative for difficulty urinating and dysuria.   Musculoskeletal:  Negative for gait problem and myalgias.   Neurological:  Negative for dizziness, and numbness.     Past Medical History  Past Medical History:   Diagnosis Date    Anemia     Diabetes mellitus (CMS/[facility])     Hyperlipidemia     Hypertension     Renal insufficiency        Surgical History  History reviewed. No pertinent surgical history.    Social History  Social History     Tobacco Use    Smoking status: Never    Smokeless tobacco: Never   Substance Use Topics    Alcohol use: Never    Drug use: Never       Home Meds  Medications Prior to Admission   Medication Sig Dispense Refill Last Dose    cholecalciferol (Vitamin D-3) 50 MCG (2000 UT) tablet Take 1 tablet (2,000 Units) by mouth once daily.       cyanocobalamin (Vitamin B-12) 500 mcg tablet Take 1 tablet (500 mcg) by mouth once daily.     "   ferrous sulfate 325 (65 Fe) MG EC tablet Take 1 tablet by mouth every other day.       ibandronate (Boniva) 150 mg tablet Take 1 tablet (150 mg) by mouth every 30 (thirty) days. 90 tablet 1     lisinopril 20 mg tablet Take 1 tablet (20 mg) by mouth once daily.       metFORMIN (Glucophage) 850 mg tablet Take 1 tablet (850 mg) by mouth 2 times a day with meals. 180 tablet 3     metoprolol succinate XL (Toprol-XL) 25 mg 24 hr tablet Take 1 tablet (25 mg) by mouth once daily. 90 tablet 1     simvastatin (Zocor) 40 mg tablet Take 1 tablet (40 mg) by mouth once daily at bedtime.       valsartan-hydrochlorothiazide (Diovan-HCT) 320-25 mg tablet Take 1 tablet by mouth once daily.          Allergies  Zyprexa [olanzapine]    Current Meds  Scheduled medications  ceFAZolin, 2 g, intravenous, q8h  docusate sodium, 100 mg, oral, BID  enoxaparin, 30 mg, subcutaneous, q24h  insulin lispro, 0-10 Units, subcutaneous, TID with meals  metoprolol succinate XL, 25 mg, oral, Daily  [Held by provider] simvastatin, 40 mg, oral, Nightly  valsartan 320 mg, hydroCHLOROthiazide 25 mg for Diovan HCT, , oral, Daily      Continuous medications  dextrose 5%-0.45 % sodium chloride, 50 mL/hr      PRN medications  PRN medications: acetaminophen **OR** acetaminophen **OR** acetaminophen, dextromethorphan-guaifenesin, dextrose 10 % in water (D10W), dextrose, glucagon, guaiFENesin, haloperidol lactate, HYDROmorphone, LORazepam, ondansetron **OR** ondansetron, oxyCODONE, oxygen    Last Recorded Vitals  Blood pressure 144/60, pulse 87, temperature 37.4 °C (99.3 °F), resp. rate 16, height 1.6 m (5' 2.99\"), weight 59.4 kg (131 lb), SpO2 96 %.    Awake, alert, oriented, patient able to name one of her grandchildren at the bedside, in no distress  Patient appears to be confused at times, but redirectable and able to answer simple questions and follow simple commands  Well-nourished, appears stated age, in bed No leg edema    Mental status exam as above "   Fair fund of knowledge  Recent/remote memory fair  Fair attention span  Pupils round reactive to light, 3-4 mm, (-) RAPD   Fundoscopic examination was attempted but fundus was not visualized bilaterally   Full EOMs intact, no nystagmus, no ptosis   V1 to V3 sensation is intact   No facial droop   Hearing grossly intact   No dysarthria  Good shoulder shrug bilaterally   Tongue is midline     Motor strength is 5/5 on all extremities, tone/bulk normal   Reflexes 1+ on BUE, trace on BLE, mute toes bilaterally   Sensation is intact to light touch, vibration on all 4 extremities   Finger to nose test intact bilaterally   Unable to have her stand or walk    Relevant Results  I have personally reviewed the following:    Labs  Results for orders placed or performed during the hospital encounter of 02/17/24 (from the past 24 hour(s))   POCT GLUCOSE   Result Value Ref Range    POCT Glucose 234 (H) 74 - 99 mg/dL   POCT GLUCOSE   Result Value Ref Range    POCT Glucose 257 (H) 74 - 99 mg/dL   CBC   Result Value Ref Range    WBC 9.1 4.4 - 11.3 x10*3/uL    nRBC 0.0 0.0 - 0.0 /100 WBCs    RBC 2.29 (L) 4.00 - 5.20 x10*6/uL    Hemoglobin 7.1 (L) 12.0 - 16.0 g/dL    Hematocrit 22.6 (L) 36.0 - 46.0 %    MCV 99 80 - 100 fL    MCH 31.0 26.0 - 34.0 pg    MCHC 31.4 (L) 32.0 - 36.0 g/dL    RDW 12.8 11.5 - 14.5 %    Platelets 212 150 - 450 x10*3/uL   Comprehensive Metabolic Panel   Result Value Ref Range    Glucose 221 (H) 74 - 99 mg/dL    Sodium 138 136 - 145 mmol/L    Potassium 3.7 3.5 - 5.3 mmol/L    Chloride 104 98 - 107 mmol/L    Bicarbonate 17 (L) 21 - 32 mmol/L    Anion Gap 21 (H) 10 - 20 mmol/L    Urea Nitrogen 29 (H) 6 - 23 mg/dL    Creatinine 1.85 (H) 0.50 - 1.05 mg/dL    eGFR 27 (L) >60 mL/min/1.73m*2    Calcium 7.2 (L) 8.6 - 10.3 mg/dL    Albumin 3.1 (L) 3.4 - 5.0 g/dL    Alkaline Phosphatase 52 33 - 136 U/L    Total Protein 5.2 (L) 6.4 - 8.2 g/dL     (H) 9 - 39 U/L    Bilirubin, Total 0.5 0.0 - 1.2 mg/dL     (H) 7  - 45 U/L   Troponin I, High Sensitivity   Result Value Ref Range    Troponin I, High Sensitivity 26 (H) 0 - 13 ng/L   Magnesium   Result Value Ref Range    Magnesium 1.55 (L) 1.60 - 2.40 mg/dL   Blood Gas Arterial Full Panel   Result Value Ref Range    POCT pH, Arterial 7.28 (L) 7.38 - 7.42 pH    POCT pCO2, Arterial 46 (H) 38 - 42 mm Hg    POCT pO2, Arterial 29 (LL) 85 - 95 mm Hg    POCT SO2, Arterial 34 (L) 94 - 100 %    POCT Oxy Hemoglobin, Arterial 33.6 (L) 94.0 - 98.0 %    POCT Hematocrit Calculated, Arterial 20.0 (L) 36.0 - 46.0 %    POCT Sodium, Arterial 135 (L) 136 - 145 mmol/L    POCT Potassium, Arterial 4.0 3.5 - 5.3 mmol/L    POCT Chloride, Arterial 106 98 - 107 mmol/L    POCT Ionized Calcium, Arterial 0.99 (L) 1.10 - 1.33 mmol/L    POCT Glucose, Arterial 243 (H) 74 - 99 mg/dL    POCT Lactate, Arterial 5.5 (HH) 0.4 - 2.0 mmol/L    POCT Base Excess, Arterial -4.8 (L) -2.0 - 3.0 mmol/L    POCT HCO3 Calculated, Arterial 21.6 (L) 22.0 - 26.0 mmol/L    POCT Hemoglobin, Arterial 6.6 (L) 12.0 - 16.0 g/dL    POCT Anion Gap, Arterial 11 10 - 25 mmo/L    Patient Temperature 37.0 degrees Celsius    FiO2 100 %   Prepare RBC: 1 Units   Result Value Ref Range    PRODUCT CODE [id number]     Unit Number [id number]     Unit ABO O     Unit RH POS     XM INTEP COMP     Dispense Status XM     Blood Expiration Date March 15, 2024 23:59 EDT     PRODUCT BLOOD TYPE 5100     UNIT VOLUME 350    POCT GLUCOSE   Result Value Ref Range    POCT Glucose 161 (H) 74 - 99 mg/dL   Blood Gas Lactic Acid, Venous   Result Value Ref Range    POCT Lactate, Venous 1.8 0.4 - 2.0 mmol/L   Blood Gas Venous Full Panel   Result Value Ref Range    POCT pH, Venous 7.33 7.33 - 7.43 pH    POCT pCO2, Venous 56 (H) 41 - 51 mm Hg    POCT pO2, Venous 35 35 - 45 mm Hg    POCT SO2, Venous 50 45 - 75 %    POCT Oxy Hemoglobin, Venous 49.4 45.0 - 75.0 %    POCT Hematocrit Calculated, Venous 24.0 (L) 36.0 - 46.0 %    POCT Sodium, Venous 137 136 - 145 mmol/L     POCT Potassium, Venous 4.0 3.5 - 5.3 mmol/L    POCT Chloride, Venous 106 98 - 107 mmol/L    POCT Ionized Calicum, Venous 1.04 (L) 1.10 - 1.33 mmol/L    POCT Glucose, Venous 177 (H) 74 - 99 mg/dL    POCT Lactate, Venous 1.8 0.4 - 2.0 mmol/L    POCT Base Excess, Venous 3.0 -2.0 - 3.0 mmol/L    POCT HCO3 Calculated, Venous 29.5 (H) 22.0 - 26.0 mmol/L    POCT Hemoglobin, Venous 7.9 (L) 12.0 - 16.0 g/dL    POCT Anion Gap, Venous 6.0 (L) 10.0 - 25.0 mmol/L    Patient Temperature 37.0 degrees Celsius    FiO2 45 %   POCT GLUCOSE   Result Value Ref Range    POCT Glucose 143 (H) 74 - 99 mg/dL   POCT GLUCOSE   Result Value Ref Range    POCT Glucose 96 74 - 99 mg/dL       Imaging results  No MRI head results found for the past 12 months   CT head wo IV contrast    Result Date: 2/17/2024  Interpreted By:  Baltazar Chapa, STUDY: CT HEAD WO IV CONTRAST; CT CERVICAL SPINE WO IV CONTRAST;  2/17/2024 9:32 pm   INDICATION: Signs/Symptoms:fall. Altered mental status   COMPARISON: No prior cross-sectional imaging is available for comparison.   ACCESSION NUMBER(S): [id number]; [id number]   ORDERING CLINICIAN: OLIVER RICHARDS   TECHNIQUE: Axial noncontrast CT images of the head and cervical spine.   FINDINGS: BRAIN PARENCHYMA: Gray-white matter interfaces are preserved. No mass, mass effect or midline shift. There are mild periventricular white matter hypodensities suggestive of mild chronic microvascular ischemic change.   HEMORRHAGE: No acute intracranial hemorrhage. VENTRICLES and EXTRA-AXIAL SPACES: Normal size for age. EXTRACRANIAL SOFT TISSUES: Unremarkable. CALVARIUM: No depressed calvarial fracture.   SOFT TISSUES: Within normal limits. PARANASAL SINUSES: No hemorrhage in the paranasal sinuses. There is moderate polypoid mucosal thickening of the left maxillary sinus. MASTOIDS: Within normal limits. ORBITS: Grossly normal.   ALIGNMENT: Normal cervical lordosis. VERTEBRAE: No acute fracture. Cervical vertebral body heights  "are maintained. There are mild age-indeterminate compression deformities of the superior endplates of T1 and T2 vertebral bodies with associated superior endplate Schmorl nodes. There are no suspicious osseous lesions. Mild osteopenia. Mild degenerative disc space narrowing and endplate reactive change at C4-C5. SPINAL CANAL: No significant spinal canal stenosis. PREVERTEBRAL SOFT TISSUES: No prevertebral soft tissue swelling. LUNG APICES: Imaged portion of the lung apices are within normal limits.   OTHER FINDINGS: None.         No acute intracranial abnormality.   No evidence of cervical spine fracture or traumatic malalignment. Mild age-indeterminate compression deformities of the superior endplates of T1 and T2. Correlation with point tenderness recommended.   Signed by: Baltazar Chapa 2/17/2024 9:39 PM Dictation workstation:   [id number]      Assessment/Plan  Mechanical fall 2/17/24-->s/p ORIF R prox comminuted femoral fracture 2/18/24--> had what appears to be postoperative delirium, then had a reported seizure then cardiac arrest.    1.  Seizure  2.  S/p cardiac arrest 2/19/24  Of note, no other information is available about this suppose it \"seizure\" that she had  The nurse documented a seizure, but the nocturnist did not  Ddx for postoperative seizure includes: Stroke (? Fat embolism vs other), anesthesia complication, medication side effect (? Olanzapine), versus other  It should be noted that her lab work on 2/18/2024 either had normal or decent numbers    It is possible that patient may have had a reaction to olanzapine--which was the last medication I am aware of that she received.  Discussed, there is no way for me to prove or disprove this relationship.  Discussed, will add this as \"allergy\".    3.  R prox femoral fracture, s/p ORIF 2/18/24  4.  Fall, mechanical 2/17/24    5.  Hx falls  ? Etiology--thinks tripping  NO LOC    Prognosis for cognitive improvement somewhat guarded, but appears to be " improving, as expected for delirium. Whether or not there is structural problem (eg new stroke post-op) is not quite evident in head CT done.    Recommendations:  No strong indication for AED-- ? Provoked sz  Seizure/delirium precautions  Optimize medical situation as able  Continue supportive care  Do EEG  Follow-up head CT wo repeated today  Consider MRI brain wo when able  PT eval when appropriate    Discussed with pt/grandchildren.    All questions answered. Please call with questions.    Woody Miller MD  2/19/2024  4:55 PM

## 2024-02-19 NOTE — ANESTHESIA POSTPROCEDURE EVALUATION
Patient: Kathy Pappas    Procedure Summary       Date: 02/18/24 Room / Location: [facility] OR 06 / Virtual [facility] OR    Anesthesia Start: 1731 Anesthesia Stop: 2024    Procedure: Hip fracture fixation (Right: Hip) Diagnosis:       Fracture of right hip, closed, initial encounter (CMS/[facility])      (Fracture of right hip, closed, initial encounter (CMS/[facility]) [S72.001A])    Surgeons: Alistair Castillo MD Responsible Provider: SHARON Bajwa    Anesthesia Type: general ASA Status: 3 - Emergent            Anesthesia Type: general    Vitals Value Taken Time   /71 02/18/24 2057   Temp 36.6 °C (97.8 °F) 02/18/24 2018   Pulse 77 02/18/24 2057   Resp 9 02/18/24 2057   SpO2 100 % 02/18/24 2057   Vitals shown include unvalidated device data.    Anesthesia Post Evaluation    Patient location during evaluation: PACU  Patient participation: complete - patient participated  Level of consciousness: awake and alert  Pain score: 0  Pain management: adequate  Airway patency: patent  Cardiovascular status: acceptable  Respiratory status: acceptable and nasal cannula  Hydration status: acceptable  Postoperative Nausea and Vomiting: none    There were no known notable events for this encounter.

## 2024-02-19 NOTE — SIGNIFICANT EVENT
CODE BLUE was activated on this patient at 1:20 AM on 2/19/2024.    As per the bedside nurse, patient was getting confused, and became unresponsive.  Her vitals before the code were generally stable.  She was maintaining good saturation on room air.  Patient did have orthopedic surgery during the day for right femoral fracture.     Rapid response code was activated however patient had no pulse so CPR was initiated immediately.    4 rounds of CPR were conducted.  Patient was given epinephrine total of 4 cc.  Patient was also given 1 ampoule of bicarb push.  ROSC was achieved after    Patient was intubated during the code.  Blood glucose level was at obtained was 257.     The rhythm after early ROSC was sinus tachycardia.  Patient was given IV fluid after the code.  Patient was moved to the ICU.  Blood pressure was elevated after the code.  Heart rate was up to 140 initially and now down to 120 range    Family were contacted and notified about the code.    Labs ordered including CBC, CMP, troponin, magnesium, and ABG  Will obtain CT head once patient is stable.  For sedation we will use propofol and fentanyl as needed.  Patient started to move her limbs and opening her eyes 20 minutes after the code.  Chest x-ray is obtained.  ET tube seems in satisfactory position.  Pending radiology read  OG tube was placed and seems in proper position.  Patient is currently on the ventilator, will use lung protective settings.  Pantoprazole for GI prophylaxis  SCD for DVT prophylaxis.  Will communicate with orthopedics to start DVT prophylaxis.

## 2024-02-19 NOTE — NURSING NOTE
Rapid responds called at 0119.  No pulse, Code blue called at 0120,  Compressions started.  Pulse check, carotid, no pulse at 0122.  Epinephrine 2cc given at 0123.  Pulse check, carotid, no pulse at 0124.  Epinephrine 2cc given at 0125.  Giorgi mechanical compressions started 0125.  Sodium Bicarbonate 50meq/ml given at 0127.  Pulse check, pulse to carotid and femoral palpable at 0127.  Intubated at 0128 7.5 ET tube at 23cm at teeth with color, CO2 entitle 44 at 0128. Bilateral breath sounds clear.  Blood sugar 257, blood pressure 196/77 HR on monitor 166 at 0130.  Normal saline bolus hung at 0132.  IV access started to left arm 20 gauge, left leg 20 gauge, 0133.  Blood pressure 183/76  on monitor 0134.    Off unit to ICU at 0136.

## 2024-02-19 NOTE — PROGRESS NOTES
S: Intubated in ICU.  Awake and appears to be very aggravated from tube, team considering extubating soon.  Daughter at bedside, pleasant and appreciative of care.  Overnight, a code was called at 1:20 AM when the patient became confused then unresponsive.  She was found to be without a pulse and was revived with CPR as well as intubated during the code.  20 minutes later, the patient started to move her limbs and open her eyes.  ICU team is currently working up etiology, as she seemed to be doing fine before hand.          O  VS:  Temp:  [36.4 °C (97.6 °F)-37.3 °C (99.2 °F)] 37.2 °C (99 °F)  Heart Rate:  [] 109  Resp:  [14-24] 22  BP: (131-197)/() 131/60  FiO2 (%):  [100 %] 100 %    Gen: NAD  Focused exam of operative extremity:  -Proximal dressing minor stains, distal dressing with moderate saturation.  -Sensation: Unable to formally test, but actively moving foot around to touch  -Motor: Unable to formally test, but actively moving leg at ankle, knee, hip  -Palpable DP pulse, symmetric to contralateral  -SCDs in place    Drain  Na  Micro  Na  PT/OT  Pending  Labs (pertinent)  Hgb 7.1, currently being transfused 1u pRBC  Imaging  No new imaging today        A&P: Kathy Pappas is a 83 y.o. female s/p open reduction internal fixation of right proximal femur fracture on 2/18/2024.  Appreciate care from internal medicine  and ICU primary team  Abx prophylaxis: 24hr periop  Analgesia: Multimodal with breakthrough   Consults: Appreciate therapy, ASHLEY, case mgmt  Dressing: Aquacel/Mepilex for 7d. Patient will remove at home then apply daily dry dressing PRN  DVT ppx: Early mobilization, SCDs, pharmacologic (prefer LVX 6wks)  BLE swelling: Compression hose  Remainder of care pending stabilization in ICU  Activity: WBAT  Diet: Routine. DC IVF when adequate PO intake  DC: Pending ICU stabilization. F/u with me 2wks after DC.  Daughter verbalized understanding. All questions answered.

## 2024-02-19 NOTE — SIGNIFICANT EVENT
Pt very anxious, pulling at lines and tubes, and trying to get out of bed since returning from surgery at approx 2100.  Staff at bedside since pt return due to anxiety and agitation.  Notified Mee Kim of agitation and rec'd order for Zyprexa IM.  Zyprexa given at 2313.  Pt continued to pull at dressings and try to get out of bed.  Placed new IV at 0045 due to previous IV bloody and leaking.  Approx. 0115, repositioned pt, and pt began to seize.  RRT called at 0119.  Pt stopped breathing and became pulseless.  Code Blue called at 0120 and compressions started.  Pt intubated and Giorgi applied.  After ROSC, pt transported to ICU accompanied by nurses and RT performing bag respirations.  Family notified by provider at bedside.

## 2024-02-19 NOTE — CARE PLAN
The patient's goals for the shift include safety    The clinical goals for the shift include safety    Problem: Discharge Planning  Goal: Discharge to home or other facility with appropriate resources  Outcome: Progressing     Problem: Chronic Conditions and Co-morbidities  Goal: Patient's chronic conditions and co-morbidity symptoms are monitored and maintained or improved  Outcome: Progressing     Problem: Fall/Injury  Goal: Not fall by end of shift  Outcome: Progressing  Goal: Be free from injury by end of the shift  Outcome: Progressing  Goal: Verbalize understanding of personal risk factors for fall in the hospital  Outcome: Progressing  Goal: Verbalize understanding of risk factor reduction measures to prevent injury from fall in the home  Outcome: Progressing  Goal: Use assistive devices by end of the shift  Outcome: Progressing  Goal: Pace activities to prevent fatigue by end of the shift  Outcome: Progressing     Problem: Pain  Goal: Takes deep breaths with improved pain control throughout the shift  Outcome: Progressing  Goal: Turns in bed with improved pain control throughout the shift  Outcome: Progressing  Goal: Walks with improved pain control throughout the shift  Outcome: Progressing  Goal: Performs ADL's with improved pain control throughout shift  Outcome: Progressing  Goal: Participates in PT with improved pain control throughout the shift  Outcome: Progressing  Goal: Free from opioid side effects throughout the shift  Outcome: Progressing  Goal: Free from acute confusion related to pain meds throughout the shift  Outcome: Progressing     Problem: Diabetes  Goal: Achieve decreasing blood glucose levels by end of shift  Outcome: Progressing  Goal: Increase stability of blood glucose readings by end of shift  Outcome: Progressing  Goal: Decrease in ketones present in urine by end of shift  Outcome: Progressing  Goal: Maintain electrolyte levels within acceptable range throughout shift  Outcome:  Progressing  Goal: Maintain glucose levels >70mg/dl to <250mg/dl throughout shift  Outcome: Progressing  Goal: No changes in neurological exam by end of shift  Outcome: Progressing  Goal: Learn about and adhere to nutrition recommendations by end of shift  Outcome: Progressing  Goal: Vital signs within normal range for age by end of shift  Outcome: Progressing  Goal: Increase self care and/or family involovement by end of shift  Outcome: Progressing  Goal: Receive DSME education by end of shift  Outcome: Progressing

## 2024-02-20 NOTE — PROGRESS NOTES
Call placed to daughter as patient is still somewhat confused, patient had hip surgery, she is still pending PT OT to see, she will require SNF placement. [facility] list printed, [facility] is #1 choice. Will have [facility] send referral. Awaiting acceptance. Patient will require auth.    [facility] can accept. Will need auth

## 2024-02-20 NOTE — CARE PLAN
EEG final report reviewed. Normal awake and drowsy EEG. No epileptiform discharges or lateralizing signs.     No change to rest of neuro recommendations from today.

## 2024-02-20 NOTE — PROGRESS NOTES
Physical Therapy    Physical Therapy Evaluation    Patient Name: Kathy Pappas  MRN: [id number]  Today's Date: 2/20/2024   Time Calculation  Start Time: 1409  Stop Time: 1432  Time Calculation (min): 23 min    Assessment/Plan   PT Assessment  PT Assessment Results: Decreased strength, Decreased endurance, Impaired balance, Decreased mobility, Decreased coordination, Impaired judgement, Decreased safety awareness, Impaired hearing, Pain  Rehab Prognosis: Fair  Evaluation/Treatment Tolerance: Patient limited by fatigue, Patient limited by pain  End of Session Communication: Bedside nurse  Assessment Comment:  (2 A FOR BED MOBITY AND TO TAKE A FEW STEPS USING DWW WBAT RLE AT BEDSIDE, LEGSUNSTEADY HIGH FALLRISK, WILL NEED SKILLED REHAB ON DISCH)  End of Session Patient Position: Bed, 3 rail up, Alarm on (CALL LIGHT IN REACH FAMLIY PRESENT)  IP OR SWING BED PT PLAN  Inpatient or Swing Bed: Inpatient  PT Plan  Treatment/Interventions: Bed mobility, Transfer training, Gait training, Strengthening, Endurance training  PT Plan: Skilled PT  PT Frequency: 5 times per week (1-2X/DAY)  PT Discharge Recommendations: Moderate intensity level of continued care  Equipment Recommended upon Discharge:  (TBD)  PT Recommended Transfer Status: Assist x2 (FWW, WBAT RLE)  PT - OK to Discharge: Yes (WHEN MEDICALLY CLEARED)      Subjective   General Visit Information:  General  Reason for Referral: RECENT SURG, IMPAIRED MOBILITY  Referred By: MONISHA  Past Medical History Relevant to Rehab: FELL INJURING R HIP; DX: FX R HIP, ORIF 2/18, RAPID RESPONSE 2/19- CPR DONE, TO ICU; HX: DM, HTN, OSTEOPOROSIS, HT, OA, ANXIETY, FALLS, CKD  Family/Caregiver Present: Yes  Caregiver Feedback: DAUGHTER GIVES HOME INFO  Co-Treatment: OT  Co-Treatment Reason: FACILITATE MOBILITY SAFETY  Prior to Session Communication: Bedside nurse  Patient Position Received: Bed, 3 rail up, Alarm on (ROOM 2031 SLEEPY, FAMILY PRESENT OK PER RN TO SEE FOR MOBILITY, IV  ANISA)  General Comment: PER JOLYNN SAVAGE ORDERS- WBAT  Home Living:  Home Living  Home Living Comments:  (DAUGHTER, 2 LEVEL HOME 2 [address],0 RAIL,  AMB/ADL INDEP OCCASIONAL HOLDS ONTO FURNITURE W/ AMB, TUB SHOWER-STANDS, DAUGHTER DOES CHORES AND DRIVING)       Precautions:  Precautions  Hearing/Visual Limitations: [place]  LE Weight Bearing Status:  (REL WBAT)  Medical Precautions: Fall precautions         Objective   Pain:  Pain Assessment  Pain Score: 8  Pain Type: Surgical pain (R HIP)  Cognition:  Cognition  Overall Cognitive Status: Impaired  Arousal/Alertness: Delayed responses to stimuli  Orientation Level: Disoriented to situation  Following Commands: Follows one step commands with increased time  Safety Judgment: Decreased awareness of need for safety precautions  Problem Solving: Assistance required to identify errors made  Attention: Exceptions to WFL  Sustained Attention: Impaired (LETHARGIC/SLEEPY)  Memory: Exceptions to WFL  Short-Term Memory: Impaired  Safety/Judgement: Exceptions to WFL  Complex Functional Tasks: Maximal  Novel Situations: Maximal  Routine Tasks: Moderate  Unable to Self-Monitor and Self-Correct Consistently: Moderate  Insight: Moderate  Impulsive: Moderately  Task Initiation: Delayed initiation  Processing Speed: Delayed    General Assessments:  General Observation  General Observation:  (LARGE BRUISES R HIP/THIGH, INCISINO R HIP NOT OBSERVED DUE TO DRESSING; 5 REPS EX IN SUPINE ACTIVIE AP, GS/QS,  MIN A LLE  AND MAX A RLE HS AND HIP ABDUCTION)               Activity Tolerance  Endurance: Tolerates 10 - 20 min exercise with multiple rests    Sensation  Sensation Comment: NO C/O    Strength  Strength Comments:  (ROM NASIMA LEGS WFL, PAINFUL TO MOVE R HIP;  STRENGTH LLE 3+./5, RHIP 2-/5, R KNEE 3-/5 R ANKLE 3/5, DECREASED MUSCULAR ENDURANCE)  Strength  Strength Comments:  (ROM NASIMA LEGS WFL, PAINFUL TO MOVE R HIP;  STRENGTH LLE 3+./5, RHIP 2-/5, R KNEE 3-/5 R ANKLE 3/5, DECREASED MUSCULAR  ENDURANCE)                     Static Sitting Balance  Static Sitting-Comment/Number of Minutes: FAIR  Dynamic Sitting Balance  Dynamic Sitting-Comments: FAIR-    Static Standing Balance  Static Standing-Comment/Number of Minutes: POOR+  Dynamic Standing Balance  Dynamic Standing-Comments: POOR+  Functional Assessments:  Bed Mobility  Bed Mobility:  (SUPNE<>SIT MAX X2, UP IN BED MAX X2, SITS EOB CGA FOR 6 MIN)    Transfers  Transfer:  (SIT<>STAND MOD X2 FWW, VC FOR SAFETY, LEGS UNSTEADY)    Ambulation/Gait Training  Ambulation/Gait Training Performed:  (FWW AMB WBAT RLE MOD X2 AMB 2 FT TO L TOWARDS HOB, SLIDING FEET ON FLOOR, LEGS UNSTEADY, VERY FATIGUED W/ ACTIVITY)  Extremity/Trunk Assessments:        Outcome Measures:  [facility] Basic Mobility  Turning from your back to your side while in a flat bed without using bedrails: Total  Moving from lying on your back to sitting on the side of a flat bed without using bedrails: Total  Moving to and from bed to chair (including a wheelchair): Total  Standing up from a chair using your arms (e.g. wheelchair or bedside chair): Total  To walk in hospital room: Total  Climbing 3-5 steps with railing: Total  Basic Mobility - Total Score: 6    Encounter Problems       Encounter Problems (Active)       Mobility       STG - Patient will ambulate (Not Progressing)       Start:  02/20/24    Expected End:  03/01/24       FWW WBAT RLE, MIN X1 50-75 FT         STRENGTHENING (Not Progressing)       Start:  02/20/24    Expected End:  03/12/24       20+ REPS AROM RLE AND RROM ON LLE INCREASING STRENGTH TO STABILIZE GAIT            Transfers       STG - Patient to transfer to and from sit to supine (Not Progressing)       Start:  02/20/24    Expected End:  02/29/24       MIN A USING RAILS         STG - Patient will transfer sit to and from stand (Not Progressing)       Start:  02/20/24    Expected End:  02/28/24       RLE WBAT FWW, MIN A USING PROPER TECHNIQUE                Education  Documentation  Mobility Training, taught by Kailee Balbuena, PT at 2/20/2024  3:23 PM.  Learner: Family, Patient  Readiness: Acceptance  Method: Explanation  Response: Needs Reinforcement  Comment: FWW AMB POST ORIF RLE SAFETY; ROLE OF IP REHAB IN RECOVERY    Education Comments  No comments found.

## 2024-02-20 NOTE — SIGNIFICANT EVENT
EEG PRELIM result (from fellow) done this afternoon:  Normal  Need to follow-up on final report tomorrow    Dr renee

## 2024-02-20 NOTE — CARE PLAN
Problem: Mobility  Goal: STG - Patient will ambulate  Description: FWW WBAT RLE, MIN X1 50-75 FT  Outcome: Not Progressing  Goal: STRENGTHENING  Description: 20+ REPS AROM RLE AND RROM ON LLE INCREASING STRENGTH TO STABILIZE GAIT  Outcome: Not Progressing     Problem: Transfers  Goal: STG - Patient to transfer to and from sit to supine  Description: MIN A USING RAILS  Outcome: Not Progressing  Goal: STG - Patient will transfer sit to and from stand  Description: RLE WBAT FWW, MIN A USING PROPER TECHNIQUE  Outcome: Not Progressing

## 2024-02-20 NOTE — PROGRESS NOTES
Kathy Pappas is a 83 y.o. female on day 2 of admission presenting with Fall, initial encounter.    Subjective   Kathy Pappas is an 83 y.o. female with history of diabetes, hypertension, and hyperlipidemia presenting with a fall and right hip pain.  She states that she was coming downstairs tonight when she slipped on the penultimate step and fell on her right side. She bruised both knees and hit forehead on the floor.  She was brought to the ED by EMS and hip imaging study yielded a comminuted right basicervical/intertrochanteric femoral fracture. Pt denies having any antecedent or associated lightheadedness, chest pain, palpitations or SOB. There was no LOC. No HA, visual changes, nausea or vomiting. She reports being fairly active and is able to do >4METS. She denies history of structural heart disease including MI.     2/19: After surgery patient became unresponsive and arrested. ROSC was achieved after 4 rounds of CPR/Epi. Seizure active was noted as well. By the time I saw there this AM she was extubated and agitated.     2/20: She's not yet back to her mentation baseline but she is improved from yesterday.          Objective     Constitutional:       General: She is not in acute distress.     Appearance: Normal appearance.   HENT:      Head: Normocephalic and atraumatic.      Nose: Nose normal.      Mouth/Throat:      Mouth: Mucous membranes are dry.      Pharynx: Oropharynx is clear. No oropharyngeal exudate or posterior oropharyngeal erythema.   Eyes:      General: No scleral icterus.        Right eye: No discharge.         Left eye: No discharge.      Conjunctiva/sclera: Conjunctivae normal.   Cardiovascular:      Rate and Rhythm: Normal rate and regular rhythm.      Heart sounds: Normal heart sounds. No murmur heard.  Pulmonary:      Breath sounds: Normal breath sounds. No wheezing, rhonchi or rales.   Abdominal:      Palpations: Abdomen is soft. There is no mass.      Tenderness: There is no  "abdominal tenderness. There is no right CVA tenderness, guarding or rebound.   Musculoskeletal:      Cervical back: Neck supple.      Right lower leg: Surgical dressing clean and dry      Left lower leg: No edema.     Lymphadenopathy:      Cervical: No cervical adenopathy.   Skin:     General: Skin is warm and dry.      Capillary Refill: Capillary refill takes less than 2 seconds.      Findings: Bruising present.      Comments: Bruises seen on both knees   Neurological:      General: No focal deficit present. She is delirious.   Psychiatric:        agitated      Last Recorded Vitals  Blood pressure 130/56, pulse 89, temperature 36.7 °C (98.1 °F), temperature source Temporal, resp. rate 13, height 1.6 m (5' 2.99\"), weight 62.5 kg (137 lb 11.2 oz), SpO2 93 %.  Intake/Output last 3 Shifts:  I/O last 3 completed shifts:  In: 1852.3 (31.2 mL/kg) [I.V.:1556.3 (26.2 mL/kg); Blood:286; NG/GT:10]  Out: 550 (9.3 mL/kg) [Urine:550 (0.3 mL/kg/hr)]  Weight: 59.4 kg     Relevant Results                             Assessment/Plan     Cardiac Arrest   -ROSC achieved after 4 rounds of CPR on 2/18  -she is now extubated and awake, she is struggling with delirium but is improving     Seizures   -before cardiac arrest patient was noted to have ?seizure activity   -CTH without acute process   -EEG prelim read normal    -Consult Neuro    Acute Hypoxemic Respiratory Failure   -during cardiac arrest   -Patient is now extubated and on NC    Fall with right hip fracture  -S/P ORIF on 2/18  -plan on Lovenox on DC for 6w for DVT ppx  -WBAT  -Ortho consulting     Hypomagnesemia  -replete PRN     Type II DM  -SSI IP      HTN  -home meds     CKD stage 3  Monitor renal function        Keshav Hubbard MD PhD      "

## 2024-02-20 NOTE — PROGRESS NOTES
Occupational Therapy    Evaluation    Patient Name: Kathy Pappas  MRN: [id number]  Today's Date: 2/20/2024  Time Calculation  Start Time: 1410  Stop Time: 1432  Time Calculation (min): 22 min        Assessment:  OT Assessment: OT eval completed. pt appears below functional baseline. pt current level of assist is MOD to MAX A x2 for mobility and ADLs. pt would benefit from further OT to prevent further functional decline  Prognosis: Fair  Barriers to Discharge: None  Evaluation/Treatment Tolerance: Patient limited by fatigue  End of Session Communication: Bedside nurse  End of Session Patient Position: Bed, 3 rail up, Alarm on (CALL LIGHT IN REACH FAMLIY PRESENT)  OT Assessment Results: Decreased ADL status, Decreased upper extremity strength, Decreased safe judgment during ADL, Decreased cognition, Decreased endurance, Decreased functional mobility  Prognosis: Fair  Barriers to Discharge: None  Evaluation/Treatment Tolerance: Patient limited by fatigue  Strengths: Support of extended family/friends  Barriers to Participation: Insight into problems  Past Medical History:   Diagnosis Date    Anemia     Diabetes mellitus (CMS/[facility])     Hyperlipidemia     Hypertension     Renal insufficiency      History reviewed. No pertinent surgical history.    Plan:  Treatment Interventions: ADL retraining, Functional transfer training, UE strengthening/ROM, Endurance training, Cognitive reorientation, Patient/family training, Equipment evaluation/education, Compensatory technique education  OT Frequency: 4 times per week  Equipment Recommended upon Discharge:  (TBD)  OT Recommended Transfer Status: Moderate assist, Assist of 2  OT - OK to Discharge: Yes (okay to dc to next level of care once medically cleared)  Treatment Interventions: ADL retraining, Functional transfer training, UE strengthening/ROM, Endurance training, Cognitive reorientation, Patient/family training, Equipment evaluation/education, Compensatory technique  education    Subjective   Current Problem:  1. Fall, initial encounter  EEG      2. Closed fracture of right hip, initial encounter (CMS/[facility])        3. Hypomagnesemia        4. Fracture of right hip, closed, initial encounter (CMS/[facility])  Case Request Operating Room: Hip fracture fixation    Case Request Operating Room: Hip fracture fixation      5. Cardiopulmonary arrest (CMS/HCC)  Intubation    Intubation        General:  General  Reason for Referral: Impaired ADLs and functional mobility (Mechanical Fall s/p closed Fracture of Right Hip. ORIF on 2/18; Rapid Response 2/19 - CPR required, to ICU)  Referred By: Haydee  Past Medical History Relevant to Rehab: PMH: DM, HTN, OSTEOPOROSIS, HT, OA, ANXIETY, FALLS, CKD  Family/Caregiver Present: Yes  Caregiver Feedback: Dtr's present , provide PLOF with pt permission  Co-Treatment: PT  Co-Treatment Reason: Co -eval to maximize safety with mobility while focusing on discpline specific goals  Prior to Session Communication: Bedside nurse  Patient Position Received: Bed, 3 rail up, Alarm on (Pt gowned, elliott, lethargic.)  General Comment: Pt agreeableto Therapy. Pt seen in room 2031.  Precautions:  Hearing/Visual Limitations: [place]  LE Weight Bearing Status: Weight Bearing as Tolerated (RLE)  Medical Precautions: Fall precautions  Vital Signs:     Pain:  Pain Assessment  Pain Assessment: 0-10  Pain Score: 8  Pain Type: Surgical pain  Pain Location: Hip  Pain Orientation: Right    Objective   Cognition:  Overall Cognitive Status: Impaired  Arousal/Alertness: Delayed responses to stimuli  Orientation Level: Disoriented to situation  Following Commands: Follows one step commands with increased time  Safety Judgment: Decreased awareness of need for assistance  Problem Solving: Assistance required to identify errors made  Attention: Exceptions to WFL  Sustained Attention: Impaired (lethargic, pt easily aroused with verbal cues)  Memory: Exceptions to WFL  Short-Term Memory:  Impaired  Safety/Judgement: Exceptions to WFL  Complex Functional Tasks: Maximal  Novel Situations: Maximal  Routine Tasks: Moderate  Unable to Self-Monitor and Self-Correct Consistently: Moderate  Insight: Moderate  Impulsive: Moderately  Task Initiation: Delayed initiation  Processing Speed: Delayed           Home Living:  Home Layout: Two level  Home Access: Stairs to enter without rails  Entrance Stairs-Rails:  (2)  Bathroom Shower/Tub: Tub/shower unit  Home Living Comments: Lives with dtr in two level home. Amb no AD, holds onto funiture PRN. Independent ADLs. DTR assist with household chores + transportation.  Prior Function:     IADL History:     ADL:  Eating Assistance: Stand by (Anticipated . Pt very lethargic)  Grooming Assistance: Minimal (Anticipated)  Grooming Deficit: Increased time to complete  Bathing Assistance: Maximal (Anticipated. Underlying limitations lethargic, weakness and RL E pain)  Bathing Deficit: Steadying, Supervision/safety, Increased time to complete   UE Dressing Assistance: Moderate (Simulated during UE AROM screen)  LE Dressing Assistance: Maximal (Anticipated)  Toileting Assistance with Device:  ((+) elliott. Anticipate max a for hygiene and clothing mgmt)  Activity Tolerance:  Endurance: Tolerates 10 - 20 min exercise with multiple rests  Bed Mobility/Transfers: Bed Mobility  Bed Mobility:  (supine<>sit MAX  A x2 cues for sequencing, an use of BUE)    Transfers  Transfer:  (sit <> stnad MOD Ax2)      Ambulation/Gait Training:  Ambulation/Gait Training  Ambulation/Gait Training Performed:  (MOD A x2  with bed support side stepping with FWW to HOB , cues for walker mgmt, wt. shifting and RLE advancement)  Sitting Balance:  Static Sitting Balance  Static Sitting-Comment/Number of Minutes: CGA to maintain EOB up to 6 minutes  Standing Balance:  Static Standing Balance  Static Standing-Balance Support: Bilateral upper extremity supported  Static Standing-Level of Assistance: Moderate  assistance   Modalities:     Vision:Vision - Basic Assessment  Current Vision: Wears glasses all the time  Sensation:  Sensation Comment: no c/o  Strength:  Strength Comments: 3/5 grossly for BUE no formal MMT applied  Perception:     Coordination:      Hand Function:  Gross Grasp: Functional  Coordination: Functional  Extremities:   and LUE   LUE: Within Functional Limits        Outcome Measures:[facility] Daily Activity  Putting on and taking off regular lower body clothing: A lot  Bathing (including washing, rinsing, drying): A lot  Putting on and taking off regular upper body clothing: A lot  Toileting, which includes using toilet, bedpan or urinal: Total  Taking care of personal grooming such as brushing teeth: A lot  Eating Meals: A little  Daily Activity - Total Score: 12        Education Documentation  Body Mechanics, taught by Shaunna Juan OT at 2/20/2024  4:15 PM.  Learner: Patient  Readiness: Acceptance  Method: Demonstration  Response: Needs Reinforcement    Precautions, taught by Shaunna Juan OT at 2/20/2024  4:15 PM.  Learner: Patient  Readiness: Acceptance  Method: Demonstration  Response: Needs Reinforcement    ADL Training, taught by Shaunna Juan OT at 2/20/2024  4:15 PM.  Learner: Patient  Readiness: Acceptance  Method: Demonstration  Response: Needs Reinforcement    Education Comments  No comments found.        OP EDUCATION:       Goals:  Encounter Problems       Encounter Problems (Active)       ADLs       Patient will perform UB and LB bathing  with minimal assist  level of assistance.       Start:  02/20/24    Expected End:  03/05/24            Patient with complete upper body dressing with contact guard assist level of assistance donning and doffing all UE clothes with no adaptive equipment while edge of bed        Start:  02/20/24    Expected End:  03/05/24                       TRANSFERS       Patient will complete sit to stand transfer with minimal assist  level of assistance and front  wheeled walker in order to improve safety and prepare for out of bed mobility.       Start:  02/20/24    Expected End:  03/05/24            OT GOAL 3       Start:  02/20/24    Expected End:  03/05/24

## 2024-02-20 NOTE — PROGRESS NOTES
Critical Care Daily Progress        Subjective   Patient is a 83 y.o. female admitted on 2/17/2024  8:24 PM with the following indication(s) for ICU care: Post Cardiac Arrest.     Interval History: Patient seen and examined at bedside. Extubated yesterday. Agitated and A&Ox3. Hgb has dropped to 7.1 and Cr is rising with baseline of 1.55. Will monitor. EEG to be performed.    Complaints: None    Scheduled Medications:   docusate sodium, 100 mg, oral, BID  enoxaparin, 30 mg, subcutaneous, q24h  insulin lispro, 0-10 Units, subcutaneous, TID with meals  metoprolol succinate XL, 25 mg, oral, Daily  [Held by provider] simvastatin, 40 mg, oral, Nightly  valsartan 320 mg, hydroCHLOROthiazide 25 mg for Diovan HCT, , oral, Daily         Continuous Medications:   dextrose 5%-0.45 % sodium chloride, 50 mL/hr, Last Rate: 50 mL/hr (02/19/24 1716)         PRN Medications:   PRN medications: acetaminophen **OR** acetaminophen **OR** acetaminophen, dextromethorphan-guaifenesin, dextrose 10 % in water (D10W), dextrose, glucagon, guaiFENesin, haloperidol lactate, HYDROmorphone, LORazepam, ondansetron **OR** ondansetron, oxyCODONE, oxygen    Objective   Vitals:  Visit Vitals  /56   Pulse 92   Temp 36.9 °C (98.4 °F)   Resp 17          24hr Min/Max:  Temp  Min: 36.9 °C (98.4 °F)  Max: 37.8 °C (100 °F)  Pulse  Min: 73  Max: 105  BP  Min: 108/65  Max: 157/68  Resp  Min: 12  Max: 27  SpO2  Min: 90 %  Max: 100 %    LDA:  Urethral Catheter (Active)   Placement Date/Time: 02/19/24 0230   Placed by: Jh ALEXANDER  Catheter Balloon Size: 10 mL   Number of days: 1         Vent settings:       Hemodynamic parameters for last 24 hours:       I/O:    Intake/Output Summary (Last 24 hours) at 2/20/2024 0914  Last data filed at 2/20/2024 0700  Gross per 24 hour   Intake 994 ml   Output 400 ml   Net 594 ml       Physical Exam:   Physical Exam  Constitutional:       Appearance: Normal appearance.   HENT:      Mouth/Throat:      Mouth: Mucous membranes  are dry.      Pharynx: Posterior oropharyngeal erythema present.   Eyes:      Extraocular Movements: Extraocular movements intact.      Pupils: Pupils are equal, round, and reactive to light.   Cardiovascular:      Rate and Rhythm: Normal rate and regular rhythm.      Pulses: Normal pulses.      Heart sounds: Normal heart sounds.   Pulmonary:      Effort: Pulmonary effort is normal.      Breath sounds: Normal breath sounds.   Abdominal:      Palpations: Abdomen is soft.   Musculoskeletal:         General: Tenderness and signs of injury present.      Cervical back: Normal range of motion.   Neurological:      Mental Status: She is alert and oriented to person, place, and time.      Comments: Not oriented to place           Lab/Radiology/Diagnostic Review:  Results for orders placed or performed during the hospital encounter of 02/17/24 (from the past 24 hour(s))   Blood Gas Lactic Acid, Venous   Result Value Ref Range    POCT Lactate, Venous 1.8 0.4 - 2.0 mmol/L   Blood Gas Venous Full Panel   Result Value Ref Range    POCT pH, Venous 7.33 7.33 - 7.43 pH    POCT pCO2, Venous 56 (H) 41 - 51 mm Hg    POCT pO2, Venous 35 35 - 45 mm Hg    POCT SO2, Venous 50 45 - 75 %    POCT Oxy Hemoglobin, Venous 49.4 45.0 - 75.0 %    POCT Hematocrit Calculated, Venous 24.0 (L) 36.0 - 46.0 %    POCT Sodium, Venous 137 136 - 145 mmol/L    POCT Potassium, Venous 4.0 3.5 - 5.3 mmol/L    POCT Chloride, Venous 106 98 - 107 mmol/L    POCT Ionized Calicum, Venous 1.04 (L) 1.10 - 1.33 mmol/L    POCT Glucose, Venous 177 (H) 74 - 99 mg/dL    POCT Lactate, Venous 1.8 0.4 - 2.0 mmol/L    POCT Base Excess, Venous 3.0 -2.0 - 3.0 mmol/L    POCT HCO3 Calculated, Venous 29.5 (H) 22.0 - 26.0 mmol/L    POCT Hemoglobin, Venous 7.9 (L) 12.0 - 16.0 g/dL    POCT Anion Gap, Venous 6.0 (L) 10.0 - 25.0 mmol/L    Patient Temperature 37.0 degrees Celsius    FiO2 45 %   POCT GLUCOSE   Result Value Ref Range    POCT Glucose 143 (H) 74 - 99 mg/dL   POCT GLUCOSE    Result Value Ref Range    POCT Glucose 96 74 - 99 mg/dL   POCT GLUCOSE   Result Value Ref Range    POCT Glucose 108 (H) 74 - 99 mg/dL   CBC   Result Value Ref Range    WBC 8.2 4.4 - 11.3 x10*3/uL    nRBC 0.0 0.0 - 0.0 /100 WBCs    RBC 2.40 (L) 4.00 - 5.20 x10*6/uL    Hemoglobin 7.2 (L) 12.0 - 16.0 g/dL    Hematocrit 22.8 (L) 36.0 - 46.0 %    MCV 95 80 - 100 fL    MCH 30.0 26.0 - 34.0 pg    MCHC 31.6 (L) 32.0 - 36.0 g/dL    RDW 14.6 (H) 11.5 - 14.5 %    Platelets 163 150 - 450 x10*3/uL   Basic Metabolic Panel   Result Value Ref Range    Glucose 116 (H) 74 - 99 mg/dL    Sodium 138 136 - 145 mmol/L    Potassium 3.5 3.5 - 5.3 mmol/L    Chloride 105 98 - 107 mmol/L    Bicarbonate 24 21 - 32 mmol/L    Anion Gap 13 10 - 20 mmol/L    Urea Nitrogen 31 (H) 6 - 23 mg/dL    Creatinine 2.16 (H) 0.50 - 1.05 mg/dL    eGFR 22 (L) >60 mL/min/1.73m*2    Calcium 6.9 (L) 8.6 - 10.3 mg/dL   POCT GLUCOSE   Result Value Ref Range    POCT Glucose 119 (H) 74 - 99 mg/dL     CT head wo IV contrast    Result Date: 2/19/2024  Interpreted By:  Julien Odonnell,  and Mela Child STUDY: CT HEAD WO IV CONTRAST;  2/19/2024 3:29 pm   INDICATION: Signs/Symptoms:Post cardiac arrest.   COMPARISON: 02/17/2024   ACCESSION NUMBER(S): [id number]   ORDERING CLINICIAN: NUHA FERRERA   TECHNIQUE: Axial CT images of the head were obtained without intravenous contrast administration.   FINDINGS: There is moderate brain parenchymal volume loss.   There are patchy nonspecific white matter changes within the cerebral hemispheres bilaterally which while nonspecific, given the patient's age, likely represent sequelae of small-vessel ischemic change.   Additional small scattered hypodensities in are again noted within the subinsular regions, basal ganglia, and thalami bilaterally suggesting incidental mildly prominent perivascular spaces and/or small scattered lacunar infarctions.   No hyperdense acute intracranial hemorrhage is noted.   Dystrophic  calcifications are again noted within the bilateral basal ganglia and cerebellar hemispheres stable when compared with the prior study.   There is no midline shift. The suprasellar/basilar cisterns are patent.   Atherosclerotic calcifications are noted along the carotid siphons.   There is lobulated mucosal thickening noted within the inferior maxillary sinuses left greater than right. Minimal mucosal thickening is noted within a few scattered ethmoid air cells.       There is moderate brain parenchymal volume loss.   There are patchy nonspecific white matter changes within the cerebral hemispheres bilaterally which while nonspecific, given the patient's age, likely represent sequelae of small-vessel ischemic change. Additional small scattered hypodensities in are again noted within the subinsular regions, basal ganglia, and thalami bilaterally suggesting incidental mildly prominent perivascular spaces and/or small scattered lacunar infarctions.   I personally reviewed the images/study and I agree with the findings as stated by Mateusz Gloria MD (Radiology Resident). This study was interpreted at [facility], [place], [place].     MACRO: None.   Signed by: Julien Odonnell 2/19/2024 5:12 PM Dictation workstation:   [id number]    ECG 12 lead    Result Date: 2/19/2024  Sinus rhythm Borderline T abnormalities, anterior leads Confirmed by Michael Bloom ([id number]) on 2/19/2024 1:49:43 PM    FL less than 1 hour    Result Date: 2/19/2024  These images are not reportable by radiology and will not be interpreted by  Radiologists.    ECG 12 lead    Result Date: 2/19/2024  Sinus rhythm Low voltage, precordial leads    XR hip right with pelvis when performed 2 or 3 views    Result Date: 2/19/2024  Interpreted By:  Armin Clayton, STUDY: XR HIP RIGHT WITH PELVIS WHEN PERFORMED 2 OR 3 VIEWS; ;  2/18/2024 9:03 pm   INDICATION: Signs/Symptoms:Postop.   COMPARISON: 02/17/2024 radiographs  at 9:14 p.m.   ACCESSION NUMBER(S): [id number]   ORDERING CLINICIAN: BRENT MARTÍNEZ   FINDINGS: The right femoral intertrochanteric fracture has been stabilized using short intramedullary stiven and screw. The fracture fragments are in anatomic alignment. The tip of the screw lies within the femoral head.   The overall bony mineralization is decreased. No other fracture or dislocation of the pelvis or left hip is noted. Medial calcific sclerosis of the arteries is present as seen with diabetes or end stage renal disease.       Right intertrochanteric fracture fixation.     MACRO: None   Signed by: Armin Clayton 2/19/2024 7:37 AM Dictation workstation:   [id number]    XR chest 1 view    Result Date: 2/19/2024  Interpreted By:  Baltazar Chapa, STUDY: XR CHEST 1 VIEW;  2/19/2024 2:19 am   INDICATION: Signs/Symptoms:Intubation.   COMPARISON: None.   ACCESSION NUMBER(S): [id number]   ORDERING CLINICIAN: NUHA FERRERA   FINDINGS:   CARDIOMEDIASTINAL SILHOUETTE: Cardiomediastinal silhouette is normal in size and configuration.   LUNGS/PLEURA: There are no consolidations.There are no pleural effusions. There is no demonstrated pneumothorax.   LINES/TUBES: Interval placement of endotracheal tube terminating 2.5 cm above the aleksandr. Enteric tube courses below the diaphragm and coiled in gastric fundus with its tip pointing toward the gastroesophageal junction.   BONES: No evidence of acute osseous abnormality. There is a chronic left proximal humerus fracture as also visualized on prior radiographs.       1.  No evidence of acute cardiopulmonary process. Lines and tubes as described above.     Signed by: Baltazar Chapa 2/19/2024 2:36 AM Dictation workstation:   [id number]    XR femur right 2+ views    Result Date: 2/18/2024  Interpreted By:  Kris Deleon, STUDY: XR FEMUR RIGHT 2+ VIEWS;  2/18/2024 12:32 pm   INDICATION: Signs/Symptoms:AP and lateral. Ruling out adjacent injury prior to surgery. Low pre-test probability. Thank  you..   COMPARISON: None.   ACCESSION NUMBER(S): [id number]   ORDERING CLINICIAN: BRENT MARTÍNEZ   FINDINGS: Bony structures:  Comminuted impacted basicervical and intertrochanteric fracture with varus angulation. The remaining bony structures are intact.   Joint spaces:  The hip joint space is maintained. There is mild tricompartment arthrosis at the knee, particularly at the medial joint compartment with mild osteophytosis. No definite knee joint effusion.   Soft tissues:  Fairly extensive atherosclerotic calcification of the superficial femoral artery and popliteal artery.   Other:  None significant       Hip fracture.   MACRO: None   Signed by: Kris Deleon 2/18/2024 12:39 PM Dictation workstation:   [id number]    CT head wo IV contrast    Result Date: 2/17/2024  Interpreted By:  Baltazar Chapa, STUDY: CT HEAD WO IV CONTRAST; CT CERVICAL SPINE WO IV CONTRAST;  2/17/2024 9:32 pm   INDICATION: Signs/Symptoms:fall. Altered mental status   COMPARISON: No prior cross-sectional imaging is available for comparison.   ACCESSION NUMBER(S): [id number]; [id number]   ORDERING CLINICIAN: OLIVER RICHARDS   TECHNIQUE: Axial noncontrast CT images of the head and cervical spine.   FINDINGS: BRAIN PARENCHYMA: Gray-white matter interfaces are preserved. No mass, mass effect or midline shift. There are mild periventricular white matter hypodensities suggestive of mild chronic microvascular ischemic change.   HEMORRHAGE: No acute intracranial hemorrhage. VENTRICLES and EXTRA-AXIAL SPACES: Normal size for age. EXTRACRANIAL SOFT TISSUES: Unremarkable. CALVARIUM: No depressed calvarial fracture.   SOFT TISSUES: Within normal limits. PARANASAL SINUSES: No hemorrhage in the paranasal sinuses. There is moderate polypoid mucosal thickening of the left maxillary sinus. MASTOIDS: Within normal limits. ORBITS: Grossly normal.   ALIGNMENT: Normal cervical lordosis. VERTEBRAE: No acute fracture. Cervical vertebral body heights are  maintained. There are mild age-indeterminate compression deformities of the superior endplates of T1 and T2 vertebral bodies with associated superior endplate Schmorl nodes. There are no suspicious osseous lesions. Mild osteopenia. Mild degenerative disc space narrowing and endplate reactive change at C4-C5. SPINAL CANAL: No significant spinal canal stenosis. PREVERTEBRAL SOFT TISSUES: No prevertebral soft tissue swelling. LUNG APICES: Imaged portion of the lung apices are within normal limits.   OTHER FINDINGS: None.         No acute intracranial abnormality.   No evidence of cervical spine fracture or traumatic malalignment. Mild age-indeterminate compression deformities of the superior endplates of T1 and T2. Correlation with point tenderness recommended.   Signed by: Baltazar Chapa 2/17/2024 9:39 PM Dictation workstation:   [id number]    CT cervical spine wo IV contrast    Result Date: 2/17/2024  Interpreted By:  Baltazar Chapa, STUDY: CT HEAD WO IV CONTRAST; CT CERVICAL SPINE WO IV CONTRAST;  2/17/2024 9:32 pm   INDICATION: Signs/Symptoms:fall. Altered mental status   COMPARISON: No prior cross-sectional imaging is available for comparison.   ACCESSION NUMBER(S): [id number]; [id number]   ORDERING CLINICIAN: OLIVER RICHARDS   TECHNIQUE: Axial noncontrast CT images of the head and cervical spine.   FINDINGS: BRAIN PARENCHYMA: Gray-white matter interfaces are preserved. No mass, mass effect or midline shift. There are mild periventricular white matter hypodensities suggestive of mild chronic microvascular ischemic change.   HEMORRHAGE: No acute intracranial hemorrhage. VENTRICLES and EXTRA-AXIAL SPACES: Normal size for age. EXTRACRANIAL SOFT TISSUES: Unremarkable. CALVARIUM: No depressed calvarial fracture.   SOFT TISSUES: Within normal limits. PARANASAL SINUSES: No hemorrhage in the paranasal sinuses. There is moderate polypoid mucosal thickening of the left maxillary sinus. MASTOIDS: Within normal  limits. ORBITS: Grossly normal.   ALIGNMENT: Normal cervical lordosis. VERTEBRAE: No acute fracture. Cervical vertebral body heights are maintained. There are mild age-indeterminate compression deformities of the superior endplates of T1 and T2 vertebral bodies with associated superior endplate Schmorl nodes. There are no suspicious osseous lesions. Mild osteopenia. Mild degenerative disc space narrowing and endplate reactive change at C4-C5. SPINAL CANAL: No significant spinal canal stenosis. PREVERTEBRAL SOFT TISSUES: No prevertebral soft tissue swelling. LUNG APICES: Imaged portion of the lung apices are within normal limits.   OTHER FINDINGS: None.         No acute intracranial abnormality.   No evidence of cervical spine fracture or traumatic malalignment. Mild age-indeterminate compression deformities of the superior endplates of T1 and T2. Correlation with point tenderness recommended.   Signed by: Baltazar Cahpa 2/17/2024 9:39 PM Dictation workstation:   [id number]    XR knee right 1-2 views    Result Date: 2/17/2024  Interpreted By:  Orion Lucas, STUDY: XR KNEE RIGHT 1-2 VIEWS;  2/17/2024 9:24 pm   INDICATION: Signs/Symptoms:fall.   COMPARISON: None   ACCESSION NUMBER(S): [id number]   ORDERING CLINICIAN: OLIVER RICHARDS   FINDINGS: Two views right knee   No acute fracture or dislocation. Moderate-to-severe tricompartmental degenerative changes most pronounced in the medial and patellofemoral compartments with joint space narrowing and marginal osteophytosis. Small joint effusion. Severe vascular calcifications.       1. No acute osseous abnormality identified.       Signed by: Orion Lucas 2/17/2024 9:31 PM Dictation workstation:   [id number]    XR hip right with pelvis when performed 2 or 3 views    Result Date: 2/17/2024  Interpreted By:  Orion Lucas, STUDY: XR HIP RIGHT WITH PELVIS WHEN PERFORMED 2 OR 3 VIEWS;  2/17/2024 9:24 pm   INDICATION: Signs/Symptoms:pain.   COMPARISON: None.    ACCESSION NUMBER(S): [id number]   ORDERING CLINICIAN: OLIVER RICHARDS   FINDINGS: AP view of the pelvis with AP and lateral views of the right hip   Basicervical/intertrochanteric fracture of the right hip with an additional fracture Prolene extending through the base of the lesser trochanter. Mild angulation of the intertrochanteric fracture site and mild displacement of the lesser trochanter. No dislocation. Heavy vascular calcifications in the pelvis and upper thighs.         1. Comminuted right basicervical/intertrochanteric femoral fracture.       Signed by: Orion Lucas 2/17/2024 9:30 PM Dictation workstation:   [id number]      Assessment/Plan   Principal Problem:    Fall, initial encounter  Active Problems:    Fracture of right hip, closed, initial encounter (CMS/[facility])    Kathy Pappas is a 83 y.o. female with a past medical history of Hyperlipidemia, hypertension, Type 2 diabetes, and CKD who is being admitted to the ICU due to suffering a cardiac arrest on 2/19/24 at 2 am. Patient had surgery for a right femoral fracture on 2/18 and per the bedside nurse became confused and unresponsive. 4 rounds of CPR were conducted with a total of 4 ml of epinephrine given. Patient was intubated during the code blue.      Cardiac Arrest   2/19/24: Patient with cardiac arrest between 1 and 2 am with 7 minutes of downtime   1 round of epinephrine pushed   Seizures pre cardiac arrest   CT head with no acute intracranial abnormalities   Patient intubated - successful breathing trial and extubated as of 12 pm    Hemodynamically stable   Alert and oriented   PRN 5 mg Haldol for agitation for 3 days   Neurology consulted   EEG ordered for pre cardiac arrest seizures   No hypothermia protocol due to patient following commands   2/20/24: Hemodynamically stable  A&Ox3 - not oriented to place   PRN Haldol 5 mg for agitation   Will transfer to floor today      2. Acute Respiratory failure requiring intubation   2/19/24:  Intubated during cardiac arrest   VBG showed 7.33 with pCO2 53  Patient passed breathing trial today - extubation today   2/20/24: No oxygen requirement   Extubated yesterday   Will transfer to floor today      3. Right Femoral Fracture   2/19/24: PRN pain management      4. Hypertension   2/19/24: Continue Valsartan-hydrochlorothiazide   25 mg Metoprolol   PRN Hydralazine   2/20/24: Continue current therapy   Hemodynamically stable      5. Type 2 diabetes   2/19/24: Most recent glucose 221   Level 2 lispro sliding scale      6. Elevated Liver Enzymes   2/19/24: Patient on home statin medication   Hold statin until Enzymes WNL      7. Hypomagnesemia  2/19/24: Mg 1.53   Replaced with 2 mg       8.  New onset seizures  2/19/2024: As per the nurse the patient had 2 episodes of seizures last night  Ordered EEG and neurology consult  2/20/24: EEG ordered will await results   Neurology believes most likely secondary to olanzapine - allergy list updated     9. Acute on Chronic Anemia  2/20/24: Hgb 7.2   No signs of bleed   Transfuse if hgb is <7 or active bleed     10. Acute Kidney Injury   2/20/24: Baseline Cr 1.55   Current Cr 2.16 up from 1.85 today   Nephrology consulted     I spent 45 minutes of cumulative critical care time with the patient.  Greater than 50% of that time was spent in the direct collaboration and or coordination of care of the patient.

## 2024-02-20 NOTE — PROGRESS NOTES
S: Pleasant this morning, still a bit confused.  AO x 2 (name, year).  Was extubated yesterday.  Staff reports improved but continued disorientation following extubation.  Neurology was consulted yesterday due to reports of seizure-like activity prior to code 2 nights ago.  Results below.          O  VS:  Temp:  [37.1 °C (98.8 °F)-37.8 °C (100 °F)] 37.4 °C (99.3 °F)  Heart Rate:  [] 99  Resp:  [12-27] 20  BP: ()/(36-92) 139/45  FiO2 (%):  [40 %] 40 %    Gen: NAD  Focused exam of operative extremity:  -Proximal dressing minor stains, distal dressing with moderate saturation (unchanged from yesterday's outline).  -SILT in S/S/SP/DP/T/F distributions  -Able to flex Q, TA, GS  -Palpable DP pulse, symmetric to contralateral  -SCDs in place    Drain  Na  Micro  Na  PT/OT  Pending  Labs (pertinent)  Hgb 7.2  Imaging  No new imaging today        A&P: Kathy Pappas is a 83 y.o. female s/p open reduction internal fixation of right proximal femur fracture on 2/18/2024.  Appreciate care from internal medicine  and ICU primary team  Appreciate neurology consult  Unclear if truly had seizure prior to arrest  CT head no acute abnormalities  EEG prelim negative  Possible may have simply been a reaction to olanzapine  Abx prophylaxis: 24hr periop completed  Analgesia: Multimodal with breakthrough   Consults: Appreciate therapy, SW, case mgmt  Dressing: Aquacel/Mepilex for 7d. Patient will remove at home then apply daily dry dressing PRN  DVT ppx: Early mobilization, SCDs, pharmacologic (prefer LVX 6wks)  BLE swelling: Compression hose  Remainder of care pending stabilization in ICU  Activity: WBAT  Diet: Routine. DC IVF when adequate PO intake  DC: Pending ICU stabilization. F/u with me 2wks after DC.

## 2024-02-20 NOTE — NURSING NOTE
Report called to Trav ALEXANDER.  Transport at bedside    pt to step down unit accompanied by daughter

## 2024-02-20 NOTE — CONSULTS
Wound Care Consult     Visit Date: 2/20/2024      Patient Name: Kathy Pappas         MRN: [id number]           YOB: 1940       Pertinent Labs:   Albumin   Date Value Ref Range Status   02/20/2024 3.0 (L) 3.4 - 5.0 g/dL Final       Wound Assessment:  Wound 02/18/24 Skin Tear Elbow Dorsal;Right (Active)   Wound Image   02/20/24 1616   Site Assessment Pink;Red 02/20/24 1616   Elizabeth-Wound Assessment Ecchymotic;Fragile 02/20/24 1616   Shape irregular 02/20/24 1616   Wound Length (cm) 6 cm 02/20/24 1616   Wound Width (cm) 2.5 cm 02/20/24 1616   Wound Surface Area (cm^2) 15 cm^2 02/20/24 1616   Wound Depth (cm) 0.2 cm 02/20/24 1616   Wound Volume (cm^3) 3 cm^3 02/20/24 1616   Margins Well-defined edges 02/20/24 1616   Drainage Description None 02/20/24 1616   Drainage Amount None 02/20/24 1616   Dressing Pressure dressing;Dry dressing 02/20/24 1616   Dressing Changed Changed 02/20/24 1616   Dressing Status Clean;Dry 02/20/24 1616       Wound 02/18/24 Skin Tear Elbow Dorsal;Left (Active)   Wound Image   02/20/24 1616   Site Assessment Red;Purple 02/20/24 1616   Elizabeth-Wound Assessment Ecchymotic;Fragile 02/20/24 1616   Wound Length (cm) 0.8 cm 02/20/24 1616   Wound Width (cm) 2 cm 02/20/24 1616   Wound Surface Area (cm^2) 1.6 cm^2 02/20/24 1616   Wound Depth (cm) 0.2 cm 02/20/24 1616   Wound Volume (cm^3) 0.32 cm^3 02/20/24 1616   Margins Well-defined edges 02/20/24 1616   Drainage Description Sanguineous 02/20/24 1616   Drainage Amount Small 02/20/24 1616   Dressing Petroleum gauze;Dry dressing 02/20/24 1616   Dressing Changed Changed 02/20/24 1616   Dressing Status Clean;Dry 02/20/24 1616       Wound 02/18/24 Incision Hip Right;Lateral (Active)   Site Assessment Unable to assess 02/19/24 2330   Elizabeth-Wound Assessment Dry;Ecchymotic 02/19/24 2015   Closure Unable to assess 02/19/24 1530   Sutures/Staple Line Approximated 02/18/24 2110   Dressing Absorptive 02/20/24 1100   Dressing Status Clean;Dry;Old  "drainage 02/20/24 1100       Wound 02/19/24 Skin Tear Hand Right;Anterior (Active)   Wound Image   02/20/24 1614   Site Assessment Pale;Pink 02/20/24 1614   Elizabeth-Wound Assessment Fragile 02/20/24 1614   Shape irregular 02/20/24 1614   Wound Length (cm) 2.5 cm 02/20/24 1614   Wound Width (cm) 2.5 cm 02/20/24 1614   Wound Surface Area (cm^2) 6.25 cm^2 02/20/24 1614   Wound Depth (cm) 0.2 cm 02/20/24 1614   Wound Volume (cm^3) 1.25 cm^3 02/20/24 1614   Margins Well-defined edges 02/20/24 1614   Drainage Description Serosanguineous 02/20/24 1614   Drainage Amount Scant 02/20/24 1614   Dressing Petroleum gauze;Dry dressing 02/20/24 1614   Dressing Changed Changed 02/20/24 1614   Dressing Status Clean;Dry 02/20/24 1614       Wound 02/20/24 Skin Tear Finger (Comment which one) Dorsal;Left (Active)   Wound Image   02/20/24 1615   Site Assessment Pink;Red 02/20/24 1615   Elizabeth-Wound Assessment Moist  02/20/24 1615   Wound Length (cm) 1 cm 02/20/24 1615   Wound Width (cm) 2.2 cm 02/20/24 1615   Wound Surface Area (cm^2) 2.2 cm^2 02/20/24 1615   Wound Depth (cm) 0.2 cm 02/20/24 1615   Wound Volume (cm^3) 0.44 cm^3 02/20/24 1615   Margins Well-defined edges 02/20/24 1615   Drainage Description Serosanguineous 02/20/24 1615   Drainage Amount Scant 02/20/24 1615   Dressing Petroleum gauze;Dry dressing 02/20/24 1615   Dressing Changed Changed 02/20/24 1615   Dressing Status Clean;Dry 02/20/24 1615     Patient seen for multiple skin tears (present on admission) complicated by PMH: diabetes, hypertension, hyperlipidemia and recent fall resulting in right hip fracture. Exam conducted with bedside CATHERINE Ravi. Patients family was at bedside and reported that patient had multiple skin tears in result from fall Saturday however right hand skin tear occurred while inpatient from \"pulling at her tubes\" . Orthopedics on consult for right hip incision, assisted bedside RN with dressing change of distal aquacel dressing per order.  Skin hygiene " and dressing care provided. See detailed assessment above from flowsheet. Recommendations below.    Wound location: Multiple skin tear; right hand, left 5th finger, bilateral elbows   Treatment protocol recommended:  Cleanse with normal saline and pat dry.  Apply adaptic, gauze and clean dry dressing every 3 days/prn.   Continue to off load, turning at least every 2 hours. Offload heels.    Therapeutic surface: Patient on [place] [employer] pressure relieving mattress during exam. Staff to continue to turn/reposition patient atleast every 2 hours.     Nursing updated, continue pressure injury preventions, nursing to follow provider orders and re-consult wound RN if needed.    See above recommendations for treatment.    Please contact me with questions or changes in patient condition.  Whit Pacheco RN  [facility]   [phone]

## 2024-02-21 NOTE — PROGRESS NOTES
S: Doing well this morning.  Comfortable.  Out of the ICU.  Still seems a little bit confused, but is AOx3.          O  VS:  Temp:  [36.6 °C (97.8 °F)-37.3 °C (99.2 °F)] 37.3 °C (99.2 °F)  Heart Rate:  [] 104  Resp:  [10-21] 18  BP: (121-167)/() 167/80    Gen: NAD  Focused exam of operative extremity:  -Proximal dressing minor stains, distal dressing was changed 2/20/2024 and only has 1 minor spot  -SILT in S/S/SP/DP/T/F distributions  -Able to flex Q, TA, GS  -Palpable DP pulse, symmetric to contralateral  -SCDs in place    Drain  Na  Micro  Na  PT/OT  Began working with PT and OT yesterday.  Labs (pertinent)  Hgb 7.4  Imaging  No new imaging today          A&P: Kathy Pappas is a 83 y.o. female s/p open reduction internal fixation of right proximal femur fracture on 2/18/2024.  Appreciate care from internal medicine  and ICU primary team  Appreciate neurology consult  Unclear if truly had seizure prior to arrest  CT head (both initial and repeat) no acute abnormalities  EEG negative on final report  MRI brain without contrast completed yesterday, report pending  Possible may have simply been a reaction to olanzapine  No strong indication for AED currently, continue supportive care  Abx prophylaxis: 24hr periop completed  Analgesia: Multimodal with breakthrough   Consults: Appreciate therapy, ASHLEY, case mgmt  Dressing: Aquacel/Mepilex for 7d. Patient will remove at home then apply daily dry dressing PRN  DVT ppx: Early mobilization, SCDs, pharmacologic (prefer LVX 6wks)  BLE swelling: Compression hose  Activity: WBAT  Diet: Routine. DC IVF when adequate PO intake  DC: Pending medical stabilization. F/u with me 2wks after DC.

## 2024-02-21 NOTE — PROGRESS NOTES
"Kathy Pappas is a 83 y.o. female on day 3 of admission presenting with Fall, initial encounter.      Subjective   Pt seen and examined again this morning. No family present. Pt resting in bed. Alert and answering questions. States a little \"sore\" today but otherwise no complaints. No change to examination from yesterday but more alert today.     EEG final report normal, no seizures.   MRI brain wo contrast report was not available at time of examination. But images and report reviewed personally after visit.     Pt seen again this afternoon with Dr. Miller present. Sitting on side of bed with therapy provider and care staff. Pt states she already discussed results with IMS provider. I reiterated with patient about results again. Family not present today.        Objective     Last Recorded Vitals  Blood pressure 134/71, pulse 97, temperature 36.9 °C (98.5 °F), temperature source Temporal, resp. rate 18, height 1.6 m (5' 2.99\"), weight 62.7 kg (138 lb 3.7 oz), SpO2 91 %.    Physical Exam  Constitutional:       General: She is awake.   Neurological:      Mental Status: She is alert.   Psychiatric:         Speech: Speech normal.       Neurological Exam  Mental Status  Awake and alert. Oriented only to person, place and time. Speech is normal.    Cranial Nerves  CN II-XII grossly intact.    Motor  Normal muscle bulk throughout. No fasciculations present. Normal muscle tone. No abnormal involuntary movements.  Strength at least antigravity throughout.    Gait    Not tested.      Relevant Results    Scheduled medications  aspirin, 81 mg, oral, Daily  docusate sodium, 100 mg, oral, BID  insulin lispro, 0-10 Units, subcutaneous, TID with meals  levETIRAcetam, 500 mg, oral, BID  metoprolol succinate XL, 25 mg, oral, Daily  polyethylene glycol, 17 g, oral, Daily  [Held by provider] simvastatin, 40 mg, oral, Nightly  [Held by provider] valsartan 320 mg, hydroCHLOROthiazide 25 mg for Diovan HCT, , oral, Daily      Continuous " medications     PRN medications  PRN medications: acetaminophen **OR** acetaminophen **OR** acetaminophen, dextromethorphan-guaifenesin, dextrose 10 % in water (D10W), dextrose, glucagon, guaiFENesin, HYDROmorphone, ondansetron **OR** ondansetron, oxyCODONE, oxygen    Results for orders placed or performed during the hospital encounter of 02/17/24 (from the past 24 hour(s))   POCT GLUCOSE   Result Value Ref Range    POCT Glucose 132 (H) 74 - 99 mg/dL   POCT GLUCOSE   Result Value Ref Range    POCT Glucose 121 (H) 74 - 99 mg/dL   CBC   Result Value Ref Range    WBC 8.5 4.4 - 11.3 x10*3/uL    nRBC 0.0 0.0 - 0.0 /100 WBCs    RBC 2.47 (L) 4.00 - 5.20 x10*6/uL    Hemoglobin 7.4 (L) 12.0 - 16.0 g/dL    Hematocrit 23.5 (L) 36.0 - 46.0 %    MCV 95 80 - 100 fL    MCH 30.0 26.0 - 34.0 pg    MCHC 31.5 (L) 32.0 - 36.0 g/dL    RDW 14.4 11.5 - 14.5 %    Platelets 178 150 - 450 x10*3/uL   Basic Metabolic Panel   Result Value Ref Range    Glucose 93 74 - 99 mg/dL    Sodium 139 136 - 145 mmol/L    Potassium 3.3 (L) 3.5 - 5.3 mmol/L    Chloride 107 98 - 107 mmol/L    Bicarbonate 23 21 - 32 mmol/L    Anion Gap 12 10 - 20 mmol/L    Urea Nitrogen 33 (H) 6 - 23 mg/dL    Creatinine 2.21 (H) 0.50 - 1.05 mg/dL    eGFR 22 (L) >60 mL/min/1.73m*2    Calcium 7.0 (L) 8.6 - 10.3 mg/dL   Magnesium   Result Value Ref Range    Magnesium 2.01 1.60 - 2.40 mg/dL   Hepatic function panel   Result Value Ref Range    Albumin 2.8 (L) 3.4 - 5.0 g/dL    Bilirubin, Total 0.6 0.0 - 1.2 mg/dL    Bilirubin, Direct 0.2 0.0 - 0.3 mg/dL    Alkaline Phosphatase 55 33 - 136 U/L    ALT 3 (L) 7 - 45 U/L    AST 49 (H) 9 - 39 U/L    Total Protein 5.4 (L) 6.4 - 8.2 g/dL   Urinalysis with Reflex Microscopic   Result Value Ref Range    Color, Urine Yellow Straw, Yellow    Appearance, Urine Clear Clear    Specific Gravity, Urine 1.009 1.005 - 1.035    pH, Urine 6.0 5.0, 5.5, 6.0, 6.5, 7.0, 7.5, 8.0    Protein, Urine 30 (1+) (N) NEGATIVE mg/dL    Glucose, Urine NEGATIVE  NEGATIVE mg/dL    Blood, Urine MODERATE (2+) (A) NEGATIVE    Ketones, Urine NEGATIVE NEGATIVE mg/dL    Bilirubin, Urine NEGATIVE NEGATIVE    Urobilinogen, Urine <2.0 <2.0 mg/dL    Nitrite, Urine NEGATIVE NEGATIVE    Leukocyte Esterase, Urine NEGATIVE NEGATIVE   Sodium, Urine Random   Result Value Ref Range    Sodium, Urine Random 81 mmol/L    Creatinine, Urine Random 48.8 20.0 - 320.0 mg/dL    Sodium/Creatinine Ratio 166 Not established. mmol/g Creat   Albumin, Urine Random   Result Value Ref Range    Albumin, Urine Random 42.0 Not established mg/L    Creatinine, Urine Random 49.2 20.0 - 320.0 mg/dL    Albumin/Creatine Ratio 85.4 (H) <30.0 ug/mg Creat   Microscopic Only, Urine   Result Value Ref Range    WBC, Urine 1-5 1-5, NONE /HPF    RBC, Urine NONE NONE, 1-2, 3-5 /HPF    Squamous Epithelial Cells, Urine 1-9 (SPARSE) Reference range not established. /HPF    Mucus, Urine 1+ Reference range not established. /LPF    Hyaline Casts, Urine OCCASIONAL (A) NONE /LPF   POCT GLUCOSE   Result Value Ref Range    POCT Glucose 129 (H) 74 - 99 mg/dL   POCT GLUCOSE   Result Value Ref Range    POCT Glucose 107 (H) 74 - 99 mg/dL   Transthoracic Echo (TTE) Complete   Result Value Ref Range    LVOT diam 2.00 cm    LV biplane EF 81 %    MV E/A ratio 0.82     MV avg E/e' ratio 13.95     Tricuspid annular plane systolic excursion 2.2 cm    AV mn grad 6.0 mmHg    LA vol index A/L 15.1 ml/m2    AV pk nicol 1.54 m/s    RV free wall pk S' 19.90 cm/s    RVSP 59.6 mmHg    LVIDd 3.54 cm    Aortic Valve Area by Continuity of VTI 1.99 cm2    Aortic Valve Area by Continuity of Peak Velocity 1.78 cm2    AV pk grad 9.5 mmHg    LV A4C EF 81.2    POCT GLUCOSE   Result Value Ref Range    POCT Glucose 153 (H) 74 - 99 mg/dL     MR brain wo IV contrast    Result Date: 2/21/2024  Interpreted By:  Alaina Parra, STUDY: MR BRAIN WO IV CONTRAST   INDICATION: Signs/Symptoms:reported new onset seizure post op followed by full arrest   COMPARISON: Head CT  02/19/2024   ACCESSION NUMBER(S): [id number]   ORDERING CLINICIAN: TINO STALLINGS   TECHNIQUE: Multi-planar multi-sequential MR imaging of the brain was performed without intravenous contrast.   FINDINGS: Recent hemorrhage within the folia of the right cerebellar hemisphere with mild associated edema. This may be secondary to thrombosed developmental venous anomaly, seen within the medial aspect of the right cerebellar hemisphere on GRE is (series 601, image 11). 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 No mass effect on the 4th ventricle. No cerebellar tonsillar herniation. Restriction in the right cerebellar hemisphere is thought to be secondary to blood products.   Punctate infarct within the right anterior centrum semiovale (series 204, image 224). No associated edema or hemorrhage.   Scattered areas of sulcal susceptibility seen within bilateral frontal sulci and right greater than left parietal lobe. No associated FLAIR signal abnormality in these regions or parenchymal edema. When corresponding to CT, no hyperdensity.   No hydrocephalus.     The visualized intraorbital contents are normal.   Mild mucosal thickening of the left maxillary sinus. Trace bilateral mastoid effusions.   The visualized osseous structures, soft tissues and partially visualized parotid glands appear normal.       Recent hemorrhage extending along the folia of the right cerebellar hemisphere adjacent to - and possibly secondary to - a developmental venous anomaly within the medial aspect of the right cerebellar hemisphere. Hemorrhagic infarct is thought to be less likely. No associated mass effect. 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   Punctate recent infarct in the right anterior centrum semiovale without associated edema or hemorrhage.   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   Critical Finding:  See findings. Notification was initiated on 2/21/2024 at 9:54 am by  Alaina Parra.  (**-OCF-**)   Signed by: Alaina Parra 2/21/2024 9:57 AM Dictation workstation:   [id number]    EEG    IMPRESSION This is a normal awake and drowsy EEG. No epileptiform discharges, seizures or lateralizing signs seen. This report has been interpreted and electronically signed by    CT head wo IV contrast    Result Date: 2/19/2024  Interpreted By:  Julien Odonnell,  and Mela Child STUDY: CT HEAD WO IV CONTRAST;  2/19/2024 3:29 pm   INDICATION: Signs/Symptoms:Post cardiac arrest.   COMPARISON: 02/17/2024   ACCESSION NUMBER(S): [id number]   ORDERING CLINICIAN: NUHA FERRERA   TECHNIQUE: Axial CT images of the head were obtained without intravenous contrast administration.   FINDINGS: There is moderate brain parenchymal volume loss.   There are patchy nonspecific white matter changes within the cerebral hemispheres bilaterally which while nonspecific, given the patient's age, likely represent sequelae of small-vessel ischemic change.   Additional small scattered hypodensities in are again noted within the subinsular regions, basal ganglia, and thalami bilaterally suggesting incidental mildly prominent perivascular spaces and/or small scattered lacunar infarctions.   No hyperdense acute intracranial hemorrhage is noted.   Dystrophic calcifications are again noted within the bilateral basal ganglia and cerebellar hemispheres stable when compared with the prior study.   There is no midline shift. The suprasellar/basilar cisterns are patent.   Atherosclerotic calcifications are noted along the carotid siphons.   There is lobulated mucosal thickening noted within the inferior maxillary sinuses left greater than right. Minimal  mucosal thickening is noted within a few scattered ethmoid air cells.       There is moderate brain parenchymal volume loss.   There are patchy nonspecific white matter changes within the cerebral hemispheres bilaterally which while nonspecific, given the patient's age, likely represent sequelae of small-vessel ischemic change. Additional small scattered hypodensities in are again noted within the subinsular regions, basal ganglia, and thalami bilaterally suggesting incidental mildly prominent perivascular spaces and/or small scattered lacunar infarctions.   I personally reviewed the images/study and I agree with the findings as stated by Mateusz Gloria MD (Radiology Resident). This study was interpreted at [facility], [place], [place].     MACRO: None.   Signed by: Julien Odonnell 2/19/2024 5:12 PM Dictation workstation:   [id number]    ECG 12 lead    Result Date: 2/19/2024  Sinus rhythm Borderline T abnormalities, anterior leads Confirmed by Michael Bloom ([id number]) on 2/19/2024 1:49:43 PM    FL less than 1 hour    Result Date: 2/19/2024  These images are not reportable by radiology and will not be interpreted by  Radiologists.    ECG 12 lead    Result Date: 2/19/2024  Sinus rhythm Low voltage, precordial leads    XR hip right with pelvis when performed 2 or 3 views    Result Date: 2/19/2024  Interpreted By:  Armin Clayton, STUDY: XR HIP RIGHT WITH PELVIS WHEN PERFORMED 2 OR 3 VIEWS; ;  2/18/2024 9:03 pm   INDICATION: Signs/Symptoms:Postop.   COMPARISON: 02/17/2024 radiographs at 9:14 p.m.   ACCESSION NUMBER(S): [id number]   ORDERING CLINICIAN: BRENT MARTÍNEZ   FINDINGS: The right femoral intertrochanteric fracture has been stabilized using short intramedullary stiven and screw. The fracture fragments are in anatomic alignment. The tip of the screw lies within the femoral head.   The overall bony mineralization is decreased. No other fracture or dislocation of the pelvis  or left hip is noted. Medial calcific sclerosis of the arteries is present as seen with diabetes or end stage renal disease.       Right intertrochanteric fracture fixation.     MACRO: None   Signed by: Armin Clayton 2/19/2024 7:37 AM Dictation workstation:   [id number]    XR chest 1 view    Result Date: 2/19/2024  Interpreted By:  Baltazar Chapa, STUDY: XR CHEST 1 VIEW;  2/19/2024 2:19 am   INDICATION: Signs/Symptoms:Intubation.   COMPARISON: None.   ACCESSION NUMBER(S): [id number]   ORDERING CLINICIAN: NUHA FERRERA   FINDINGS:   CARDIOMEDIASTINAL SILHOUETTE: Cardiomediastinal silhouette is normal in size and configuration.   LUNGS/PLEURA: There are no consolidations.There are no pleural effusions. There is no demonstrated pneumothorax.   LINES/TUBES: Interval placement of endotracheal tube terminating 2.5 cm above the aleksandr. Enteric tube courses below the diaphragm and coiled in gastric fundus with its tip pointing toward the gastroesophageal junction.   BONES: No evidence of acute osseous abnormality. There is a chronic left proximal humerus fracture as also visualized on prior radiographs.       1.  No evidence of acute cardiopulmonary process. Lines and tubes as described above.     Signed by: Baltazar Chapa 2/19/2024 2:36 AM Dictation workstation:   [id number]    XR femur right 2+ views    Result Date: 2/18/2024  Interpreted By:  Kris Deleon, STUDY: XR FEMUR RIGHT 2+ VIEWS;  2/18/2024 12:32 pm   INDICATION: Signs/Symptoms:AP and lateral. Ruling out adjacent injury prior to surgery. Low pre-test probability. Thank you..   COMPARISON: None.   ACCESSION NUMBER(S): [id number]   ORDERING CLINICIAN: BRENT MARTÍNEZ   FINDINGS: Bony structures:  Comminuted impacted basicervical and intertrochanteric fracture with varus angulation. The remaining bony structures are intact.   Joint spaces:  The hip joint space is maintained. There is mild tricompartment arthrosis at the knee, particularly at the medial joint  compartment with mild osteophytosis. No definite knee joint effusion.   Soft tissues:  Fairly extensive atherosclerotic calcification of the superficial femoral artery and popliteal artery.   Other:  None significant       Hip fracture.   MACRO: None   Signed by: Kris Deleon 2/18/2024 12:39 PM Dictation workstation:   [id number]    CT head wo IV contrast    Result Date: 2/17/2024  Interpreted By:  Baltazar Chapa, STUDY: CT HEAD WO IV CONTRAST; CT CERVICAL SPINE WO IV CONTRAST;  2/17/2024 9:32 pm   INDICATION: Signs/Symptoms:fall. Altered mental status   COMPARISON: No prior cross-sectional imaging is available for comparison.   ACCESSION NUMBER(S): [id number]; [id number]   ORDERING CLINICIAN: OLIVER RICHARDS   TECHNIQUE: Axial noncontrast CT images of the head and cervical spine.   FINDINGS: BRAIN PARENCHYMA: Gray-white matter interfaces are preserved. No mass, mass effect or midline shift. There are mild periventricular white matter hypodensities suggestive of mild chronic microvascular ischemic change.   HEMORRHAGE: No acute intracranial hemorrhage. VENTRICLES and EXTRA-AXIAL SPACES: Normal size for age. EXTRACRANIAL SOFT TISSUES: Unremarkable. CALVARIUM: No depressed calvarial fracture.   SOFT TISSUES: Within normal limits. PARANASAL SINUSES: No hemorrhage in the paranasal sinuses. There is moderate polypoid mucosal thickening of the left maxillary sinus. MASTOIDS: Within normal limits. ORBITS: Grossly normal.   ALIGNMENT: Normal cervical lordosis. VERTEBRAE: No acute fracture. Cervical vertebral body heights are maintained. There are mild age-indeterminate compression deformities of the superior endplates of T1 and T2 vertebral bodies with associated superior endplate Schmorl nodes. There are no suspicious osseous lesions. Mild osteopenia. Mild degenerative disc space narrowing and endplate reactive change at C4-C5. SPINAL CANAL: No significant spinal canal stenosis. PREVERTEBRAL SOFT TISSUES: No  prevertebral soft tissue swelling. LUNG APICES: Imaged portion of the lung apices are within normal limits.   OTHER FINDINGS: None.         No acute intracranial abnormality.   No evidence of cervical spine fracture or traumatic malalignment. Mild age-indeterminate compression deformities of the superior endplates of T1 and T2. Correlation with point tenderness recommended.   Signed by: Baltazar Chapa 2/17/2024 9:39 PM Dictation workstation:   [id number]    CT cervical spine wo IV contrast    Result Date: 2/17/2024  Interpreted By:  Baltazar Chapa, STUDY: CT HEAD WO IV CONTRAST; CT CERVICAL SPINE WO IV CONTRAST;  2/17/2024 9:32 pm   INDICATION: Signs/Symptoms:fall. Altered mental status   COMPARISON: No prior cross-sectional imaging is available for comparison.   ACCESSION NUMBER(S): [id number]; [id number]   ORDERING CLINICIAN: OLIVER RICHARDS   TECHNIQUE: Axial noncontrast CT images of the head and cervical spine.   FINDINGS: BRAIN PARENCHYMA: Gray-white matter interfaces are preserved. No mass, mass effect or midline shift. There are mild periventricular white matter hypodensities suggestive of mild chronic microvascular ischemic change.   HEMORRHAGE: No acute intracranial hemorrhage. VENTRICLES and EXTRA-AXIAL SPACES: Normal size for age. EXTRACRANIAL SOFT TISSUES: Unremarkable. CALVARIUM: No depressed calvarial fracture.   SOFT TISSUES: Within normal limits. PARANASAL SINUSES: No hemorrhage in the paranasal sinuses. There is moderate polypoid mucosal thickening of the left maxillary sinus. MASTOIDS: Within normal limits. ORBITS: Grossly normal.   ALIGNMENT: Normal cervical lordosis. VERTEBRAE: No acute fracture. Cervical vertebral body heights are maintained. There are mild age-indeterminate compression deformities of the superior endplates of T1 and T2 vertebral bodies with associated superior endplate Schmorl nodes. There are no suspicious osseous lesions. Mild osteopenia. Mild degenerative disc space  narrowing and endplate reactive change at C4-C5. SPINAL CANAL: No significant spinal canal stenosis. PREVERTEBRAL SOFT TISSUES: No prevertebral soft tissue swelling. LUNG APICES: Imaged portion of the lung apices are within normal limits.   OTHER FINDINGS: None.         No acute intracranial abnormality.   No evidence of cervical spine fracture or traumatic malalignment. Mild age-indeterminate compression deformities of the superior endplates of T1 and T2. Correlation with point tenderness recommended.   Signed by: Baltazar Chapa 2/17/2024 9:39 PM Dictation workstation:   [id number]    XR knee right 1-2 views    Result Date: 2/17/2024  Interpreted By:  Orion Lucas, STUDY: XR KNEE RIGHT 1-2 VIEWS;  2/17/2024 9:24 pm   INDICATION: Signs/Symptoms:fall.   COMPARISON: None   ACCESSION NUMBER(S): [id number]   ORDERING CLINICIAN: OLIVER RICHARDS   FINDINGS: Two views right knee   No acute fracture or dislocation. Moderate-to-severe tricompartmental degenerative changes most pronounced in the medial and patellofemoral compartments with joint space narrowing and marginal osteophytosis. Small joint effusion. Severe vascular calcifications.       1. No acute osseous abnormality identified.       Signed by: Orion Lucas 2/17/2024 9:31 PM Dictation workstation:   [id number]    XR hip right with pelvis when performed 2 or 3 views    Result Date: 2/17/2024  Interpreted By:  Orion Lucas, STUDY: XR HIP RIGHT WITH PELVIS WHEN PERFORMED 2 OR 3 VIEWS;  2/17/2024 9:24 pm   INDICATION: Signs/Symptoms:pain.   COMPARISON: None.   ACCESSION NUMBER(S): [id number]   ORDERING CLINICIAN: OLIVER RICHARDS   FINDINGS: AP view of the pelvis with AP and lateral views of the right hip   Basicervical/intertrochanteric fracture of the right hip with an additional fracture Prolene extending through the base of the lesser trochanter. Mild angulation of the intertrochanteric fracture site and mild displacement of the lesser trochanter.  No dislocation. Heavy vascular calcifications in the pelvis and upper thighs.         1. Comminuted right basicervical/intertrochanteric femoral fracture.       Signed by: Orion Lucas 2/17/2024 9:30 PM Dictation workstation:   [id number]                       Opal Coma Scale  Best Eye Response: Spontaneous  Best Verbal Response: Confused  Best Motor Response: Follows commands  [place] Coma Scale Score: 14              Assessment/Plan   This patient currently has cardiac telemetry ordered; if you would like to modify or discontinue the telemetry order, click here to go to the orders activity to modify/discontinue the order.  Principal Problem:    Fall, initial encounter  Active Problems:    Fracture of right hip, closed, initial encounter (CMS/[facility])    IMPRESSION:  83 year old female with history of DM, HTN, and DLD presented after having mechanical fall on 2/17 that resulted in R femoral fracture. She had ORIF done 2/18 with reported confusion/hallucinations in the post op period requiring dose of olanzepine IM. At 0115 on 2/19/24, ? Seizure activity followed by full arrest (seizure per nursing documentation, not in physician code blue documentation).   HCT without acute findings, repeat HCT unchanged.   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   On personal review of imaging and comparison to prior HCTs this admission, there seemingly is possible calcified area to R cerebellar region on both studies correlating to this area of possible recent hemorrhage on the MRI. Cannot definitively state  when this hemorrhage occurred.   Review of the MRI T2* images also shows areas of blood product within the sulci of the B frontal regions and R occipital area. Likely consistent with prior subarachnoid hemorrhages (not on DWI so at least not acute but age unknown).    [place]S Dr. Hubbard [place] [facility] [place] states no surgical intervention needed but can start 81 mg ASA for secondary stroke prevention with acute punctate stroke found.      RECOMMENDATIONS:  With hemosiderin and hemorrhage findings, will start on AED. Keppra 500 mg BID ordered. ? How long definitively she may need this as ? Age of these hemorrhagic findings.   Continue seizure/delirium precautions  Continue supportive care.   Agree with starting 81 mg ASA.   HI statin when able, holding due to elevated LFTs  Continue PT/OT as able  Will repeat HCT tomorrow to monitor for any change in bleeding.      Case discussed and managed with Dr. Miller who also reviewed MRI imaging.   Will continue to follow.       Mariza Rojo, APRN-CNP

## 2024-02-21 NOTE — CARE PLAN
Problem: Mobility  Goal: STRENGTHENING  Description: 20+ REPS AROM RLE AND RROM ON LLE INCREASING STRENGTH TO STABILIZE GAIT  Outcome: Progressing     Problem: Transfers  Goal: STG - Patient to transfer to and from sit to supine  Description: MIN A USING RAILS  Outcome: Progressing     Problem: Mobility  Goal: STG - Patient will ambulate  Description: FWW WBAT RLE, MIN X1 50-75 FT  Outcome: Not Progressing     Problem: Transfers  Goal: STG - Patient will transfer sit to and from stand  Description: RLE WBAT FWW, MIN A USING PROPER TECHNIQUE  Outcome: Not Progressing

## 2024-02-21 NOTE — PROGRESS NOTES
Occupational Therapy    OT Treatment    Patient Name: Kathy Pappas  MRN: [id number]  Today's Date: 2/21/2024  Time Calculation  Start Time: 1526  Stop Time: 1604  Time Calculation (min): 38 min         Assessment:  End of Session Communication: Bedside nurse  End of Session Patient Position: Bed, 3 rail up, Alarm on     Plan:  Treatment Interventions: ADL retraining, Functional transfer training, UE strengthening/ROM, Endurance training, Cognitive reorientation, Patient/family training, Equipment evaluation/education, Compensatory technique education  OT Frequency: 4 times per week  Equipment Recommended upon Discharge:  (TBD)  OT Recommended Transfer Status: Moderate assist, Assist of 2  OT - OK to Discharge: Yes (okay to dc to next level of care once medically cleared)  Treatment Interventions: ADL retraining, Functional transfer training, UE strengthening/ROM, Endurance training, Cognitive reorientation, Patient/family training, Equipment evaluation/education, Compensatory technique education    Subjective   Previous Visit Info:  OT Last Visit  OT Received On: 02/21/24  General:  General  Prior to Session Communication: Bedside nurse  Patient Position Received: Bed, 3 rail up, Alarm on  General Comment: Patient agreeable to OT session.  Precautions:  LE Weight Bearing Status: Weight Bearing as Tolerated  Medical Precautions: Fall precautions (elliott catheter)     Pain:  Pain Assessment  Pain Assessment: 0-10 (Patient denied pain when asked at rest. Grimaced during standing. Recently received meds.)    Objective    Cognition:  Cognition  Overall Cognitive Status: Impaired     Activities of Daily Living: Grooming  Grooming Level of Assistance: Setup, Close supervision (with oral care while at EOB)  Grooming Comments: Patient had extensive knots in hair, Max assist with hair care while at EOB.     Bed Mobility/Transfers: Bed Mobility  Bed Mobility: Yes  Bed Mobility 1  Bed Mobility 1: Supine to sitting, Sitting  to supine  Level of Assistance 1: Moderate assistance (assist of 2)    Transfers  Transfer: Yes  Transfer 1  Technique 1: Sit to stand, Stand to sit  Transfer Device 1: Walker  Transfer Level of Assistance 1: Moderate assistance, +2  Trials/Comments 1:  (Patient became light headed during standing. Not appropriate for transfer to chair at this time.)  Transfers 2  Transfer Device 2: Walker (Patient completed side steps at EOB)  Transfer Level of Assistance 2: Moderate assistance, +2    Sitting Balance:  Static Sitting Balance  Static Sitting-Balance Support: (P) No upper extremity supported  Static Sitting-Level of Assistance: (P) Close supervision  Static Sitting-Comment/Number of Minutes: (P) sat EOB approx 15 minutes with Close Supervision.    Outcome Measures:[facility] Daily Activity  Putting on and taking off regular lower body clothing: A lot  Bathing (including washing, rinsing, drying): A lot  Putting on and taking off regular upper body clothing: A lot  Toileting, which includes using toilet, bedpan or urinal: A lot  Taking care of personal grooming such as brushing teeth: A little  Eating Meals: None  Daily Activity - Total Score: 15        Education Documentation  Precautions, taught by NEIDA Naranjo at 2/21/2024  4:16 PM.  Learner: Patient  Readiness: Acceptance  Method: Explanation  Response: Verbalizes Understanding, Needs Reinforcement    ADL Training, taught by NEIDA Naranjo at 2/21/2024  4:16 PM.  Learner: Patient  Readiness: Acceptance  Method: Explanation  Response: Verbalizes Understanding, Needs Reinforcement    Education Comments  No comments found.      Goals:  Encounter Problems       Encounter Problems (Active)       ADLs       Patient will perform UB and LB bathing  with minimal assist  level of assistance. (Progressing)       Start:  02/20/24    Expected End:  03/05/24            Patient with complete upper body dressing with contact guard assist level of assistance  donning and doffing all UE clothes with no adaptive equipment while edge of bed  (Progressing)       Start:  02/20/24    Expected End:  03/05/24                     TRANSFERS       Patient will complete sit to stand transfer with minimal assist  level of assistance and front wheeled walker in order to improve safety and prepare for out of bed mobility. (Progressing)       Start:  02/20/24    Expected End:  03/05/24            OT GOAL 3       Start:  02/20/24    Expected End:  03/05/24

## 2024-02-21 NOTE — PROGRESS NOTES
Physical Therapy    Physical Therapy Treatment    Patient Name: Kathy Pappas  MRN: [id number]  Today's Date: 2/21/2024  Time Calculation  Start Time: 1527  Stop Time: 1605  Time Calculation (min): 38 min       Assessment/Plan   PT Assessment  PT Assessment Results: Decreased strength, Decreased endurance, Impaired balance, Decreased mobility, Decreased coordination, Impaired judgement, Decreased safety awareness, Impaired hearing, Pain  Rehab Prognosis: Fair  Evaluation/Treatment Tolerance: Patient limited by fatigue, Patient limited by pain  End of Session Communication: Bedside nurse, PCT/NA/CTA  Assessment Comment:  (INCREASED EX REPS TO 10 TODAY, MORE ALERT, LIGHTHEADED W/ 2 REPS STANDING UP AT BEDSIDE FWW WBAT RLE, MOD X2 A, FATIGUED QUICKLY, DISCH REC REMAINS THE SAME)  End of Session Patient Position: Bed, 3 rail up, Alarm on (CALL LIGHT IN REACH)  PT Plan  Inpatient/Swing Bed or Outpatient: Inpatient  PT Plan  Treatment/Interventions: Bed mobility, Transfer training, Gait training, Strengthening, Endurance training  PT Plan: Skilled PT  PT Frequency: 5 times per week (1-2X/DAY)  PT Discharge Recommendations: Moderate intensity level of continued care  Equipment Recommended upon Discharge:  (TBD)  PT Recommended Transfer Status: Assist x2 (FWW)  PT - OK to Discharge: Yes (WHEN MEDICALLY CLEARED)      General Visit Information:   PT  Visit  PT Received On: 02/21/24  General  Co-Treatment:  (ROBBINS)  Co-Treatment Reason: FACILITATE MOBILITY SAFETY  Prior to Session Communication: Bedside nurse  Patient Position Received: Bed, 3 rail up, Alarm on  General Comment: OK PER RN TO SEE FOR REHAB, ROOM 2031 ALERT EMANUEL LANGE,PT AGREEABLE FOR THERAPY    Subjective   Precautions:  Precautions  Hearing/Visual Limitations: Mcgrath  LE Weight Bearing Status:  (RLE WBAT)  Medical Precautions: Fall precautions, Neuro precautions (PER DR DUNN-MRI FOUND NEW CVA)       Objective   Pain:  Pain Assessment  Pain Score:  (0/10 IN BED,  W/ MOBILTY PT. GRIMACES AND STATES R HIP PAIN DID NOT RATE)  Cognition:  Cognition  Overall Cognitive Status: Impaired  Problem Solving: Assistance required to identify errors made  Attention: Exceptions to WFL  Sustained Attention: Impaired  Short-Term Memory: Impaired  Problem Solving: Exceptions to WFL  Complex Functional Tasks: Impaired  Safety/Judgement: Exceptions to WFL  Complex Functional Tasks: Maximal  Novel Situations: Maximal  Routine Tasks: Moderate  Unable to Self-Monitor and Self-Correct Consistently: Moderate  Insight: Moderate  Impulsive: Moderately  Planning: Reduced planning skills  Processing Speed: Delayed  Postural Control:  Static Sitting Balance  Static Sitting-Comment/Number of Minutes: FAIR  Dynamic Sitting Balance  Dynamic Sitting-Comments: FAIR-  Static Standing Balance  Static Standing-Comment/Number of Minutes: POOR  Dynamic Standing Balance  Dynamic Standing-Comments: POOR          Treatments:  Therapeutic Exercise  Therapeutic Exercise Performed:  (SUPINE MIN A LLE AND MAX A RLE HS, HIP ABDUCTION, CTIV E AP, GS/QS, 10 REPS, FATIGUED W/ ACTIVITY, SEATED EOB 5 REPS KICKS ACTIVE LLE ADN MOD A RLE, ACTIVE 10 REPS AP)         Balance/Neuromuscular Re-Education  Balance/Neuromuscular Re-Education Activity Performed:  (SAT EOB FOR 15 MIN SBA FOR EX AND ADL)    Bed Mobility  Bed Mobility:  (SUPIEN<>SIT MOD X2, SAT EOB 15  MIN SBA, C/O LIGHTHEADED WHEN SEH BEGAN TO GET FATIGUED)    Ambulation/Gait Training  Ambulation/Gait Training Performed:  (FWW  MOD X2 WBAT RLE, 2 FT TO R TOWARDS HOB, LEGSUNSTEADY MOD RETROLEAN C/OLIGHTHEADED)  Transfers  Transfer:  (FWW WBAT RLE, MOD X2 A STOOD FOR 45 SEC C/O LIGHTHEADED HAD TO SIT SAT FOR 2 MIN THEN STOOD AGAIN, MOD X2 A, M OD RETROLEAN MARCHED IN PLACE 5 REPS THEN TOOK STEPS, STILL LIGHTHEADED)    Outcome Measures:  [facility] Basic Mobility  Turning from your back to your side while in a flat bed without using bedrails: A lot  Moving from lying on your back  to sitting on the side of a flat bed without using bedrails: A lot  Moving to and from bed to chair (including a wheelchair): A lot  Standing up from a chair using your arms (e.g. wheelchair or bedside chair): A lot  To walk in hospital room: Total  Climbing 3-5 steps with railing: Total  Basic Mobility - Total Score: 10    Education Documentation  Mobility Training, taught by Kailee Balbuena PT at 2/21/2024  5:02 PM.  Learner: Patient  Readiness: Acceptance  Method: Explanation  Response: Needs Reinforcement    Education Comments  No comments found.           Encounter Problems       Encounter Problems (Active)       Mobility       STG - Patient will ambulate (Not Progressing)       Start:  02/20/24    Expected End:  03/01/24       FWW WBAT RLE, MIN X1 50-75 FT         STRENGTHENING (Progressing)       Start:  02/20/24    Expected End:  03/12/24       20+ REPS AROM RLE AND RROM ON LLE INCREASING STRENGTH TO STABILIZE GAIT            Transfers       STG - Patient to transfer to and from sit to supine (Progressing)       Start:  02/20/24    Expected End:  02/29/24       MIN A USING RAILS         STG - Patient will transfer sit to and from stand (Not Progressing)       Start:  02/20/24    Expected End:  02/28/24       RLE WBAT FWW, MIN A USING PROPER TECHNIQUE

## 2024-02-21 NOTE — PROGRESS NOTES
"      Nephrology Progress Note      Nephrology following for THERESA.   Events over night: none    Seen in room. Comfortable and alert with family present. Eating and drinking ok. Denies SOB, CP, nausea.       /76 (BP Location: Right arm, Patient Position: Lying)   Pulse 100   Temp 37.7 °C (99.9 °F) (Temporal)   Resp 18   Ht 1.6 m (5' 2.99\")   Wt 62.7 kg (138 lb 3.7 oz)   SpO2 95%   BMI 24.49 kg/m²     Input / Output:  24 HR:   Intake/Output Summary (Last 24 hours) at 2/21/2024 1152  Last data filed at 2/21/2024 0449  Gross per 24 hour   Intake 120 ml   Output 625 ml   Net -505 ml       Physical Exam   Alert and oriented x 3 NAD  Neck: no JVD  CV: RRR  Lungs: CTA bilaterally  Abd: soft, NT, ND   Ext: no lower extremity edema    Scheduled medications  aspirin, 81 mg, oral, Daily  docusate sodium, 100 mg, oral, BID  insulin lispro, 0-10 Units, subcutaneous, TID with meals  metoprolol succinate XL, 25 mg, oral, Daily  polyethylene glycol, 17 g, oral, Daily  [Held by provider] simvastatin, 40 mg, oral, Nightly  [Held by provider] valsartan 320 mg, hydroCHLOROthiazide 25 mg for Diovan HCT, , oral, Daily      Continuous medications     PRN medications  PRN medications: acetaminophen **OR** acetaminophen **OR** acetaminophen, dextromethorphan-guaifenesin, dextrose 10 % in water (D10W), dextrose, glucagon, guaiFENesin, HYDROmorphone, LORazepam, ondansetron **OR** ondansetron, oxyCODONE, oxygen   Results from last 7 days   Lab Units 02/21/24  0352 02/20/24  0426   SODIUM mmol/L 139 138   POTASSIUM mmol/L 3.3* 3.5   CHLORIDE mmol/L 107 105   CO2 mmol/L 23 24   BUN mg/dL 33* 31*   CREATININE mg/dL 2.21* 2.16*   CALCIUM mg/dL 7.0* 6.9*   PROTEIN TOTAL g/dL  --  5.2*   BILIRUBIN TOTAL mg/dL  --  0.6   ALK PHOS U/L  --  50   ALT U/L  --  15   AST U/L  --  101*   GLUCOSE mg/dL 93 116*      Results from last 7 days   Lab Units 02/21/24  0352 02/20/24  0426 02/19/24  0252   MAGNESIUM mg/dL 2.01 2.00 1.55*      Results from " last 7 days   Lab Units 02/21/24  0352 02/20/24  0426 02/19/24  0252   WBC AUTO x10*3/uL 8.5 8.2 9.1   HEMOGLOBIN g/dL 7.4* 7.2* 7.1*   HEMATOCRIT % 23.5* 22.8* 22.6*   PLATELETS AUTO x10*3/uL 178 163 212        Assessment & Plan:   Patient with hx including DM, HLD, HTN who presented with fall and R hip pain, found to have R basciervical/intertrochanteric femoral fracture. Underwent Hp fracture fixation on 2/18, but hospitalization complicated by cardiac arrest, noted ROSC achieved after 4 round of CPR. Nephrology following for THERESA/CKD.     THERESA/CKD  -baseline Scr ~1.7mg/dl  -recent Scr increased at 2.16mg/dl, in setting of recent cardiac arrest with some ischemic ATN   -non-oliguric UOP recently, Cr plateau 2.21 today     HTN  -BP controlled recently     Anemia  -recent Hgb low at 7.2  -no role for YUDITH in setting of THERESA  -transfuse PRN per primary    Hypokalemia- Being replaced   Plan:  -monitor renal function, electrolytes  -agree with holding valsartan, HCTZ  -no need for RRT  -Replace K PO  -No need for IVF's as long a PO intake remains good       Please message me through EPIC chat with any questions or concerns.     Mateusz Seaman PA-C  2/21/2024  11:52 AM     [facility]    [address], [address]   [place] [address]  Office: [phone]

## 2024-02-21 NOTE — PROGRESS NOTES
Kathy Pappas is a 83 y.o. female on day 3 of admission presenting with Fall, initial encounter.    Subjective   Kathy Pappas is an 83 y.o. female with history of diabetes, hypertension, and hyperlipidemia presenting with a fall and right hip pain.  She states that she was coming downstairs tonight when she slipped on the penultimate step and fell on her right side. She bruised both knees and hit forehead on the floor.  She was brought to the ED by EMS and hip imaging study yielded a comminuted right basicervical/intertrochanteric femoral fracture. Pt denies having any antecedent or associated lightheadedness, chest pain, palpitations or SOB. There was no LOC. No HA, visual changes, nausea or vomiting. She reports being fairly active and is able to do >4METS. She denies history of structural heart disease including MI.     2/19: After surgery patient became unresponsive and arrested. ROSC was achieved after 4 rounds of CPR/Epi. Seizure active was noted as well. By the time I saw there this AM she was extubated and agitated.     2/20: She's not yet back to her mentation baseline but she is improved from yesterday.     2/21: Mentation continues to slowly improve.         Objective     Constitutional:       General: She is not in acute distress.     Appearance: Normal appearance.   HENT:      Head: Normocephalic and atraumatic.      Nose: Nose normal.      Mouth/Throat:      Mouth: Mucous membranes are dry.      Pharynx: Oropharynx is clear. No oropharyngeal exudate or posterior oropharyngeal erythema.   Eyes:      General: No scleral icterus.        Right eye: No discharge.         Left eye: No discharge.      Conjunctiva/sclera: Conjunctivae normal.   Cardiovascular:      Rate and Rhythm: Normal rate and regular rhythm.      Heart sounds: Normal heart sounds. No murmur heard.  Pulmonary:      Breath sounds: Normal breath sounds. No wheezing, rhonchi or rales.   Abdominal:      Palpations: Abdomen is  "soft. There is no mass.      Tenderness: There is no abdominal tenderness. There is no right CVA tenderness, guarding or rebound.   Musculoskeletal:      Cervical back: Neck supple.      Right lower leg: Surgical dressing clean and dry      Left lower leg: No edema.     Lymphadenopathy:      Cervical: No cervical adenopathy.   Skin:     General: Skin is warm and dry.      Capillary Refill: Capillary refill takes less than 2 seconds.      Findings: Bruising present.      Comments: Bruises seen on both knees   Neurological:      General: No focal deficit present.   Psychiatric:        agitated      Last Recorded Vitals  Blood pressure 155/64, pulse 89, temperature 36.8 °C (98.3 °F), temperature source Temporal, resp. rate 18, height 1.6 m (5' 2.99\"), weight 62.7 kg (138 lb 3.7 oz), SpO2 94 %.  Intake/Output last 3 Shifts:  I/O last 3 completed shifts:  In: 320 (5.1 mL/kg) [P.O.:120; I.V.:200 (3.2 mL/kg)]  Out: 1575 (25.1 mL/kg) [Urine:1575 (0.7 mL/kg/hr)]  Weight: 62.7 kg     Relevant Results                             Assessment/Plan     Cardiac Arrest   -ROSC achieved after 4 rounds of CPR on 2/18  -she is now extubated and awake, delirium improving/resolved    Seizures   -before cardiac arrest patient was noted to have ?seizure activity   -CTH without acute process   -EEG: No epileptiform discharges or lateralizing signs.   -Keppra started by neuro  -MRI findings as below with possible seizure foci.  -Consult Neuro    Cerebellar hemorrhage   -as noted on 2/21 MRI brain   -I spoke with Dr. Palomo from [facility] at [facility] and he stated there is no intervention necessary and that patient is OK to start baby ASA now for secondary stroke ppx.  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 Palomo from [facility] at [facility]  -awaiting normalization of LFTs before starting HI statin     Acute Hypoxemic Respiratory " Failure   -during cardiac arrest   -Patient is now extubated and on NC    Fall with right hip fracture  -S/P ORIF on 2/18  -stopped Lovenox with cerebellar hemorrhage. We will need to use ASA for stroke ppx.   -WBAT  -Ortho consulting     Hypomagnesemia  -replete PRN     Type II DM  -SSI IP      HTN  -home meds     CKD stage 3  Monitor renal function        Keshav Hubbard MD PhD

## 2024-02-21 NOTE — CARE PLAN
The patient's goals for the shift include Pt. will have a safe, restful and uneventful evening.    The clinical goals for the shift include Pt. will have adequate relief of pain this shift      Problem: Discharge Planning  Goal: Discharge to home or other facility with appropriate resources  Outcome: Progressing     Problem: Chronic Conditions and Co-morbidities  Goal: Patient's chronic conditions and co-morbidity symptoms are monitored and maintained or improved  Outcome: Progressing     Problem: Fall/Injury  Goal: Not fall by end of shift  Outcome: Progressing  Goal: Be free from injury by end of the shift  Outcome: Progressing  Goal: Verbalize understanding of personal risk factors for fall in the hospital  Outcome: Progressing  Goal: Verbalize understanding of risk factor reduction measures to prevent injury from fall in the home  Outcome: Progressing  Goal: Use assistive devices by end of the shift  Outcome: Progressing  Goal: Pace activities to prevent fatigue by end of the shift  Outcome: Progressing     Problem: Pain  Goal: Takes deep breaths with improved pain control throughout the shift  Outcome: Progressing  Goal: Turns in bed with improved pain control throughout the shift  Outcome: Progressing  Goal: Walks with improved pain control throughout the shift  Outcome: Progressing  Goal: Performs ADL's with improved pain control throughout shift  Outcome: Progressing  Goal: Participates in PT with improved pain control throughout the shift  Outcome: Progressing  Goal: Free from opioid side effects throughout the shift  Outcome: Progressing  Goal: Free from acute confusion related to pain meds throughout the shift  Outcome: Progressing     Problem: Diabetes  Goal: Achieve decreasing blood glucose levels by end of shift  Outcome: Progressing  Goal: Increase stability of blood glucose readings by end of shift  Outcome: Progressing  Goal: Decrease in ketones present in urine by end of shift  Outcome:  Progressing  Goal: Maintain electrolyte levels within acceptable range throughout shift  Outcome: Progressing  Goal: No changes in neurological exam by end of shift  Outcome: Progressing  Goal: Learn about and adhere to nutrition recommendations by end of shift  Outcome: Progressing  Goal: Vital signs within normal range for age by end of shift  Outcome: Progressing  Goal: Increase self care and/or family involovement by end of shift  Outcome: Progressing  Goal: Receive DSME education by end of shift  Outcome: Progressing     Problem: Skin  Goal: Decreased wound size/increased tissue granulation at next dressing change  Outcome: Progressing  Goal: Participates in plan/prevention/treatment measures  Outcome: Progressing  Goal: Prevent/manage excess moisture  Outcome: Progressing  Goal: Prevent/minimize sheer/friction injuries  Outcome: Progressing  Goal: Promote/optimize nutrition  Outcome: Progressing  Goal: Promote skin healing  Outcome: Progressing     Problem: Safety - Medical Restraint  Goal: Remains free of injury from restraints (Restraint for Interference with Medical Device)  Outcome: Progressing  Goal: Free from restraint(s) (Restraint for Interference with Medical Device)  Outcome: Progressing

## 2024-02-21 NOTE — PROGRESS NOTES
Discussed with Dr Hubbard, possibly ready for DC Friday/Saturday depending on further workup need for bleed. Will continue to follow.

## 2024-02-22 NOTE — PROGRESS NOTES
"  Kathy Pappas is a 83 y.o. female on day 4 of admission presenting with Fall, initial encounter.    Subjective   Kathy Pappas is an 83 y.o. female with history of diabetes, hypertension, and hyperlipidemia presenting with a fall and right hip pain.  She states that she was coming downstairs tonight when she slipped on the penultimate step and fell on her right side. She bruised both knees and hit forehead on the floor.  She was brought to the ED by EMS and hip imaging study yielded a comminuted right basicervical/intertrochanteric femoral fracture. Pt denies having any antecedent or associated lightheadedness, chest pain, palpitations or SOB. There was no LOC. No HA, visual changes, nausea or vomiting. She reports being fairly active and is able to do >4METS. She denies history of structural heart disease including MI.     2/19: After surgery patient became unresponsive and arrested. ROSC was achieved after 4 rounds of CPR/Epi. Seizure active was noted as well. By the time I saw there this AM she was extubated and agitated.     2/20: She's not yet back to her mentation baseline but she is improved from yesterday.     2/21: Mentation continues to slowly improve.  2/22: Patient was evaluated this morning, awake and alert, no apparent focal neurological deficit.         Objective   Patient is awake and orient, not in apparent distress  Eyes: PERRLA, no conjunctival congestion  Chest: Bilateral Air entry, no crackles or wheezing  Heart: s1S2 regular, no murmur  Abdomen: Soft, non tender, BS present  Ext:      Last Recorded Vitals  Blood pressure 124/77, pulse 90, temperature 36.9 °C (98.4 °F), temperature source Temporal, resp. rate 18, height 1.6 m (5' 2.99\"), weight 62.5 kg (137 lb 12.6 oz), SpO2 96 %.  Intake/Output last 3 Shifts:  I/O last 3 completed shifts:  In: 240 (3.8 mL/kg) [P.O.:240]  Out: 1850 (29.6 mL/kg) [Urine:1850 (0.8 mL/kg/hr)]  Weight: 62.5 kg     Relevant Results           This patient " currently has cardiac telemetry ordered; if you would like to modify or discontinue the telemetry order, click here to go to the orders activity to modify/discontinue the order.                 Assessment/Plan     Cardiac Arrest   -ROSC achieved after 4 rounds of CPR on 2/18  -she is now extubated and awake, delirium improving/resolved  -PT/OT on board  -Discharge planning to ECF for rehabilitation    Seizures   -before cardiac arrest patient was noted to have ?seizure activity   -CTH without acute process   -EEG: No epileptiform discharges or lateralizing signs.   -Keppra started by neuro  -MRI findings as below with possible seizure foci.  -Neurology consulted-recommends continuing Keppra 500 mg twice daily    Cerebellar hemorrhage   -as noted on 2/21 MRI brain   -I spoke with Dr. Palomo from NSGY at [facility] and he stated there is no intervention necessary and that patient is OK to start baby ASA now for secondary stroke ppx.  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 Palomo from NSGY at [facility]  -awaiting normalization of LFTs before starting HI statin     Acute Hypoxemic Respiratory Failure   -during cardiac arrest   -Patient is now extubated and on NC    Fall with right hip fracture  -S/P ORIF on 2/18  -stopped Lovenox with cerebellar hemorrhage. We will need to use ASA for stroke ppx.   -WBAT  -Ortho consulting     Hypomagnesemia  -replete PRN     Type II DM  -SSI IP      HTN  -home meds     CKD stage 3  Monitor renal function        Annel Fleming MD

## 2024-02-22 NOTE — PROGRESS NOTES
"      Nephrology Progress Note      Nephrology following for THERESA.        Indwelling Stevenson just removed.  She has external Stevenson now  Noted MRI and CT head results  Patient mentating okay        /77 (BP Location: Right arm, Patient Position: Sitting)   Pulse 90   Temp 36.9 °C (98.4 °F) (Temporal)   Resp 18   Ht 1.6 m (5' 2.99\")   Wt 62.5 kg (137 lb 12.6 oz)   SpO2 96%   BMI 24.41 kg/m²     Input / Output:  24 HR:   Intake/Output Summary (Last 24 hours) at 2/22/2024 1311  Last data filed at 2/22/2024 1140  Gross per 24 hour   Intake 120 ml   Output 2350 ml   Net -2230 ml         Physical Exam   Alert and oriented x 3 NAD  Neck: no JVD  CV: RRR  Lungs: CTA bilaterally  Abd: soft, NT, ND   Ext: no lower extremity edema    Scheduled medications  aspirin, 81 mg, oral, Daily  docusate sodium, 100 mg, oral, BID  insulin lispro, 0-10 Units, subcutaneous, TID with meals  iron sucrose, 200 mg, intravenous, Daily  levETIRAcetam, 500 mg, oral, BID  metoprolol succinate XL, 25 mg, oral, Daily  polyethylene glycol, 17 g, oral, Daily  [Held by provider] simvastatin, 40 mg, oral, Nightly  [Held by provider] valsartan 320 mg, hydroCHLOROthiazide 25 mg for Diovan HCT, , oral, Daily      Continuous medications     PRN medications  PRN medications: acetaminophen **OR** acetaminophen **OR** acetaminophen, dextromethorphan-guaifenesin, dextrose 10 % in water (D10W), dextrose, glucagon, guaiFENesin, HYDROmorphone, ondansetron **OR** ondansetron, oxyCODONE, oxygen   Results from last 7 days   Lab Units 02/22/24  0509   SODIUM mmol/L 139   POTASSIUM mmol/L 4.0   CHLORIDE mmol/L 108*   CO2 mmol/L 25   BUN mg/dL 34*   CREATININE mg/dL 2.05*   CALCIUM mg/dL 7.6*   PROTEIN TOTAL g/dL 5.6*   BILIRUBIN TOTAL mg/dL 0.7   ALK PHOS U/L 64   ALT U/L 3*   AST U/L 32   GLUCOSE mg/dL 107*        Results from last 7 days   Lab Units 02/22/24  0509 02/21/24  0352 02/20/24  0426   MAGNESIUM mg/dL 1.84 2.01 2.00        Results from last 7 days "   Lab Units 02/22/24  0509 02/21/24  0352 02/20/24  0426   WBC AUTO x10*3/uL 7.7 8.5 8.2   HEMOGLOBIN g/dL 7.3* 7.4* 7.2*   HEMATOCRIT % 23.2* 23.5* 22.8*   PLATELETS AUTO x10*3/uL 194 178 163          Assessment & Plan:   Patient with hx including DM, HLD, HTN who presented with fall and R hip pain, found to have R basciervical/intertrochanteric femoral fracture. Underwent Hp fracture fixation on 2/18, but hospitalization complicated by cardiac arrest, noted ROSC achieved after 4 round of CPR. Nephrology following for THERESA/CKD.     THERESA/CKD  -baseline Scr ~1.7mg/dl  -recent Scr increased at 2.16mg/dl, in setting of recent cardiac arrest with some ischemic ATN   -non-oliguric UOP recently, Cr plateau improving now.     HTN  -BP controlled recently     Anemia  -recent Hgb low at 7.2  -no role for YUDITH in setting of THERESA  -transfuse PRN per primary    Hypokalemia- Being replaced prn    Plan:  -monitor renal function, electrolytes  -holding valsartan, HCTZ  -no need for RRT  -Replace K PO  -No need for IVF's as long a PO intake remains good       Please message me through EPIC chat with any questions or concerns.     Hortencia Montoya DO  2/22/2024  1:11 PM     [facility]    [address], [address]   [place] [id number]  Office: [phone]

## 2024-02-22 NOTE — CARE PLAN
Problem: Discharge Planning  Goal: Discharge to home or other facility with appropriate resources  Outcome: Progressing   The patient's goals for the shift include Pt. will have a safe, restful and uneventful evening.    The clinical goals for the shift include remain free of fall/injury

## 2024-02-22 NOTE — CARE PLAN
The patient's goals for the shift include Pt. will have a safe, restful and uneventful evening.    The clinical goals for the shift include remain free of fall/injury      Problem: Discharge Planning  Goal: Discharge to home or other facility with appropriate resources  Outcome: Progressing

## 2024-02-22 NOTE — PROGRESS NOTES
Occupational Therapy    OT Treatment    Patient Name: Kathy Pappas  MRN: [id number]  Today's Date: 2/22/2024  Time Calculation  Start Time: 1331  Stop Time: 1355  Time Calculation (min): 24 min         Assessment:  OT Assessment: Pt progressed to participating in functional transfers compelting with mod A x2. Pt requires verbal cues for safety, sequencing and transfer technique.     Plan:  Treatment Interventions: ADL retraining, Functional transfer training, UE strengthening/ROM, Endurance training, Cognitive reorientation, Patient/family training, Equipment evaluation/education, Compensatory technique education  OT Frequency: 4 times per week  Equipment Recommended upon Discharge:  (TBD)  OT Recommended Transfer Status: Moderate assist, Assist of 2  OT - OK to Discharge: Yes (okay to dc to next level of care once medically cleared)  Treatment Interventions: ADL retraining, Functional transfer training, UE strengthening/ROM, Endurance training, Cognitive reorientation, Patient/family training, Equipment evaluation/education, Compensatory technique education    Subjective   Previous Visit Info:  OT Last Visit  OT Received On: 02/22/24  General:  General  Prior to Session Communication: Bedside nurse  Patient Position Received: Bed, 3 rail up, Alarm on  General Comment: Pt agreeable to therapy with encouragment. Pt family present for tx session.       Pain:  Pain Assessment  Pain Assessment: 0-10  Pain Score: 0 - No pain    Objective    Cognition:  Cognition  Overall Cognitive Status: Impaired    Functional Standing Tolerance:  Activity: Pt completed x3 static stands tolerating for 1-2 minutes with min A x2.  Bed Mobility/Transfers: Bed Mobility  Bed Mobility: Yes  Bed Mobility 1  Bed Mobility 1: Supine to sitting  Level of Assistance 1: Moderate assistance, Moderate verbal cues (x2)    Transfers  Transfer: Yes  Transfer 1  Technique 1: Sit to stand, Stand to sit  Transfer Device 1: Walker  Transfer Level of  Assistance 1: +2, Moderate verbal cues, Minimum assistance  Transfers 2  Transfer From 2: Bed to  Transfer to 2: Chair with arms  Technique 2: Sit to stand, Stand to sit  Transfer Device 2: Walker  Transfer Level of Assistance 2: Minimum assistance, +2, Moderate verbal cues            Therapy/Activity:      Therapeutic Activity  Therapeutic Activity Performed: Yes  Therapeutic Activity 1: Pt participated in functional mobiliy <min household distance using FWW with mod A x2.        Outcome Measures:[facility] Daily Activity  Putting on and taking off regular lower body clothing: A lot  Bathing (including washing, rinsing, drying): A lot  Putting on and taking off regular upper body clothing: A lot  Toileting, which includes using toilet, bedpan or urinal: A lot  Taking care of personal grooming such as brushing teeth: A little  Eating Meals: A little  Daily Activity - Total Score: 14        Education Documentation  Body Mechanics, taught by NEIDA Kendrick at 2/22/2024  3:49 PM.  Learner: Patient  Readiness: Acceptance  Method: Explanation  Response: Needs Reinforcement    Education Comments  No comments found.           Goals:  Encounter Problems       Encounter Problems (Active)       ADLs       Patient will perform UB and LB bathing  with minimal assist  level of assistance. (Progressing)       Start:  02/20/24    Expected End:  03/05/24            Patient with complete upper body dressing with contact guard assist level of assistance donning and doffing all UE clothes with no adaptive equipment while edge of bed  (Progressing)       Start:  02/20/24    Expected End:  03/05/24                 TRANSFERS       Patient will complete sit to stand transfer with minimal assist  level of assistance and front wheeled walker in order to improve safety and prepare for out of bed mobility. (Progressing)       Start:  02/20/24    Expected End:  03/05/24            OT GOAL 3 (Progressing)       Start:  02/20/24    Expected  End:  03/05/24

## 2024-02-22 NOTE — PROGRESS NOTES
S: Doing well, pain well-controlled.  Again appears to be a bit confused, but is AOx3 on questioning.  New findings on MRI yesterday, details below.          O  VS:  Temp:  [35.8 °C (96.4 °F)-37.7 °C (99.9 °F)] 36.4 °C (97.6 °F)  Heart Rate:  [] 98  Resp:  [18] 18  BP: (134-147)/(60-81) 141/81    Gen: NAD  Focused exam of operative extremity:  -Proximal dressing minor stains, distal dressing was changed 2/20/2024 and only has 1 minor spot that is unchanged  -SILT in S/S/SP/DP/T/F distributions  -Able to flex Q, TA, GS  -Palpable DP pulse, symmetric to contralateral  -SCDs in place    Drain  Na  Micro  Na  PT/OT  Okay to discharge when medically cleared  Labs (pertinent)  Hgb 7.3, stable from yesterday  Imaging  No new imaging today          A&P: Kathy Pappas is a 83 y.o. female s/p open reduction internal fixation of right proximal femur fracture on 2/18/2024.  Appreciate care from internal medicine primary team  Appreciate neurology consult  Unclear if truly had seizure prior to arrest  CT head (both initial 2/17/24 and repeat 2/19/24): No acute abnormalities  EEG 2/19/24: Neg for seizures  MRI brain 2/21/24: Time indeterminant possible hemorrhage in R cerebellar region. Discussed with NSGY, who recommended no surgical intervention but starting 81mg asa for secondary stroke prevention with acute punctate stroke found.  With hemosiderin and hemorrhage findings, will start on antiepileptic medication  Repeat head CT scheduled for today to eval for any continued bleeding.  Abx prophylaxis: 24hr periop completed  Analgesia: Multimodal with breakthrough   Consults: Appreciate therapy, ASHLEY, case mgmt  Dressing: Aquacel/Mepilex for 7d. Patient will remove at home then apply daily dry dressing PRN  DVT ppx: Early mobilization, SCDs, pharmacologic (prefer LVX 6wks)  BLE swelling: Compression hose  Activity: WBAT  Diet: Routine. DC IVF when adequate PO intake  DC: Pending medical stabilization. F/u with me 2wks  after DC.

## 2024-02-22 NOTE — PROGRESS NOTES
Physical Therapy    Physical Therapy Treatment    Patient Name: Kathy Pappas  MRN: [id number]  Today's Date: 2/23/2024  Time Calculation  Start Time: 1330  Stop Time: 1355  Time Calculation (min): 25 min       Assessment/Plan   PT Assessment  PT Assessment Results: Decreased strength, Decreased endurance, Impaired balance, Decreased range of motion, Decreased safety awareness  Rehab Prognosis: Fair  Evaluation/Treatment Tolerance: Patient limited by fatigue  End of Session Communication: Bedside nurse  Assessment Comment: pt agreeable to therapy. pt with family present. pt requried cues on hand placement with transfer. pt unsteady with amb. pt required a rest break in between amb. pt required cues on FWW and foot sequencing. pt moved at a slow pace. cues on safety.  End of Session Patient Position: Alarm on, Up in chair  PT Plan  Inpatient/Swing Bed or Outpatient: Inpatient  PT Plan  Treatment/Interventions: Bed mobility, Transfer training, Gait training, Strengthening, Endurance training  PT Plan: Skilled PT  PT Frequency: 5 times per week (1-2X/DAY)  PT Discharge Recommendations: Moderate intensity level of continued care  Equipment Recommended upon Discharge:  (TBD)  PT Recommended Transfer Status: Assist x2 (FWW)  PT - OK to Discharge: Yes (WHEN MEDICALLY CLEARED)      General Visit Information:   PT  Visit  PT Received On: 02/22/24       Subjective   Precautions:     Vital Signs:       Objective   Pain:  Pain Assessment  Pain Score: 0 - No pain  Cognition:  Cognition  Overall Cognitive Status: Impaired  Arousal/Alertness: Delayed responses to stimuli       Treatments:  Therapeutic Exercise  Therapeutic Exercise Performed:  (LAQ, ankle pumps x 12 each)    Bed Mobility  Bed Mobility: Yes  Bed Mobility 1  Bed Mobility 1: Supine to sitting  Level of Assistance 1: Moderate assistance (x2)    Ambulation/Gait Training  Ambulation/Gait Training Performed: Yes  Ambulation/Gait Training 1  Surface 1: Level  tile  Device 1: Rolling walker  Assistance 1: Moderate assistance (x2)  Comments/Distance (ft) 1: 3,2,4  Transfer 1  Technique 1: Sit to stand  Transfer Device 1: Walker  Transfer Level of Assistance 1: Minimum assistance (x2)  Trials/Comments 1: 1x EOB, 2x chair    Outcome Measures:  [facility] Basic Mobility  Turning from your back to your side while in a flat bed without using bedrails: A lot  Moving from lying on your back to sitting on the side of a flat bed without using bedrails: A lot  Moving to and from bed to chair (including a wheelchair): A lot  Standing up from a chair using your arms (e.g. wheelchair or bedside chair): A lot  To walk in hospital room: A lot  Climbing 3-5 steps with railing: Total  Basic Mobility - Total Score: 11    Education Documentation  No documentation found.  Education Comments  No comments found.        OP EDUCATION:       Encounter Problems       Encounter Problems (Active)       Mobility       STG - Patient will ambulate (Progressing)       Start:  02/20/24    Expected End:  03/01/24       FWW WBAT RLE, MIN X1 50-75 FT         STRENGTHENING (Progressing)       Start:  02/20/24    Expected End:  03/12/24       20+ REPS AROM RLE AND RROM ON LLE INCREASING STRENGTH TO STABILIZE GAIT            Transfers       STG - Patient to transfer to and from sit to supine (Progressing)       Start:  02/20/24    Expected End:  02/29/24       MIN A USING RAILS         STG - Patient will transfer sit to and from stand (Progressing)       Start:  02/20/24    Expected End:  02/28/24       RLE WBAT FWW, MIN A USING PROPER TECHNIQUE

## 2024-02-22 NOTE — PROGRESS NOTES
"Kathy Pappas is a 83 y.o. female on day 4 of admission presenting with Fall, initial encounter.      Subjective   Pt seen again this afternoon. Family at bedside including 2 granddaughters and daughter. Pt resting in bed. Alert and overtly oriented.     Repeat HCT today reviewed. Hemorrhage is less conspicuous on CT than was on MRI.        Objective     Last Recorded Vitals  Blood pressure 124/77, pulse 90, temperature 36.9 °C (98.4 °F), temperature source Temporal, resp. rate 18, height 1.6 m (5' 2.99\"), weight 62.5 kg (137 lb 12.6 oz), SpO2 96 %.    Physical Exam  Psychiatric:         Speech: Speech normal.       Neurological Exam  Mental Status  Awake, alert and oriented to person, place and time. Speech is normal. Language is fluent with no aphasia.    Cranial Nerves  CN II-XII grossly intact. .    Motor  Normal muscle bulk throughout. No fasciculations present. Normal muscle tone. No abnormal involuntary movements.  At least antigravity throughout.      Relevant Results    Scheduled medications  aspirin, 81 mg, oral, Daily  docusate sodium, 100 mg, oral, BID  insulin lispro, 0-10 Units, subcutaneous, TID with meals  iron sucrose, 200 mg, intravenous, Daily  levETIRAcetam, 500 mg, oral, BID  metoprolol succinate XL, 25 mg, oral, Daily  polyethylene glycol, 17 g, oral, Daily  [Held by provider] simvastatin, 40 mg, oral, Nightly  [Held by provider] valsartan 320 mg, hydroCHLOROthiazide 25 mg for Diovan HCT, , oral, Daily      Continuous medications     PRN medications  PRN medications: acetaminophen **OR** acetaminophen **OR** acetaminophen, dextromethorphan-guaifenesin, dextrose 10 % in water (D10W), dextrose, glucagon, guaiFENesin, HYDROmorphone, ondansetron **OR** ondansetron, oxyCODONE, oxygen    Results for orders placed or performed during the hospital encounter of 02/17/24 (from the past 24 hour(s))   POCT GLUCOSE   Result Value Ref Range    POCT Glucose 153 (H) 74 - 99 mg/dL   POCT GLUCOSE   Result " Value Ref Range    POCT Glucose 169 (H) 74 - 99 mg/dL   Magnesium   Result Value Ref Range    Magnesium 1.84 1.60 - 2.40 mg/dL   Basic Metabolic Panel   Result Value Ref Range    Glucose 107 (H) 74 - 99 mg/dL    Sodium 139 136 - 145 mmol/L    Potassium 4.0 3.5 - 5.3 mmol/L    Chloride 108 (H) 98 - 107 mmol/L    Bicarbonate 25 21 - 32 mmol/L    Anion Gap 10 10 - 20 mmol/L    Urea Nitrogen 34 (H) 6 - 23 mg/dL    Creatinine 2.05 (H) 0.50 - 1.05 mg/dL    eGFR 24 (L) >60 mL/min/1.73m*2    Calcium 7.6 (L) 8.6 - 10.3 mg/dL   CBC   Result Value Ref Range    WBC 7.7 4.4 - 11.3 x10*3/uL    nRBC 0.0 0.0 - 0.0 /100 WBCs    RBC 2.43 (L) 4.00 - 5.20 x10*6/uL    Hemoglobin 7.3 (L) 12.0 - 16.0 g/dL    Hematocrit 23.2 (L) 36.0 - 46.0 %    MCV 96 80 - 100 fL    MCH 30.0 26.0 - 34.0 pg    MCHC 31.5 (L) 32.0 - 36.0 g/dL    RDW 14.0 11.5 - 14.5 %    Platelets 194 150 - 450 x10*3/uL   Hepatic function panel   Result Value Ref Range    Albumin 2.9 (L) 3.4 - 5.0 g/dL    Bilirubin, Total 0.7 0.0 - 1.2 mg/dL    Bilirubin, Direct 0.1 0.0 - 0.3 mg/dL    Alkaline Phosphatase 64 33 - 136 U/L    ALT 3 (L) 7 - 45 U/L    AST 32 9 - 39 U/L    Total Protein 5.6 (L) 6.4 - 8.2 g/dL   Iron and TIBC   Result Value Ref Range    Iron 26 (L) 35 - 150 ug/dL    UIBC 134 110 - 370 ug/dL    TIBC 160 (L) 240 - 445 ug/dL    % Saturation 16 (L) 25 - 45 %   Folate   Result Value Ref Range    Folate, Serum 8.0 >5.0 ng/mL   POCT GLUCOSE   Result Value Ref Range    POCT Glucose 210 (H) 74 - 99 mg/dL   POCT GLUCOSE   Result Value Ref Range    POCT Glucose 103 (H) 74 - 99 mg/dL     CT head wo IV contrast    Result Date: 2/22/2024  Interpreted By:  Aure Lawton, STUDY: CT HEAD WO IV CONTRAST;  2/22/2024 9:18 am   INDICATION: Signs/Symptoms:MRI brain 2/20 with hemorrhagic findings to R cerebellum, ? new onset seizure post op this admit.   COMPARISON: CT 02/19/2024 and MRI 02/20/2024   ACCESSION NUMBER(S): [id number]   ORDERING CLINICIAN: TINO STALLINGS   TECHNIQUE:  Noncontrast axial CT scan of head was performed. Angled reformats in brain and bone windows were generated. The images were reviewed in bone, brain, blood and soft tissue windows. Coronal and sagittal reformats were provided for review.   FINDINGS: CSF Spaces: There is again prominence of ventricles and sulci compatible with diffuse parenchymal volume loss. There is no extraaxial fluid collection.   Parenchyma:  There are again patchy and confluent areas of diminished attenuation in the subcortical and periventricular white matter. There are small hypodensities within the thalami, basal ganglia, and external capsules which could represent lacunar infarcts or perivascular spaces. Hyperdensities within the basal ganglia and cerebellum are compatible with mineralization. Otherwise, the grey-white differentiation is intact. There is no mass effect or midline shift.   Suspected subarachnoid and/or parenchymal hemorrhage demonstrated on recent MRI in the right cerebellum is much less conspicuous on this examination. There is very vague, subtle heterogeneous attenuation in the right cerebellum on image 7 of 33. Superficial siderosis demonstrated on MRI is not evident on the basis of this examination.   Calvarium: The calvarium is unremarkable.   Paranasal sinuses and mastoids: There is trace mucosal thickening in the left maxillary sinus and opacification of a right anterior ethmoid air cell. The mastoid air cells are clear.       1. Hemorrhage and edema demonstrated on MRI in the right cerebellum is much less conspicuous on this examination. 2. Nonspecific white matter changes may represent small-vessel ischemic disease in a patient of this age. 3. Diffuse parenchymal volume loss.       MACRO: None   Signed by: Aure Lawton 2/22/2024 10:13 AM Dictation workstation:   [id number]    Transthoracic Echo (TTE) Complete    Result Date: 2/21/2024              [address]  [address]      Phone [phone] Fax [phone] TRANSTHORACIC ECHOCARDIOGRAM REPORT  Patient Name:      MONIQUE ROMAN Reading Physician:    [id number] Michael Bloom DO Study Date:        2/21/2024           Ordering Provider:    [id number] MICHAEL BEARDEN MRN/PID:           [id number]            Fellow: Accession#:        [id number]        Nurse:                Annie Nguyen Date of Birth/Age: 1940 / 83      Sonographer:          Na Bermeo RDCS                    years Gender:            F                   Additional Staff: Height:            157.48 cm           Admit Date:           2/17/2024 Weight:            62.60 kg            Admission Status:     Inpatient - Routine BSA / BMI:         1.63 m2 / 25.24     Department Location:  [facility]                    kg/m2 Blood Pressure: 147 /76 mmHg Study Type:    TRANSTHORACIC ECHO (TTE) COMPLETE Diagnosis/ICD: Cerebral Infarction, unspecified-I63.9 Indication:    Cerebrovascular Accident CPT Codes:     Echo Complete w Full Doppler-[id number]  Study Detail: The following Echo studies were performed: 2D, M-Mode, Doppler and               color flow. Definity used as a contrast agent for endocardial               border definition. Total contrast used for this procedure was 2 mL               via IV push.  PHYSICIAN INTERPRETATION: Left Ventricle: Left ventricular systolic function is hyperdynamic, with an estimated ejection fraction of 70%. There are no regional wall motion abnormalities. The left ventricular cavity size is normal. Left ventricular diastolic filling was indeterminate. Basal septal hypertrophy. Left Atrium: The left atrium is normal in size. Right Ventricle: The right ventricle is normal in size. There is normal right ventricular global systolic function. Right Atrium: The right atrium is normal in size. Aortic Valve: The aortic valve is trileaflet. There is no evidence of aortic valve  regurgitation. The peak instantaneous gradient of the aortic valve is 9.5 mmHg. The mean gradient of the aortic valve is 6.0 mmHg. Mitral Valve: The mitral valve is normal in structure. There is mild mitral annular calcification. There is no evidence of mitral valve regurgitation. Tricuspid Valve: The tricuspid valve is structurally normal. There is mild tricuspid regurgitation. The Doppler estimated RVSP is moderately elevated at 59.6 mmHg. Pulmonic Valve: The pulmonic valve is not well visualized. There is physiologic pulmonic valve regurgitation. Pericardium: There is a trivial pericardial effusion. Pleural: There is a moderate left pleural effusion. Aorta: The aortic root is normal. There is mild dilatation of the ascending aorta.  CONCLUSIONS:  1. Left ventricular systolic function is hyperdynamic with a 70% estimated ejection fraction.  2. There is a moderate pleural effusion.  3. Moderately elevated right ventricular systolic pressure. QUANTITATIVE DATA SUMMARY: 2D MEASUREMENTS:                          Normal Ranges: Ao Root d:     3.50 cm   (2.0-3.7cm) LAs:           2.60 cm   (2.7-4.0cm) IVSd:          1.31 cm   (0.6-1.1cm) LVPWd:         1.07 cm   (0.6-1.1cm) LVIDd:         3.54 cm   (3.9-5.9cm) LVIDs:         2.16 cm LV Mass Index: 83.5 g/m2 LV % FS        39.0 % LA VOLUME:                               Normal Ranges: LA Vol A4C:        18.6 ml    (22+/-6mL/m2) LA Vol A2C:        30.9 ml LA Vol BP:         24.7 ml LA Vol Index A4C:  11.4ml/m2 LA Vol Index A2C:  18.9 ml/m2 LA Vol Index BP:   15.1 ml/m2 LA Area A4C:       9.7 cm2 LA Area A2C:       12.1 cm2 LA Major Axis A4C: 4.3 cm LA Major Axis A2C: 4.0 cm RA VOLUME BY A/L METHOD:                       Normal Ranges: RA Area A4C: 10.7 cm2 AORTA MEASUREMENTS:                      Normal Ranges: Ao Sinus, d: 3.50 cm (2.1-3.5cm) Ao STJ, d:   2.41 cm (1.7-3.4cm) Asc Ao, d:   3.75 cm (2.1-3.4cm) LV SYSTOLIC FUNCTION BY 2D PLANIMETRY (MOD):                      Normal Ranges: EF-A4C View: 81.2 % (>=55%) EF-A2C View: 80.6 % EF-Biplane:  81.0 % LV DIASTOLIC FUNCTION:                           Normal Ranges: MV Peak E:    0.77 m/s    (0.7-1.2 m/s) MV Peak A:    0.93 m/s    (0.42-0.7 m/s) E/A Ratio:    0.82        (1.0-2.2) MV e'         0.06 m/s    (>8.0) MV lateral e' 0.06 m/s MV medial e'  0.05 m/s MV A Dur:     114.00 msec E/e' Ratio:   13.95       (<8.0) MITRAL VALVE:                 Normal Ranges: MV DT: 249 msec (150-240msec) AORTIC VALVE:                                   Normal Ranges: AoV Vmax:                1.54 m/s (<=1.7m/s) AoV Peak P.5 mmHg (<20mmHg) AoV Mean P.0 mmHg (1.7-11.5mmHg) LVOT Max Marcio:            0.88 m/s (<=1.1m/s) AoV VTI:                 27.00 cm (18-25cm) LVOT VTI:                17.10 cm LVOT Diameter:           2.00 cm  (1.8-2.4cm) AoV Area, VTI:           1.99 cm2 (2.5-5.5cm2) AoV Area,Vmax:           1.78 cm2 (2.5-4.5cm2) AoV Dimensionless Index: 0.63  RIGHT VENTRICLE: RV Basal 3.35 cm RV Mid   2.78 cm RV Major 6.9 cm TAPSE:   21.7 mm RV s'    0.20 m/s TRICUSPID VALVE/RVSP:                             Normal Ranges: Peak TR Velocity: 3.76 m/s RV Syst Pressure: 59.6 mmHg (< 30mmHg) IVC Diam:         1.81 cm  [id number] Michael Bloom DO Electronically signed on 2024 at 2:19:11 PM  ** Final **     MR brain wo IV contrast    Result Date: 2024  Interpreted By:  Alaina Parra, STUDY: MR BRAIN WO IV CONTRAST   INDICATION: Signs/Symptoms:reported new onset seizure post op followed by full arrest   COMPARISON: Head CT 2024   ACCESSION NUMBER(S): [id number]   ORDERING CLINICIAN: TINO STALLINGS   TECHNIQUE: Multi-planar multi-sequential MR imaging of the brain was performed without intravenous contrast.   FINDINGS: Recent hemorrhage within the folia of the right cerebellar hemisphere with mild associated edema. 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 No mass effect on the 4th ventricle. No cerebellar tonsillar herniation. Restriction in the right cerebellar hemisphere is thought to be secondary to blood products.   Punctate infarct within the right anterior centrum semiovale (series 204, image 224). No associated edema or hemorrhage.   Scattered areas of sulcal susceptibility seen within bilateral frontal sulci and right greater than left parietal lobe. No associated FLAIR signal abnormality in these regions or parenchymal edema. When corresponding to CT, no hyperdensity.   No hydrocephalus.     The visualized intraorbital contents are normal.   Mild mucosal thickening of the left maxillary sinus. Trace bilateral mastoid effusions.   The visualized osseous structures, soft tissues and partially visualized parotid glands appear normal.       Recent hemorrhage extending along the folia of the right cerebellar hemisphere adjacent to - and possibly secondary to - a developmental venous anomaly within the medial aspect of the right cerebellar hemisphere. Hemorrhagic infarct is thought to be less likely. No associated mass effect. 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   Punctate recent infarct in the right anterior centrum semiovale without associated edema or hemorrhage.   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   Critical Finding:  See findings. Notification was initiated on 2/21/2024 at 9:54 am by  Alaina Parra.  (**-OCF-**)   Signed by: Alaina Parra 2/21/2024 9:57 AM Dictation workstation:   [id number]    EEG    IMPRESSION This is a  normal awake and drowsy EEG. No epileptiform discharges, seizures or lateralizing signs seen. This report has been interpreted and electronically signed by    CT head wo IV contrast    Result Date: 2/19/2024  Interpreted By:  Julien Odonnell,  Dotty Child STUDY: CT HEAD WO IV CONTRAST;  2/19/2024 3:29 pm   INDICATION: Signs/Symptoms:Post cardiac arrest.   COMPARISON: 02/17/2024   ACCESSION NUMBER(S): [id number]   ORDERING CLINICIAN: NUHA FERRERA   TECHNIQUE: Axial CT images of the head were obtained without intravenous contrast administration.   FINDINGS: There is moderate brain parenchymal volume loss.   There are patchy nonspecific white matter changes within the cerebral hemispheres bilaterally which while nonspecific, given the patient's age, likely represent sequelae of small-vessel ischemic change.   Additional small scattered hypodensities in are again noted within the subinsular regions, basal ganglia, and thalami bilaterally suggesting incidental mildly prominent perivascular spaces and/or small scattered lacunar infarctions.   No hyperdense acute intracranial hemorrhage is noted.   Dystrophic calcifications are again noted within the bilateral basal ganglia and cerebellar hemispheres stable when compared with the prior study.   There is no midline shift. The suprasellar/basilar cisterns are patent.   Atherosclerotic calcifications are noted along the carotid siphons.   There is lobulated mucosal thickening noted within the inferior maxillary sinuses left greater than right. Minimal mucosal thickening is noted within a few scattered ethmoid air cells.       There is moderate brain parenchymal volume loss.   There are patchy nonspecific white matter changes within the cerebral hemispheres bilaterally which while nonspecific, given the patient's age, likely represent sequelae of small-vessel ischemic change. Additional small scattered hypodensities in are again noted within the  subinsular regions, basal ganglia, and thalami bilaterally suggesting incidental mildly prominent perivascular spaces and/or small scattered lacunar infarctions.   I personally reviewed the images/study and I agree with the findings as stated by Mateusz Gloria MD (Radiology Resident). This study was interpreted at [place], [place].     MACRO: None.   Signed by: Julien Odonnell 2/19/2024 5:12 PM Dictation workstation:   [id number]    ECG 12 lead    Result Date: 2/19/2024  Sinus rhythm Borderline T abnormalities, anterior leads Confirmed by Michael Bloom ([id number]) on 2/19/2024 1:49:43 PM    FL less than 1 hour    Result Date: 2/19/2024  These images are not reportable by radiology and will not be interpreted by  Radiologists.    ECG 12 lead    Result Date: 2/19/2024  Sinus rhythm Low voltage, precordial leads    XR hip right with pelvis when performed 2 or 3 views    Result Date: 2/19/2024  Interpreted By:  Armin Clayton, STUDY: XR HIP RIGHT WITH PELVIS WHEN PERFORMED 2 OR 3 VIEWS; ;  2/18/2024 9:03 pm   INDICATION: Signs/Symptoms:Postop.   COMPARISON: 02/17/2024 radiographs at 9:14 p.m.   ACCESSION NUMBER(S): [id number]   ORDERING CLINICIAN: BRENT MARTÍNEZ   FINDINGS: The right femoral intertrochanteric fracture has been stabilized using short intramedullary stiven and screw. The fracture fragments are in anatomic alignment. The tip of the screw lies within the femoral head.   The overall bony mineralization is decreased. No other fracture or dislocation of the pelvis or left hip is noted. Medial calcific sclerosis of the arteries is present as seen with diabetes or end stage renal disease.       Right intertrochanteric fracture fixation.     MACRO: None   Signed by: Armin Clayton 2/19/2024 7:37 AM Dictation workstation:   [id number]    XR chest 1 view    Result Date: 2/19/2024  Interpreted By:  Baltazar Chapa, STUDY: XR CHEST 1 VIEW;  2/19/2024 2:19 am    INDICATION: Signs/Symptoms:Intubation.   COMPARISON: None.   ACCESSION NUMBER(S): [id number]   ORDERING CLINICIAN: NUHA FERRERA   FINDINGS:   CARDIOMEDIASTINAL SILHOUETTE: Cardiomediastinal silhouette is normal in size and configuration.   LUNGS/PLEURA: There are no consolidations.There are no pleural effusions. There is no demonstrated pneumothorax.   LINES/TUBES: Interval placement of endotracheal tube terminating 2.5 cm above the aleksandr. Enteric tube courses below the diaphragm and coiled in gastric fundus with its tip pointing toward the gastroesophageal junction.   BONES: No evidence of acute osseous abnormality. There is a chronic left proximal humerus fracture as also visualized on prior radiographs.       1.  No evidence of acute cardiopulmonary process. Lines and tubes as described above.     Signed by: Baltazar Chapa 2/19/2024 2:36 AM Dictation workstation:   [id number]    XR femur right 2+ views    Result Date: 2/18/2024  Interpreted By:  Kris Deleon, STUDY: XR FEMUR RIGHT 2+ VIEWS;  2/18/2024 12:32 pm   INDICATION: Signs/Symptoms:AP and lateral. Ruling out adjacent injury prior to surgery. Low pre-test probability. Thank you..   COMPARISON: None.   ACCESSION NUMBER(S): [id number]   ORDERING CLINICIAN: BRENT MARTÍNEZ   FINDINGS: Bony structures:  Comminuted impacted basicervical and intertrochanteric fracture with varus angulation. The remaining bony structures are intact.   Joint spaces:  The hip joint space is maintained. There is mild tricompartment arthrosis at the knee, particularly at the medial joint compartment with mild osteophytosis. No definite knee joint effusion.   Soft tissues:  Fairly extensive atherosclerotic calcification of the superficial femoral artery and popliteal artery.   Other:  None significant       Hip fracture.   MACRO: None   Signed by: Kris Deleon 2/18/2024 12:39 PM Dictation workstation:   [id number]    CT head wo IV contrast    Result Date: 2/17/2024  Interpreted By:   Baltazar Chapa, STUDY: CT HEAD WO IV CONTRAST; CT CERVICAL SPINE WO IV CONTRAST;  2/17/2024 9:32 pm   INDICATION: Signs/Symptoms:fall. Altered mental status   COMPARISON: No prior cross-sectional imaging is available for comparison.   ACCESSION NUMBER(S): [id number]; [id number]   ORDERING CLINICIAN: OLIVER RICHARDS   TECHNIQUE: Axial noncontrast CT images of the head and cervical spine.   FINDINGS: BRAIN PARENCHYMA: Gray-white matter interfaces are preserved. No mass, mass effect or midline shift. There are mild periventricular white matter hypodensities suggestive of mild chronic microvascular ischemic change.   HEMORRHAGE: No acute intracranial hemorrhage. VENTRICLES and EXTRA-AXIAL SPACES: Normal size for age. EXTRACRANIAL SOFT TISSUES: Unremarkable. CALVARIUM: No depressed calvarial fracture.   SOFT TISSUES: Within normal limits. PARANASAL SINUSES: No hemorrhage in the paranasal sinuses. There is moderate polypoid mucosal thickening of the left maxillary sinus. MASTOIDS: Within normal limits. ORBITS: Grossly normal.   ALIGNMENT: Normal cervical lordosis. VERTEBRAE: No acute fracture. Cervical vertebral body heights are maintained. There are mild age-indeterminate compression deformities of the superior endplates of T1 and T2 vertebral bodies with associated superior endplate Schmorl nodes. There are no suspicious osseous lesions. Mild osteopenia. Mild degenerative disc space narrowing and endplate reactive change at C4-C5. SPINAL CANAL: No significant spinal canal stenosis. PREVERTEBRAL SOFT TISSUES: No prevertebral soft tissue swelling. LUNG APICES: Imaged portion of the lung apices are within normal limits.   OTHER FINDINGS: None.         No acute intracranial abnormality.   No evidence of cervical spine fracture or traumatic malalignment. Mild age-indeterminate compression deformities of the superior endplates of T1 and T2. Correlation with point tenderness recommended.   Signed by: Baltazar Chapa  2/17/2024 9:39 PM Dictation workstation:   [id number]    CT cervical spine wo IV contrast    Result Date: 2/17/2024  Interpreted By:  Baltazar Chapa, STUDY: CT HEAD WO IV CONTRAST; CT CERVICAL SPINE WO IV CONTRAST;  2/17/2024 9:32 pm   INDICATION: Signs/Symptoms:fall. Altered mental status   COMPARISON: No prior cross-sectional imaging is available for comparison.   ACCESSION NUMBER(S): [id number]; [id number]   ORDERING CLINICIAN: OLIVER RICHARDS   TECHNIQUE: Axial noncontrast CT images of the head and cervical spine.   FINDINGS: BRAIN PARENCHYMA: Gray-white matter interfaces are preserved. No mass, mass effect or midline shift. There are mild periventricular white matter hypodensities suggestive of mild chronic microvascular ischemic change.   HEMORRHAGE: No acute intracranial hemorrhage. VENTRICLES and EXTRA-AXIAL SPACES: Normal size for age. EXTRACRANIAL SOFT TISSUES: Unremarkable. CALVARIUM: No depressed calvarial fracture.   SOFT TISSUES: Within normal limits. PARANASAL SINUSES: No hemorrhage in the paranasal sinuses. There is moderate polypoid mucosal thickening of the left maxillary sinus. MASTOIDS: Within normal limits. ORBITS: Grossly normal.   ALIGNMENT: Normal cervical lordosis. VERTEBRAE: No acute fracture. Cervical vertebral body heights are maintained. There are mild age-indeterminate compression deformities of the superior endplates of T1 and T2 vertebral bodies with associated superior endplate Schmorl nodes. There are no suspicious osseous lesions. Mild osteopenia. Mild degenerative disc space narrowing and endplate reactive change at C4-C5. SPINAL CANAL: No significant spinal canal stenosis. PREVERTEBRAL SOFT TISSUES: No prevertebral soft tissue swelling. LUNG APICES: Imaged portion of the lung apices are within normal limits.   OTHER FINDINGS: None.         No acute intracranial abnormality.   No evidence of cervical spine fracture or traumatic malalignment. Mild age-indeterminate  compression deformities of the superior endplates of T1 and T2. Correlation with point tenderness recommended.   Signed by: Baltazar Chapa 2/17/2024 9:39 PM Dictation workstation:   [id number]    XR knee right 1-2 views    Result Date: 2/17/2024  Interpreted By:  Orion Lucas, STUDY: XR KNEE RIGHT 1-2 VIEWS;  2/17/2024 9:24 pm   INDICATION: Signs/Symptoms:fall.   COMPARISON: None   ACCESSION NUMBER(S): [id number]   ORDERING CLINICIAN: OLIVER RICHARDS   FINDINGS: Two views right knee   No acute fracture or dislocation. Moderate-to-severe tricompartmental degenerative changes most pronounced in the medial and patellofemoral compartments with joint space narrowing and marginal osteophytosis. Small joint effusion. Severe vascular calcifications.       1. No acute osseous abnormality identified.       Signed by: Orion Lucas 2/17/2024 9:31 PM Dictation workstation:   [id number]    XR hip right with pelvis when performed 2 or 3 views    Result Date: 2/17/2024  Interpreted By:  Orion Lucas, STUDY: XR HIP RIGHT WITH PELVIS WHEN PERFORMED 2 OR 3 VIEWS;  2/17/2024 9:24 pm   INDICATION: Signs/Symptoms:pain.   COMPARISON: None.   ACCESSION NUMBER(S): [id number]   ORDERING CLINICIAN: OLIVER RICHARDS   FINDINGS: AP view of the pelvis with AP and lateral views of the right hip   Basicervical/intertrochanteric fracture of the right hip with an additional fracture Prolene extending through the base of the lesser trochanter. Mild angulation of the intertrochanteric fracture site and mild displacement of the lesser trochanter. No dislocation. Heavy vascular calcifications in the pelvis and upper thighs.         1. Comminuted right basicervical/intertrochanteric femoral fracture.       Signed by: Orion Lucas 2/17/2024 9:30 PM Dictation workstation:   [id number]                       Opal Coma Scale  Best Eye Response: Spontaneous  Best Verbal Response: Oriented  Best Motor Response: Follows commands  Opal  Coma Scale Score: 15              Assessment/Plan   This patient currently has cardiac telemetry ordered; if you would like to modify or discontinue the telemetry order, click here to go to the orders activity to modify/discontinue the order.  Principal Problem:    Fall, initial encounter  Active Problems:    Fracture of right hip, closed, initial encounter (CMS/[facility])    IMPRESSION:  83 year old female with history of DM, HTN, and DLD presented after having mechanical fall on 2/17 that resulted in R femoral fracture. She had ORIF done 2/18 with reported confusion/hallucinations in the post op period requiring dose of olanzepine IM. At 0115 on 2/19/24, ? Seizure activity followed by full arrest (seizure per nursing documentation, not in physician code blue documentation).   HCT without acute findings, repeat HCT unchanged.   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   On personal review of imaging and comparison to prior HCTs this admission, there seemingly is possible calcified area to R cerebellar region on both studies correlating to this area of possible recent hemorrhage on the MRI. Cannot definitively state when this hemorrhage occurred.   Review of the MRI T2* images also shows areas of blood product within the sulci of the B frontal regions and R occipital area. Likely consistent with prior subarachnoid hemorrhages (not on DWI so at least not acute but age unknown).  Repeat HCT 2/22/24 did not show any worsened bleeding.      IMS Dr. Hubbard discussed with neurosurgery Dr. Palomo at [facility] [place]  about MRI findings who states no surgical intervention needed but can start 81 mg ASA for secondary stroke prevention with acute punctate stroke found.      RECOMMENDATIONS:  Continue Keppra 500 mg BID, tolerating well.   Continue seizure/delirium precautions  Continue supportive care.   Continue ASA 81 mg.   HI statin when able, holding due to elevated LFTs  Continue PT/OT as able     Discussed with patient and family.   All questions answered.   Follow up with myself in 4 weeks outpatient.     Will sign off from neurology standpoint.          I spent 30 minutes in the professional and overall care of this patient including examination, patient/family discussion, result and image review, and treatment planning.     Mariza Rojo, APRN-CNP

## 2024-02-23 NOTE — PROGRESS NOTES
"  Kathy Pappas is a 83 y.o. female on day 5 of admission presenting with Fall, initial encounter.    Subjective   Kathy Pappas is an 83 y.o. female with history of diabetes, hypertension, and hyperlipidemia presenting with a fall and right hip pain.  She states that she was coming downstairs tonight when she slipped on the penultimate step and fell on her right side. She bruised both knees and hit forehead on the floor.  She was brought to the ED by EMS and hip imaging study yielded a comminuted right basicervical/intertrochanteric femoral fracture. Pt denies having any antecedent or associated lightheadedness, chest pain, palpitations or SOB. There was no LOC. No HA, visual changes, nausea or vomiting. She reports being fairly active and is able to do >4METS. She denies history of structural heart disease including MI.     2/19: After surgery patient became unresponsive and arrested. ROSC was achieved after 4 rounds of CPR/Epi. Seizure active was noted as well. By the time I saw there this AM she was extubated and agitated.     2/20: She's not yet back to her mentation baseline but she is improved from yesterday.     2/21: Mentation continues to slowly improve.  2/22: Patient was evaluated this morning, awake and alert, no apparent focal neurological deficit.         Objective   Patient is awake and orient, not in apparent distress  Eyes: PERRLA, no conjunctival congestion  Chest: Bilateral Air entry, no crackles or wheezing  Heart: s1S2 regular, no murmur  Abdomen: Soft, non tender, BS present  Ext:      Last Recorded Vitals  Blood pressure 124/73, pulse 98, temperature 36.9 °C (98.5 °F), temperature source Temporal, resp. rate 19, height 1.6 m (5' 2.99\"), weight 61.2 kg (134 lb 14.7 oz), SpO2 96 %.  Intake/Output last 3 Shifts:  I/O last 3 completed shifts:  In: 220 (3.6 mL/kg) [P.O.:120; IV Piggyback:100]  Out: 2800 (45.8 mL/kg) [Urine:2800 (1.3 mL/kg/hr)]  Weight: 61.2 kg     Relevant " Results           This patient currently has cardiac telemetry ordered; if you would like to modify or discontinue the telemetry order, click here to go to the orders activity to modify/discontinue the order.                 Assessment/Plan     Cardiac Arrest   -ROSC achieved after 4 rounds of CPR on 2/18  -she is now extubated and awake, delirium improving/resolved  -PT/OT on board  -Discharge planning to ECF for rehabilitation    Seizures   -before cardiac arrest patient was noted to have ?seizure activity   -CTH without acute process   -EEG: No epileptiform discharges or lateralizing signs.   -Keppra started by neuro  -MRI findings as below with possible seizure foci.  -Neurology consulted-recommends continuing Keppra 500 mg twice daily    Cerebellar hemorrhage   -as noted on 2/21 MRI brain   -I spoke with Dr. Palomo from [place] at [facility] [place] and he stated there is no intervention necessary and that patient is OK to start baby ASA now for secondary stroke ppx.  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 Palomo from [place] at [facility] [place]  -awaiting normalization of LFTs before starting HI statin     Acute Hypoxemic Respiratory Failure   -during cardiac arrest   -Patient is now extubated and on NC    Fall with right hip fracture  -S/P ORIF on 2/18  -stopped Lovenox with cerebellar hemorrhage. We will need to use ASA for stroke ppx.   -WBAT  -Ortho consulting     Hypomagnesemia  -replete PRN     Type II DM  -SSI IP      HTN  -home meds     THERESA on CKD stage 3  Monitor renal function  Baseline serum creatinine is 1.7,   Renal was gradually improving and almost back to baseline  Discharge planning to ECF for rehabilitation  Will continue holding hydrochlorothiazide and valsartan  Nephrology follow-up as an outpatient    Annel Fleming MD

## 2024-02-23 NOTE — PROGRESS NOTES
Patient will need auth to admit to [facility]. Per [facility] bed is available on Sunday, she will alert us if bed comes open sooner. Will start auth for patient's weekend admission today.  Precert Team to start.     Auth Approved for [facility]. Daughter and patient aware. Plan to discharge tomorrow. Ambulance form on chart. IMM completed and signed .

## 2024-02-23 NOTE — PROGRESS NOTES
"      Nephrology Progress Note      Nephrology following for THERESA.        Indwelling Stevenson just removed.  She has external Stevenson now  Sleepy today but no other complaints  Patient mentating okay        /73 (BP Location: Right arm, Patient Position: Lying)   Pulse 97   Temp 36.9 °C (98.5 °F) (Temporal)   Resp 19   Ht 1.6 m (5' 2.99\")   Wt 61.2 kg (134 lb 14.7 oz)   SpO2 96%   BMI 23.91 kg/m²     Input / Output:  24 HR:   Intake/Output Summary (Last 24 hours) at 2/23/2024 1222  Last data filed at 2/23/2024 0500  Gross per 24 hour   Intake 100 ml   Output 450 ml   Net -350 ml         Physical Exam   Alert and oriented x 3 NAD  Neck: no JVD  CV: RRR  Lungs: CTA bilaterally  Abd: soft, NT, ND   Ext: no lower extremity edema    Scheduled medications  aspirin, 81 mg, oral, Daily  docusate sodium, 100 mg, oral, BID  insulin lispro, 0-10 Units, subcutaneous, TID with meals  iron sucrose, 200 mg, intravenous, Daily  levETIRAcetam, 500 mg, oral, BID  metoprolol succinate XL, 25 mg, oral, Daily  polyethylene glycol, 17 g, oral, Daily  [Held by provider] simvastatin, 40 mg, oral, Nightly  [Held by provider] valsartan 320 mg, hydroCHLOROthiazide 25 mg for Diovan HCT, , oral, Daily      Continuous medications     PRN medications  PRN medications: acetaminophen **OR** acetaminophen **OR** acetaminophen, dextromethorphan-guaifenesin, dextrose 10 % in water (D10W), dextrose, glucagon, guaiFENesin, HYDROmorphone, ondansetron **OR** ondansetron, oxyCODONE, oxygen   Results from last 7 days   Lab Units 02/23/24  0419   SODIUM mmol/L 140   POTASSIUM mmol/L 4.8   CHLORIDE mmol/L 107   CO2 mmol/L 27   BUN mg/dL 41*   CREATININE mg/dL 1.97*   CALCIUM mg/dL 8.2*   PROTEIN TOTAL g/dL 5.2*   BILIRUBIN TOTAL mg/dL 0.7   ALK PHOS U/L 80   ALT U/L 7   AST U/L 37   GLUCOSE mg/dL 126*        Results from last 7 days   Lab Units 02/23/24  0419 02/22/24  0509 02/21/24  0352   MAGNESIUM mg/dL 1.70 1.84 2.01        Results from last 7 days "   Lab Units 02/23/24  0419 02/22/24  0509 02/21/24  0352   WBC AUTO x10*3/uL 7.1 7.7 8.5   HEMOGLOBIN g/dL 7.7* 7.3* 7.4*   HEMATOCRIT % 23.7* 23.2* 23.5*   PLATELETS AUTO x10*3/uL 210 194 178          Assessment & Plan:   Patient with hx including DM, HLD, HTN who presented with fall and R hip pain, found to have R basciervical/intertrochanteric femoral fracture. Underwent Hp fracture fixation on 2/18, but hospitalization complicated by cardiac arrest, noted ROSC achieved after 4 round of CPR. Nephrology following for THERESA/CKD.     THERESA/CKD  -baseline Scr ~1.7mg/dl  -recent Scr improved to 1. 97mg/dl        HTN  -BP controlled recently     Anemia  -recent Hgb low at 7.7  -no role for YUDITH in setting of THERESA  -transfuse PRN per primary  -S/P IV iron    Hypokalemia- corrected     Plan:  -monitor renal function, electrolytes  -holding valsartan, HCTZ  -no need for RRT  -No need for IVF's as long a PO intake remains good       Please message me through EPIC chat with any questions or concerns.     Mateusz Seaman PA-C  2/23/2024  12:22 PM     [facility]    [address], [address]   [place] [address]  Office: [phone]

## 2024-02-23 NOTE — PROGRESS NOTES
S: Doing well, working with therapy in her chair this morning as I entered. Smiling and excited to be improving.          O  VS:  Temp:  [36.2 °C (97.2 °F)-36.9 °C (98.4 °F)] 36.2 °C (97.2 °F)  Heart Rate:  [] 87  Resp:  [18-20] 20  BP: (124-153)/(73-81) 150/78    Gen: NAD  Focused exam of operative extremity:  -Proximal dressing minor stains, distal dressing was changed 2/20/2024 and only has 1 minor spot that is unchanged  -SILT in S/S/SP/DP/T/F distributions  -Able to flex Q, TA, GS  -Palpable DP pulse, symmetric to contralateral  -SCDs in place    Drain  Na  Micro  Na  PT/OT  Okay to discharge when medically cleared  Labs (pertinent)  Hgb 7.7, slightly improved from yesterday  Imaging  No new imaging today          A&P: Kathy Pappas is a 83 y.o. female s/p open reduction internal fixation of right proximal femur fracture on 2/18/2024.  Appreciate care from internal medicine primary team  Appreciate neurology consult. Signed off 2/22/24.  Unclear if truly had seizure prior to arrest  CT head (both initial 2/17/24 and repeat 2/19/24): No acute abnormalities. Repeat CT head 2/22/24 no worsened bleeding.  EEG 2/19/24: Neg for seizures  MRI brain 2/21/24: Time indeterminant possible hemorrhage in R cerebellar region. Discussed with NSGY, who recommended no surgical intervention but starting 81mg asa for secondary stroke prevention with acute punctate stroke found.  With hemosiderin and hemorrhage findings, will start on antiepileptic medication  Abx prophylaxis: 24hr periop completed  Analgesia: Multimodal with breakthrough   Consults: Appreciate therapy, ASHLEY, case mgmt  Dressing: Aquacel/Mepilex for 7d. Patient will remove at home then apply daily dry dressing PRN  DVT ppx: Early mobilization, SCDs, pharmacologic (prefer LVX 6wks)  BLE swelling: Compression hose  Activity: WBAT  Diet: Routine. DC IVF when adequate PO intake  DC: Planning for DC to care facility, planning in process. F/u with me 2wks  after DC.      2/24/2024 addendum:: Unfortunately, early this morning, Kathy developed bradycardia and went into asystole.  She was resuscitated, and a CT scan of her head showed extensive intracranial hemorrhage and subarachnoid hemorrhage with developing tonsillar herniation and brainstem compression.  Her family decided to withdraw care and terminally extubate her.  I spoke with her daughter, Rika, on the phone today and offered my condolences.

## 2024-02-23 NOTE — PROGRESS NOTES
Occupational Therapy    OT Treatment    Patient Name: Kathy Pappas  MRN: [id number]  Today's Date: 2/23/2024  Time Calculation  Start Time: 1540  Stop Time: 1607  Time Calculation (min): 27 min         Assessment:  OT Assessment: Pt continues to require mod A to stand however once upright progressed to min A to safely complete functional transfers. Pt progressed actvity tolerance to completing functional mobility < min household distance.     Plan:  Treatment Interventions: ADL retraining, Functional transfer training, UE strengthening/ROM, Endurance training, Cognitive reorientation, Patient/family training, Equipment evaluation/education, Compensatory technique education  OT Frequency: 4 times per week  Equipment Recommended upon Discharge:  (TBD)  OT Recommended Transfer Status: Moderate assist, Assist of 2  OT - OK to Discharge: Yes (okay to dc to next level of care once medically cleared)  Treatment Interventions: ADL retraining, Functional transfer training, UE strengthening/ROM, Endurance training, Cognitive reorientation, Patient/family training, Equipment evaluation/education, Compensatory technique education    Subjective   Previous Visit Info:  OT Last Visit  OT Received On: 02/23/24  General:  General  Prior to Session Communication: Bedside nurse  Patient Position Received: Bed, 3 rail up, Alarm on  General Comment: Pt agreeable and cooperative to therapy.       Pain:  Pain Assessment  Pain Assessment: 0-10  Pain Score: 0 - No pain    Objective    Cognition:  Cognition  Overall Cognitive Status: Within Functional Limits  Orientation Level: Oriented X4    Functional Standing Tolerance:  Activity: Pt tolerated static standing for 1 minute x2 trials with min A using FWW for support.  Bed Mobility/Transfers: Bed Mobility  Bed Mobility: Yes  Bed Mobility 1  Bed Mobility 1: Supine to sitting  Level of Assistance 1: Moderate assistance, Moderate verbal cues    Transfers  Transfer: Yes  Transfer  1  Technique 1: Sit to stand, Stand to sit  Transfer Device 1: Walker  Transfer Level of Assistance 1: Moderate assistance, Minimal verbal cues  Transfers 2  Transfer From 2: Bed to  Transfer to 2: Chair with arms  Technique 2: Sit to stand, Stand to sit  Transfer Device 2: Walker  Transfer Level of Assistance 2: Minimum assistance, Minimal verbal cues      Therapy/Activity:      Therapeutic Activity  Therapeutic Activity Performed: Yes        Outcome Measures:[facility] Daily Activity  Putting on and taking off regular lower body clothing: A lot  Bathing (including washing, rinsing, drying): A lot  Putting on and taking off regular upper body clothing: A lot  Toileting, which includes using toilet, bedpan or urinal: A lot  Taking care of personal grooming such as brushing teeth: A little  Eating Meals: A little  Daily Activity - Total Score: 14        Education Documentation  Body Mechanics, taught by NEIDA Kendrick at 2/23/2024  4:17 PM.  Learner: Patient  Readiness: Acceptance  Method: Explanation  Response: Needs Reinforcement    Education Comments  No comments found.        OP EDUCATION:       Goals:  Encounter Problems       Encounter Problems (Active)       ADLs       Patient will perform UB and LB bathing  with minimal assist  level of assistance. (Progressing)       Start:  02/20/24    Expected End:  03/05/24            Patient with complete upper body dressing with contact guard assist level of assistance donning and doffing all UE clothes with no adaptive equipment while edge of bed  (Progressing)       Start:  02/20/24    Expected End:  03/05/24                 TRANSFERS       Patient will complete sit to stand transfer with minimal assist  level of assistance and front wheeled walker in order to improve safety and prepare for out of bed mobility. (Progressing)       Start:  02/20/24    Expected End:  03/05/24            OT GOAL 3 (Progressing)       Start:  02/20/24    Expected End:  03/05/24

## 2024-02-23 NOTE — PROGRESS NOTES
Physical Therapy    Physical Therapy Treatment    Patient Name: Kathy Pappas  MRN: [id number]  Today's Date: 2/23/2024  Time Calculation  Start Time: 1015  Stop Time: 1030  Time Calculation (min): 15 min       Assessment/Plan   PT Assessment  End of Session Communication: Bedside nurse  Assessment Comment: pt is demonstrating good progress  toward al goals set by PT. pt is motivated and compliant this session.  End of Session Patient Position: Alarm on, Up in chair  PT Plan  Inpatient/Swing Bed or Outpatient: Inpatient  PT Plan  Treatment/Interventions: Bed mobility, Transfer training, Gait training, Strengthening, Endurance training  PT Plan: Skilled PT  PT Frequency: 5 times per week (1-2X/DAY)  PT Discharge Recommendations: Moderate intensity level of continued care  Equipment Recommended upon Discharge:  (TBD)  PT Recommended Transfer Status: Assist x2 (FWW)  PT - OK to Discharge: Yes (WHEN MEDICALLY CLEARED)      General Visit Information:   PT  Visit  PT Received On: 02/23/24  Response to Previous Treatment: Patient with no complaints from previous session.  General  Patient Position Received: Bed, 3 rail up, Alarm on  Preferred Learning Style: verbal  General Comment: pt agreeable to PT and cleared by RN.      Precautions:  Precautions  Medical Precautions: Fall precautions, Neuro precautions         Pain:  Pain Assessment  Pain Assessment: 0-10  Pain Score: 0 - No pain  Cognition:  Cognition  Orientation Level: Oriented X4       Treatments:  Bed Mobility  Bed Mobility: Yes  Bed Mobility 1  Bed Mobility 1: Supine to sitting  Level of Assistance 1: Moderate assistance, Moderate verbal cues  Bed Mobility Comments 1: pt with MAX cues for safety and technique.    Ambulation/Gait Training  Ambulation/Gait Training Performed: Yes  Ambulation/Gait Training 1  Surface 1: Level tile  Device 1: Rolling walker  Assistance 1: Moderate assistance, Minimal verbal cues  Quality of Gait 1: Narrow base of support,  Diminished heel strike, Inconsistent stride length, Decreased step length  Comments/Distance (ft) 1: 25'x1 and 14'x1 with cues for walker safety and improving gait pattern,  Transfers  Transfer: Yes  Transfer 1  Technique 1: Sit to stand, Stand to sit  Transfer Device 1: Walker  Transfer Level of Assistance 1: Moderate assistance, Minimal verbal cues  Trials/Comments 1: cues for proper hand placement and safety  Transfers 2  Technique 2: Stand pivot  Transfer Device 2: Walker  Transfer Level of Assistance 2: Moderate assistance  Trials/Comments 2: pt required cues for walker technique and placement. pt attempting to sit prior to completing the pivot.    Outcome Measures:  [facility] Basic Mobility  Turning from your back to your side while in a flat bed without using bedrails: A lot  Moving from lying on your back to sitting on the side of a flat bed without using bedrails: A lot  Moving to and from bed to chair (including a wheelchair): A lot  Standing up from a chair using your arms (e.g. wheelchair or bedside chair): A lot  To walk in hospital room: A lot  Climbing 3-5 steps with railing: Total  Basic Mobility - Total Score: 11    Education Documentation  Mobility Training, taught by Jennifer Watkins PTA at 2/23/2024 10:35 AM.  Learner: Patient  Readiness: Acceptance  Method: Explanation  Response: Verbalizes Understanding, Needs Reinforcement    Mobility Training, taught by Jennifer Watkins PTA at 2/23/2024  9:02 AM.  Learner: Patient  Readiness: Acceptance  Method: Explanation  Response: Verbalizes Understanding, Needs Reinforcement    Education Comments  No comments found.               Encounter Problems       Encounter Problems (Active)       Mobility       STG - Patient will ambulate (Progressing)       Start:  02/20/24    Expected End:  03/01/24       FWW WBAT RLE, MIN X1 50-75 FT         STRENGTHENING (Progressing)       Start:  02/20/24    Expected End:  03/12/24       20+ REPS AROM RLE AND RROM ON LLE INCREASING  STRENGTH TO STABILIZE GAIT            Transfers       STG - Patient to transfer to and from sit to supine (Progressing)       Start:  02/20/24    Expected End:  02/29/24       MIN A USING RAILS         STG - Patient will transfer sit to and from stand (Progressing)       Start:  02/20/24    Expected End:  02/28/24       RLE WBAT FWW, MIN A USING PROPER TECHNIQUE

## 2024-02-23 NOTE — PROGRESS NOTES
Physical Therapy    Physical Therapy Treatment    Patient Name: Kathy Pappas  MRN: [id number]  Today's Date: 2/23/2024  Time Calculation  Start Time: 0659  Stop Time: 0724  Time Calculation (min): 25 min       Assessment/Plan   PT Assessment  End of Session Communication: Bedside nurse  Assessment Comment: pt is demonstrating good progress  toward al goals set by PT. pt is motivated and compliant this session. (Kathy will benefit from further skilled PT services.)  End of Session Patient Position: Alarm on, Up in chair  PT Plan  Inpatient/Swing Bed or Outpatient: Inpatient  PT Plan  Treatment/Interventions: Bed mobility, Transfer training, Gait training, Strengthening, Endurance training  PT Plan: Skilled PT  PT Frequency: 5 times per week (1-2X/DAY)  PT Discharge Recommendations: Moderate intensity level of continued care  Equipment Recommended upon Discharge:  (TBD)  PT Recommended Transfer Status: Assist x2 (FWW)  PT - OK to Discharge: Yes (WHEN MEDICALLY CLEARED)      General Visit Information:   PT  Visit  PT Received On: 02/23/24  Response to Previous Treatment: Patient with no complaints from previous session.  General  Patient Position Received: Bed, 3 rail up, Alarm on  Preferred Learning Style: verbal  General Comment: pt agreeable to PT and cleared by Rn. Ortho  assessed pt while PTA in room.      Precautions:  Precautions  Medical Precautions: Fall precautions, Neuro precautions    Pain:  Pain Assessment  Pain Assessment: 0-10  Pain Score: 0 - No pain  Cognition:  Cognition  Orientation Level: Oriented X4       Treatments:  Therapeutic Exercise  Therapeutic Exercise Performed: Yes (required cues for technique.)  Therapeutic Exercise Activity 1: ankle pumps x15  Therapeutic Exercise Activity 2: glut sets x15  Therapeutic Exercise Activity 3: hip ab/adduction x15  Therapeutic Exercise Activity 4: marches x15  Therapeutic Exercise Activity 5: LAQ x15    Bed Mobility  Bed Mobility: Yes  Bed Mobility  1  Bed Mobility 1: Supine to sitting  Level of Assistance 1: Moderate assistance, Moderate verbal cues  Bed Mobility Comments 1: pt required cues for improved technique and  safety  to make transition to sit easier.    Ambulation/Gait Training  Ambulation/Gait Training Performed: Yes  Ambulation/Gait Training 1  Surface 1: Level tile  Device 1: Rolling walker  Assistance 1: Moderate assistance, Minimal verbal cues  Quality of Gait 1: Narrow base of support, Diminished heel strike, Inconsistent stride length, Decreased step length  Comments/Distance (ft) 1: 5 feet with cues for improved gait pattern and safety.  Transfers  Transfer: Yes  Transfer 1  Technique 1: Sit to stand, Stand to sit  Transfer Device 1: Walker  Transfer Level of Assistance 1: Moderate assistance, Minimal verbal cues  Trials/Comments 1: cues for hand placement and safety.  Transfers 2  Technique 2: Stand pivot  Transfer Device 2: Walker  Transfer Level of Assistance 2: Moderate assistance  Trials/Comments 2: pt required cues for walker technique and placement. pt attempting to sit prior to completing the pivot.    Outcome Measures:  [facility] Basic Mobility  Turning from your back to your side while in a flat bed without using bedrails: A lot  Moving from lying on your back to sitting on the side of a flat bed without using bedrails: A lot  Moving to and from bed to chair (including a wheelchair): A lot  Standing up from a chair using your arms (e.g. wheelchair or bedside chair): A lot  To walk in hospital room: A lot  Climbing 3-5 steps with railing: Total  Basic Mobility - Total Score: 11    Education Documentation  Mobility Training, taught by Jennifer Watkins PTA at 2/23/2024  9:02 AM.  Learner: Patient  Readiness: Acceptance  Method: Explanation  Response: Verbalizes Understanding, Needs Reinforcement    Education Comments  No comments found.               Encounter Problems       Encounter Problems (Active)       Mobility       STG - Patient will  ambulate (Progressing)       Start:  02/20/24    Expected End:  03/01/24       FWW WBAT RLE, MIN X1 50-75 FT         STRENGTHENING (Progressing)       Start:  02/20/24    Expected End:  03/12/24       20+ REPS AROM RLE AND RROM ON LLE INCREASING STRENGTH TO STABILIZE GAIT            Transfers       STG - Patient to transfer to and from sit to supine (Progressing)       Start:  02/20/24    Expected End:  02/29/24       MIN A USING RAILS         STG - Patient will transfer sit to and from stand (Progressing)       Start:  02/20/24    Expected End:  02/28/24       RLE WBAT FWW, MIN A USING PROPER TECHNIQUE

## 2024-02-24 PROBLEM — S72.001A: Status: RESOLVED | Noted: 2024-01-01 | Resolved: 2024-01-01

## 2024-02-24 NOTE — NURSING NOTE
[employer] called to inform of patient passing, told patient is not a candidate for donations, okay to release body. I was given a referral # [id number] spoke with haleigh Hanna.

## 2024-02-24 NOTE — PROGRESS NOTES
Physical Therapy                 Therapy Communication Note    Patient Name: Kathy Pappas  MRN: [id number]  Today's Date: 2024     Discipline: Physical Therapy    Missed Visit Reason: Missed Visit Reason: Other (Comment) (Large family and Doctor presence. Spoke with RN who reports that pt. recently made CCO, and extubated. Pt. later  at 1007.)    Missed Time: Attempt    Comment:

## 2024-02-24 NOTE — NURSING NOTE
Patient asystole on tele, Dr Fleming at bedside pronouncing time of death. Patient's family (including daughter Rika Antunez) at bedside, informed of confirm time of death of 10:07 am.

## 2024-02-24 NOTE — DISCHARGE SUMMARY
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  Mechanical fall with right hip fracture  Cardiac arrest  Seizure      This discharge took greater than 35 minutes.    Test Results Pending At Discharge  Pending Labs       No current pending labs.            Hospital Course   Patient presented to hospital after a fall and found to have right hip fracture-underwent ORIF on , patient developed cardiac arrest after surgery, status post successful resuscitation with ROSC-she was thought to have seizure and started on Keppra with neurology recommendation.  Patient was improving and doing better and was plan to discharge her to rehab but on 2024 early morning patient developed bradycardia and went into asystole.  ROSC with resuscitation, CT head showed extensive ICH/SAH with developing tonsillar herniation and brainstem compression.  At this time family wished for withdrawal of care and terminal extubation.  [employer] [facility] was contacted and recommended patient is to have formal branded before they can evaluate for possible organ harvest.  Patient is still have some gag reflex but the family did not want to wait and requested terminal extubation as they does not want their beloved one to suffer.  Patient was terminally extubated and  today at 1007.  Clearing cause of death intracranial bleed.    Pertinent Physical Exam At Time of Discharge  Physical Exam  Patient unresponsive, no breath sound, no heart sound, no chest wall movement.  Home Medications     Medication List      ASK your doctor about these medications     cholecalciferol 50 MCG (2000 UT) tablet; Commonly known as: Vitamin D-3   ferrous sulfate 325 (65 Fe) MG EC tablet   ibandronate 150 mg tablet; Commonly known as: Boniva; Take 1 tablet (150   mg) by mouth every 30 (thirty) days.   lisinopril 20 mg tablet   metFORMIN 850 mg tablet; Commonly known as: Glucophage; Take 1 tablet   (850 mg) by mouth  2 times a day with meals.   metoprolol succinate XL 25 mg 24 hr tablet; Commonly known as:   Toprol-XL; Take 1 tablet (25 mg) by mouth once daily.   simvastatin 40 mg tablet; Commonly known as: Zocor   valsartan-hydrochlorothiazide 320-25 mg tablet; Commonly known as:   Diovan-HCT   Vitamin B-12 500 mcg tablet; Generic drug: cyanocobalamin       Outpatient Follow-Up  No follow-ups on file.     Annel Fleming MD  2/24/2024  10:25 AM

## 2024-02-24 NOTE — PROGRESS NOTES
Occupational Therapy                 Therapy Communication Note    Patient Name: Kathy Pappas  MRN: [id number]  Today's Date: 2/24/2024     Discipline: Occupational Therapy    Missed Visit Reason: Missed Visit Reason: Other (Comment) (RN stated pt had coded over night and family present at this time to discuss comfort care.)    Missed Time: Attempt    Comment:

## 2024-02-24 NOTE — SIGNIFICANT EVENT
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 Formal brain death determination was initially requested, but later he informed me shortly after 9am (before I could see patient) that family wanted to go ahead with terminal extubation.    Dr Miller

## 2024-02-24 NOTE — CODE DOCUMENTATION
CODE BLUE note--acute progression of intracranial bleed to not survivable condition    Patient on telemetry was noted to become bradycardic.  When nursing went into check on her was found to be unresponsive.  No blue was called.  Upon arrival patient did not have pulse and chest compressions were performed patient received single dose of epinephrine with return of spontaneous circulation.  Patient was unresponsive and not protecting airway nor breathing on her own.  And was intubated.  As patient has history of intracranial bleed earlier in this hospital admission stat CT of the head was performed.  Preliminary reading shows a large cerebellar hemorrhage with compression on brainstem.  It is felt to be nonsurvivable per radiology.  Phone discussion then with patient's daughter Rika Antunez relaying this information.  Patient's daughter is coming in to see patient and would like things to be continued on support but if patient should arrest once again no further resuscitation attempts should be made.

## 2024-02-24 NOTE — NURSING NOTE
Patient's family stated wishes to terminally extubate, and go comfort care. RT at bedside, patient extubated, placed on 3L nc

## 2024-03-04 ENCOUNTER — APPOINTMENT (OUTPATIENT)
Dept: ORTHOPEDIC SURGERY | Facility: CLINIC | Age: 84
End: 2024-03-04
Payer: MEDICARE

## 2024-03-14 NOTE — DOCUMENTATION CLARIFICATION NOTE
"    PATIENT:               MONIQUE ROMAN  ACCT #:                  [id number]  MRN:                       [id number]  :                       1940  ADMIT DATE:       2024 8:24 PM  DISCH DATE:        2024 12:30 PM  RESPONDING PROVIDER #:        [id number]          PROVIDER RESPONSE TEXT:    Age-related physical debility    CDI QUERY TEXT:    [facility]_Generic    Instruction:    Based on your assessment of the patient and the clinical information, please provide the requested documentation by clicking on the appropriate radio button and enter any additional information if prompted.    Question: Please further clarify after study decreased strength, decreased endurance, impaired balance, decreased mobility, decreased coordination, impaired judgement, decreased safety awareness, impaired hearing, and pain    When answering this query, please exercise your independent professional judgment. The fact that a question is being asked, does not imply that any particular answer is desired or expected.    The patient's clinical indicators include:  Clinical Information: 82 y/o with PMH including T2 DM, CKD, osteoarthritis, and HTN presented to ED after a fall.    Clinical Indicators: Physical Therapy Evaluation  revealed \"PT Assessment Results: Decreased strength, Decreased endurance, Impaired balance, Decreased mobility, Decreased coordination, Impaired judgement, Decreased safety awareness, Impaired hearing, Pain...Past Medical History Relevant to Rehab: Fell Injuring R hip ; DX: FX R hip, ORIF , rapid response - CPR done, to ICU; HX: DM, HTN, osteoporosis, HT, OA, anxiety, falls, CKD\"    Occupational Therapy Evaluation  revealed \"OT Assessment Results: Decreased ADL status, Decreased upper extremity strength, Decreased safe judgment during ADL, Decreased cognition, Decreased endurance, Decreased functional mobility\"    Treatment: Physical Therapy and Occupational Therapy assessment and " treatment.    Risk Factors: 82 y/o patient with CKD, HTN, DM 2, and osteoarthritis with decreased strength, decreased endurance, impaired balance, decreased mobility, decreased coordination, impaired judgement, decreased safety awareness, impaired hearing, and pain.  Options provided:  -- Age-related physical debility  -- Other reduced mobility  -- Other - I will add my own diagnosis  -- Refer to Clinical Documentation Reviewer    Query created by: Julio César Hardy Jr on 3/14/2024 1:23 PM      Electronically signed by:  LYNETTE HERNANDEZ MD 3/14/2024 4:12 PM

## 2024-05-02 ENCOUNTER — APPOINTMENT (OUTPATIENT)
Dept: PRIMARY CARE | Facility: CLINIC | Age: 84
End: 2024-05-02
Payer: MEDICARE

## 2024-10-17 NOTE — PROGRESS NOTES
"Endoscopy Scheduling Screen    Have you had any respiratory illness or flu-like symptoms in the last 10 days?  No    What is your communication preference for Instructions and/or Bowel Prep?   MyChart    What insurance is in the chart?  Other:  [facility]    Ordering/Referring Provider: Zafar Jerome PA-C in [facility] FP/IM/PEDS     (If ordering provider performs procedure, schedule with ordering provider unless otherwise instructed. )    BMI: Estimated body mass index is 34.38 kg/m  as calculated from the following:    Height as of 10/9/24: 1.6 m (5' 3\").    Weight as of 10/9/24: 88 kg (194 lb 1.6 oz).     Sedation Ordered  moderate sedation.   If patient BMI > 50 do not schedule in ASC.    If patient BMI > 45 do not schedule at [facility].    Are you taking methadone or Suboxone?  NO, No RN review required.    Have you been diagnosed and are being treated for severe PTSD or severe anxiety?  NO, No RN review required.    Are you taking any prescription medications for pain 3 or more times per week?   NO, No RN review required.    Do you have a history of malignant hyperthermia?  No    (Females) Are you currently pregnant?   No     Have you been diagnosed or told you have pulmonary hypertension?   No    Do you have an LVAD?  No    Have you been told you have moderate to severe sleep apnea?  No.    Have you been told you have COPD, asthma, or any other lung disease?  Yes     What breathing problems do you have?  Asthma     Do you use home oxygen?  No    Have your breathing problems required an ED visit or hospitalization in the last year?  No.    Do you have any heart conditions?  No     Have you ever had or are you waiting for an organ transplant?  No. Continue scheduling, no site restrictions.    Have you had a stroke or transient ischemic attack (TIA aka \"mini stroke\" in the last 6 months?   No    Have you been diagnosed with or been told you have cirrhosis of the liver?   No.    Are you currently on dialysis?   No    Do " "Kathy Pappas is a 83 y.o. female on day 2 of admission presenting with Fall, initial encounter.      Subjective   Pt seen and examined this afternoon for first time by myself, on SDU. Initially seen by Dr. Miller. Daughter, Rika, at bedside at time of evaluation. Pt sleeping but arouses to voice. C/o some fatigue, falls asleep during examination. Otherwise is seemingly alert and oriented, though does state \"UN\" instead of  for hospital but does say [place] for [facility].     Daughter states patient is \"much better\" today.        Objective     Last Recorded Vitals  Blood pressure 130/56, pulse 89, temperature 36.7 °C (98.1 °F), temperature source Temporal, resp. rate 13, height 1.6 m (5' 2.99\"), weight 62.5 kg (137 lb 11.2 oz), SpO2 93 %.    Physical Exam  Neurological:      Cranial Nerves: Dysarthria present.       Neurological Exam  Mental Status  Drowsy. Oriented only to person, place, time and situation. States \"UN\" instead of  for hospital, [place] for [facility]  Age correct, year correct  Correctly identifies daughter and President. Moderate dysarthria present. ? Related to patient dozing off during examination.    Cranial Nerves  CN II-XII grossly intact.    Motor  Normal muscle bulk throughout. No fasciculations present. Normal muscle tone. No abnormal involuntary movements.  Strength 5-/5 to BUE and LLE, RLE limited due to recent fracture and surgery.    Coordination  Right: Finger-to-nose normal.Left: Finger-to-nose normal.    Gait    Not tested, fall risk.      Relevant Results    Scheduled medications  docusate sodium, 100 mg, oral, BID  enoxaparin, 30 mg, subcutaneous, q24h  insulin lispro, 0-10 Units, subcutaneous, TID with meals  metoprolol succinate XL, 25 mg, oral, Daily  [Held by provider] simvastatin, 40 mg, oral, Nightly  valsartan 320 mg, hydroCHLOROthiazide 25 mg for Diovan HCT, , oral, Daily      Continuous medications     PRN medications  PRN medications: acetaminophen **OR** acetaminophen " "you need assistance transferring?   No    BMI: Estimated body mass index is 34.38 kg/m  as calculated from the following:    Height as of 10/9/24: 1.6 m (5' 3\").    Weight as of 10/9/24: 88 kg (194 lb 1.6 oz).     Is patients BMI > 40 and scheduling location [facility]?  No    Do you take an injectable or oral medication for weight loss or diabetes (excluding insulin)?  Yes, hold time can be up to 7 days. Please consult with you prescribing provider to discuss endoscopy recommendations. (Please schedule at least 7 days out.)    Do you take the medication Naltrexone?  No    Do you take blood thinners?  No       Prep   Are you currently on dialysis or do you have chronic kidney disease?  No    Do you have a diagnosis of diabetes?  Yes (Golytely Prep)    Do you have a diagnosis of cystic fibrosis (CF)?  No    On a regular basis do you go 3 -5 days between bowel movements?  No    BMI > 40?  No    Preferred Pharmacy:      [employer] [id number] [place] [facility] [address]  [address] [facility] [address]  Phone: [phone] Fax: [phone]      Final Scheduling Details     Procedure scheduled  Colonoscopy    Surgeon:  SUPRIYA     Date of procedure:  1/16     Pre-OP / PAC:   No - Not required for this site.    Location  [facility] - Patient preference.    Sedation   Moderate Sedation - Per order.      Patient Reminders:   You will receive a call from a Nurse to review instructions and health history.  This assessment must be completed prior to your procedure.  Failure to complete the Nurse assessment may result in the procedure being cancelled.      On the day of your procedure, please designate an adult(s) who can drive you home stay with you for the next 24 hours. The medicines used in the exam will make you sleepy. You will not be able to drive.      You cannot take public transportation, ride share services, or non-medical taxi service without a responsible caregiver.  Medical transport services are " **OR** acetaminophen, dextromethorphan-guaifenesin, dextrose 10 % in water (D10W), dextrose, glucagon, guaiFENesin, haloperidol lactate, HYDROmorphone, LORazepam, ondansetron **OR** ondansetron, oxyCODONE, oxygen    Results for orders placed or performed during the hospital encounter of 02/17/24 (from the past 24 hour(s))   POCT GLUCOSE   Result Value Ref Range    POCT Glucose 96 74 - 99 mg/dL   POCT GLUCOSE   Result Value Ref Range    POCT Glucose 108 (H) 74 - 99 mg/dL   CBC   Result Value Ref Range    WBC 8.2 4.4 - 11.3 x10*3/uL    nRBC 0.0 0.0 - 0.0 /100 WBCs    RBC 2.40 (L) 4.00 - 5.20 x10*6/uL    Hemoglobin 7.2 (L) 12.0 - 16.0 g/dL    Hematocrit 22.8 (L) 36.0 - 46.0 %    MCV 95 80 - 100 fL    MCH 30.0 26.0 - 34.0 pg    MCHC 31.6 (L) 32.0 - 36.0 g/dL    RDW 14.6 (H) 11.5 - 14.5 %    Platelets 163 150 - 450 x10*3/uL   Basic Metabolic Panel   Result Value Ref Range    Glucose 116 (H) 74 - 99 mg/dL    Sodium 138 136 - 145 mmol/L    Potassium 3.5 3.5 - 5.3 mmol/L    Chloride 105 98 - 107 mmol/L    Bicarbonate 24 21 - 32 mmol/L    Anion Gap 13 10 - 20 mmol/L    Urea Nitrogen 31 (H) 6 - 23 mg/dL    Creatinine 2.16 (H) 0.50 - 1.05 mg/dL    eGFR 22 (L) >60 mL/min/1.73m*2    Calcium 6.9 (L) 8.6 - 10.3 mg/dL   Hepatic function panel   Result Value Ref Range    Albumin 3.0 (L) 3.4 - 5.0 g/dL    Bilirubin, Total 0.6 0.0 - 1.2 mg/dL    Bilirubin, Direct 0.2 0.0 - 0.3 mg/dL    Alkaline Phosphatase 50 33 - 136 U/L    ALT 15 7 - 45 U/L     (H) 9 - 39 U/L    Total Protein 5.2 (L) 6.4 - 8.2 g/dL   POCT GLUCOSE   Result Value Ref Range    POCT Glucose 119 (H) 74 - 99 mg/dL     CT head wo IV contrast    Result Date: 2/19/2024  Interpreted By:  Julien Odonnell,  and Mela Child STUDY: CT HEAD WO IV CONTRAST;  2/19/2024 3:29 pm   INDICATION: Signs/Symptoms:Post cardiac arrest.   COMPARISON: 02/17/2024   ACCESSION NUMBER(S): [id number]   ORDERING CLINICIAN: NUHA FERRERA   TECHNIQUE: Axial CT images of the  allowed with the requirement that a responsible caregiver will receive you at your destination.  We require that drivers and caregivers are confirmed prior to your procedure.     head were obtained without intravenous contrast administration.   FINDINGS: There is moderate brain parenchymal volume loss.   There are patchy nonspecific white matter changes within the cerebral hemispheres bilaterally which while nonspecific, given the patient's age, likely represent sequelae of small-vessel ischemic change.   Additional small scattered hypodensities in are again noted within the subinsular regions, basal ganglia, and thalami bilaterally suggesting incidental mildly prominent perivascular spaces and/or small scattered lacunar infarctions.   No hyperdense acute intracranial hemorrhage is noted.   Dystrophic calcifications are again noted within the bilateral basal ganglia and cerebellar hemispheres stable when compared with the prior study.   There is no midline shift. The suprasellar/basilar cisterns are patent.   Atherosclerotic calcifications are noted along the carotid siphons.   There is lobulated mucosal thickening noted within the inferior maxillary sinuses left greater than right. Minimal mucosal thickening is noted within a few scattered ethmoid air cells.       There is moderate brain parenchymal volume loss.   There are patchy nonspecific white matter changes within the cerebral hemispheres bilaterally which while nonspecific, given the patient's age, likely represent sequelae of small-vessel ischemic change. Additional small scattered hypodensities in are again noted within the subinsular regions, basal ganglia, and thalami bilaterally suggesting incidental mildly prominent perivascular spaces and/or small scattered lacunar infarctions.   I personally reviewed the images/study and I agree with the findings as stated by Mateusz Gloria MD (Radiology Resident). This study was interpreted at [facility], [place], [place].     MACRO: None.   Signed by: Julien Odonnell 2/19/2024 5:12 PM Dictation workstation:   [id number]    ECG 12  lead    Result Date: 2/19/2024  Sinus rhythm Borderline T abnormalities, anterior leads Confirmed by Michael Bloom ([id number]) on 2/19/2024 1:49:43 PM    FL less than 1 hour    Result Date: 2/19/2024  These images are not reportable by radiology and will not be interpreted by  Radiologists.    ECG 12 lead    Result Date: 2/19/2024  Sinus rhythm Low voltage, precordial leads    XR hip right with pelvis when performed 2 or 3 views    Result Date: 2/19/2024  Interpreted By:  Armin Clayton, STUDY: XR HIP RIGHT WITH PELVIS WHEN PERFORMED 2 OR 3 VIEWS; ;  2/18/2024 9:03 pm   INDICATION: Signs/Symptoms:Postop.   COMPARISON: 02/17/2024 radiographs at 9:14 p.m.   ACCESSION NUMBER(S): [id number]   ORDERING CLINICIAN: BRENT MARTÍNEZ   FINDINGS: The right femoral intertrochanteric fracture has been stabilized using short intramedullary stiven and screw. The fracture fragments are in anatomic alignment. The tip of the screw lies within the femoral head.   The overall bony mineralization is decreased. No other fracture or dislocation of the pelvis or left hip is noted. Medial calcific sclerosis of the arteries is present as seen with diabetes or end stage renal disease.       Right intertrochanteric fracture fixation.     MACRO: None   Signed by: Armin Clayton 2/19/2024 7:37 AM Dictation workstation:   [id number]    XR chest 1 view    Result Date: 2/19/2024  Interpreted By:  Baltazar Chapa, STUDY: XR CHEST 1 VIEW;  2/19/2024 2:19 am   INDICATION: Signs/Symptoms:Intubation.   COMPARISON: None.   ACCESSION NUMBER(S): [id number]   ORDERING CLINICIAN: NUHA FERRERA   FINDINGS:   CARDIOMEDIASTINAL SILHOUETTE: Cardiomediastinal silhouette is normal in size and configuration.   LUNGS/PLEURA: There are no consolidations.There are no pleural effusions. There is no demonstrated pneumothorax.   LINES/TUBES: Interval placement of endotracheal tube terminating 2.5 cm above the aleksandr. Enteric tube courses below the diaphragm and coiled in gastric  fundus with its tip pointing toward the gastroesophageal junction.   BONES: No evidence of acute osseous abnormality. There is a chronic left proximal humerus fracture as also visualized on prior radiographs.       1.  No evidence of acute cardiopulmonary process. Lines and tubes as described above.     Signed by: Baltazar Chapa 2/19/2024 2:36 AM Dictation workstation:   [id number]    XR femur right 2+ views    Result Date: 2/18/2024  Interpreted By:  Kris Deleon, STUDY: XR FEMUR RIGHT 2+ VIEWS;  2/18/2024 12:32 pm   INDICATION: Signs/Symptoms:AP and lateral. Ruling out adjacent injury prior to surgery. Low pre-test probability. Thank you..   COMPARISON: None.   ACCESSION NUMBER(S): [id number]   ORDERING CLINICIAN: BRENT MARTÍNEZ   FINDINGS: Bony structures:  Comminuted impacted basicervical and intertrochanteric fracture with varus angulation. The remaining bony structures are intact.   Joint spaces:  The hip joint space is maintained. There is mild tricompartment arthrosis at the knee, particularly at the medial joint compartment with mild osteophytosis. No definite knee joint effusion.   Soft tissues:  Fairly extensive atherosclerotic calcification of the superficial femoral artery and popliteal artery.   Other:  None significant       Hip fracture.   MACRO: None   Signed by: Kris Deleon 2/18/2024 12:39 PM Dictation workstation:   [id number]    CT head wo IV contrast    Result Date: 2/17/2024  Interpreted By:  Baltazar Chapa, STUDY: CT HEAD WO IV CONTRAST; CT CERVICAL SPINE WO IV CONTRAST;  2/17/2024 9:32 pm   INDICATION: Signs/Symptoms:fall. Altered mental status   COMPARISON: No prior cross-sectional imaging is available for comparison.   ACCESSION NUMBER(S): [id number]; [id number]   ORDERING CLINICIAN: OLIVER RICHARDS   TECHNIQUE: Axial noncontrast CT images of the head and cervical spine.   FINDINGS: BRAIN PARENCHYMA: Gray-white matter interfaces are preserved. No mass, mass effect or midline  shift. There are mild periventricular white matter hypodensities suggestive of mild chronic microvascular ischemic change.   HEMORRHAGE: No acute intracranial hemorrhage. VENTRICLES and EXTRA-AXIAL SPACES: Normal size for age. EXTRACRANIAL SOFT TISSUES: Unremarkable. CALVARIUM: No depressed calvarial fracture.   SOFT TISSUES: Within normal limits. PARANASAL SINUSES: No hemorrhage in the paranasal sinuses. There is moderate polypoid mucosal thickening of the left maxillary sinus. MASTOIDS: Within normal limits. ORBITS: Grossly normal.   ALIGNMENT: Normal cervical lordosis. VERTEBRAE: No acute fracture. Cervical vertebral body heights are maintained. There are mild age-indeterminate compression deformities of the superior endplates of T1 and T2 vertebral bodies with associated superior endplate Schmorl nodes. There are no suspicious osseous lesions. Mild osteopenia. Mild degenerative disc space narrowing and endplate reactive change at C4-C5. SPINAL CANAL: No significant spinal canal stenosis. PREVERTEBRAL SOFT TISSUES: No prevertebral soft tissue swelling. LUNG APICES: Imaged portion of the lung apices are within normal limits.   OTHER FINDINGS: None.         No acute intracranial abnormality.   No evidence of cervical spine fracture or traumatic malalignment. Mild age-indeterminate compression deformities of the superior endplates of T1 and T2. Correlation with point tenderness recommended.   Signed by: Baltazar Chapa 2/17/2024 9:39 PM Dictation workstation:   [id number]    CT cervical spine wo IV contrast    Result Date: 2/17/2024  Interpreted By:  Baltazar Chapa, STUDY: CT HEAD WO IV CONTRAST; CT CERVICAL SPINE WO IV CONTRAST;  2/17/2024 9:32 pm   INDICATION: Signs/Symptoms:fall. Altered mental status   COMPARISON: No prior cross-sectional imaging is available for comparison.   ACCESSION NUMBER(S): [id number]; [id number]   ORDERING CLINICIAN: OLIVER RICHARDS   TECHNIQUE: Axial noncontrast CT images of the  head and cervical spine.   FINDINGS: BRAIN PARENCHYMA: Gray-white matter interfaces are preserved. No mass, mass effect or midline shift. There are mild periventricular white matter hypodensities suggestive of mild chronic microvascular ischemic change.   HEMORRHAGE: No acute intracranial hemorrhage. VENTRICLES and EXTRA-AXIAL SPACES: Normal size for age. EXTRACRANIAL SOFT TISSUES: Unremarkable. CALVARIUM: No depressed calvarial fracture.   SOFT TISSUES: Within normal limits. PARANASAL SINUSES: No hemorrhage in the paranasal sinuses. There is moderate polypoid mucosal thickening of the left maxillary sinus. MASTOIDS: Within normal limits. ORBITS: Grossly normal.   ALIGNMENT: Normal cervical lordosis. VERTEBRAE: No acute fracture. Cervical vertebral body heights are maintained. There are mild age-indeterminate compression deformities of the superior endplates of T1 and T2 vertebral bodies with associated superior endplate Schmorl nodes. There are no suspicious osseous lesions. Mild osteopenia. Mild degenerative disc space narrowing and endplate reactive change at C4-C5. SPINAL CANAL: No significant spinal canal stenosis. PREVERTEBRAL SOFT TISSUES: No prevertebral soft tissue swelling. LUNG APICES: Imaged portion of the lung apices are within normal limits.   OTHER FINDINGS: None.         No acute intracranial abnormality.   No evidence of cervical spine fracture or traumatic malalignment. Mild age-indeterminate compression deformities of the superior endplates of T1 and T2. Correlation with point tenderness recommended.   Signed by: Baltazar Chapa 2/17/2024 9:39 PM Dictation workstation:   [id number]    XR knee right 1-2 views    Result Date: 2/17/2024  Interpreted By:  Orion Lucas, STUDY: XR KNEE RIGHT 1-2 VIEWS;  2/17/2024 9:24 pm   INDICATION: Signs/Symptoms:fall.   COMPARISON: None   ACCESSION NUMBER(S): [id number]   ORDERING CLINICIAN: OLIVER RICHARDS   FINDINGS: Two views right knee   No acute fracture  or dislocation. Moderate-to-severe tricompartmental degenerative changes most pronounced in the medial and patellofemoral compartments with joint space narrowing and marginal osteophytosis. Small joint effusion. Severe vascular calcifications.       1. No acute osseous abnormality identified.       Signed by: Orion Lucas 2/17/2024 9:31 PM Dictation workstation:   [id number]    XR hip right with pelvis when performed 2 or 3 views    Result Date: 2/17/2024  Interpreted By:  Orion Lucas, STUDY: XR HIP RIGHT WITH PELVIS WHEN PERFORMED 2 OR 3 VIEWS;  2/17/2024 9:24 pm   INDICATION: Signs/Symptoms:pain.   COMPARISON: None.   ACCESSION NUMBER(S): [id number]   ORDERING CLINICIAN: OLIVER RICHARDS   FINDINGS: AP view of the pelvis with AP and lateral views of the right hip   Basicervical/intertrochanteric fracture of the right hip with an additional fracture Prolene extending through the base of the lesser trochanter. Mild angulation of the intertrochanteric fracture site and mild displacement of the lesser trochanter. No dislocation. Heavy vascular calcifications in the pelvis and upper thighs.         1. Comminuted right basicervical/intertrochanteric femoral fracture.       Signed by: Orion Lucas 2/17/2024 9:30 PM Dictation workstation:   [id number]                       Opal Coma Scale  Best Eye Response: Spontaneous  Best Verbal Response: Confused  Best Motor Response: Follows commands  [place] Coma Scale Score: 14              Assessment/Plan      Principal Problem:    Fall, initial encounter  Active Problems:    Fracture of right hip, closed, initial encounter (CMS/[facility])    IMPRESSION:  83 year old female with history of DM, HTN, and DLD presented after having mechanical fall on 2/17 that resulted in R femoral fracture. She had ORIF done 2/18 with reported confusion/hallucinations in the post op period requiring dose of olanzepine IM. At 0115 on 2/19/24, ? Seizure activity followed by full arrest  (seizure per nursing documentation, not in physician code blue documentation).   HCT without acute findings, repeat HCT unchanged.   EEG prelim from fellow read as normal.    Ddx for postoperative seizure includes: Stroke (? Fat embolism vs other), anesthesia complication, medication side effect (? Olanzapine), versus other  It should be noted that her lab work on 2/18/2024 either had normal or decent numbers    RECOMMENDATIONS:  EEG final report pending still  No strong indication for AED at this time, ? Provoked seizure  Continue seizure/delirium precautions  Continue supportive care.   MRI brain wo contrast ordered.   Continue PT/OT as able    Discussed with patient and daughter.   All questions answered.  Will follow for EEG and MRI results.          I spent 35 minutes in the professional and overall care of this patient including examination, patient/family discussion, result and image review, and treatment planning.     Mariza Rojo, APRN-CNP

## (undated) DEVICE — Device

## (undated) DEVICE — BANDAGE, ELASTIC, PREMIUM, SELF-CLOSE, 4 IN X 5.5 YD, STERILE

## (undated) DEVICE — GUIDEWIRE, 3.2 X 400

## (undated) DEVICE — DRILL BIT, STEPPED, 6MM/9MM CANNULATED

## (undated) DEVICE — SUTURE, VICRYL, 0, 36 IN, CT-1, UNDYED

## (undated) DEVICE — SOLUTION, IRRIGATION, SODIUM CHLORIDE 0.9%, 1000 ML, POUR BOTTLE

## (undated) DEVICE — SYRINGE, 10 CC, LUER LOCK

## (undated) DEVICE — TAPE, SILK, DURAPORE, 3 IN X 10 YD, LF

## (undated) DEVICE — TOWEL PACK, STERILE, 4/PACK, BLUE

## (undated) DEVICE — STRIP, SKIN CLOSURE, STERI STRIP, REINFORCED, 0.5 X 4 IN

## (undated) DEVICE — SUTURE, MONOCRYL, 4-0, 18 IN, PS2, UNDYED

## (undated) DEVICE — DRILL BIT, 4.2MM 3-FLUTED QC 330MM 100MM CALB

## (undated) DEVICE — DRAPE, C-ARM IMAGE

## (undated) DEVICE — SUTURE, VICRYL, 2-0, 36 IN, CT-1, UNDYED

## (undated) DEVICE — APPLICATOR, CHLORAPREP, W/ORANGE TINT, 26ML

## (undated) DEVICE — COVER HANDLE LIGHT, STERIS, BLUE, STERILE

## (undated) DEVICE — GLOVE, PROTEXIS PI CLASSIC, SZ-7.5, PF, LF

## (undated) DEVICE — GLOVE, SURGICAL, PI ORTHO PRO, BIOGEL, SZ 8.0